# Patient Record
Sex: MALE | Race: BLACK OR AFRICAN AMERICAN | Employment: UNEMPLOYED | URBAN - METROPOLITAN AREA
[De-identification: names, ages, dates, MRNs, and addresses within clinical notes are randomized per-mention and may not be internally consistent; named-entity substitution may affect disease eponyms.]

---

## 2018-11-30 ENCOUNTER — OFFICE VISIT (OUTPATIENT)
Dept: PAIN MANAGEMENT | Age: 53
End: 2018-11-30
Payer: COMMERCIAL

## 2018-11-30 VITALS — WEIGHT: 219 LBS | DIASTOLIC BLOOD PRESSURE: 94 MMHG | SYSTOLIC BLOOD PRESSURE: 154 MMHG | HEART RATE: 78 BPM

## 2018-11-30 DIAGNOSIS — S92.401B OPEN FRACTURE OF PHALANX OF RIGHT GREAT TOE, PHYSEAL INVOLVEMENT UNSPECIFIED, UNSPECIFIED PHALANX, INITIAL ENCOUNTER: ICD-10-CM

## 2018-11-30 DIAGNOSIS — S97.81XA CRUSHING INJURY OF RIGHT FOOT, INITIAL ENCOUNTER: ICD-10-CM

## 2018-11-30 DIAGNOSIS — S90.31XA CONTUSION OF RIGHT FOOT, INITIAL ENCOUNTER: ICD-10-CM

## 2018-11-30 DIAGNOSIS — S92.501B OPEN FRACTURE OF PHALANX OF LESSER TOE OF RIGHT FOOT, PHYSEAL INVOLVEMENT UNSPECIFIED, UNSPECIFIED PHALANX, INITIAL ENCOUNTER: ICD-10-CM

## 2018-11-30 DIAGNOSIS — S93.601A SPRAIN OF RIGHT FOOT, INITIAL ENCOUNTER: ICD-10-CM

## 2018-11-30 PROBLEM — S92.911B: Status: ACTIVE | Noted: 2018-11-30

## 2018-11-30 LAB
A/G RATIO: 1.3 (ref 1.1–2.2)
ALBUMIN SERPL-MCNC: 4.4 G/DL (ref 3.4–5)
ALP BLD-CCNC: 73 U/L (ref 40–129)
ALT SERPL-CCNC: 24 U/L (ref 10–40)
ANION GAP SERPL CALCULATED.3IONS-SCNC: 13 MMOL/L (ref 3–16)
AST SERPL-CCNC: 21 U/L (ref 15–37)
BILIRUB SERPL-MCNC: 0.4 MG/DL (ref 0–1)
BUN BLDV-MCNC: 15 MG/DL (ref 7–20)
CALCIUM SERPL-MCNC: 9.5 MG/DL (ref 8.3–10.6)
CHLORIDE BLD-SCNC: 105 MMOL/L (ref 99–110)
CO2: 28 MMOL/L (ref 21–32)
CREAT SERPL-MCNC: 1 MG/DL (ref 0.9–1.3)
GFR AFRICAN AMERICAN: >60
GFR NON-AFRICAN AMERICAN: >60
GLOBULIN: 3.3 G/DL
GLUCOSE BLD-MCNC: 94 MG/DL (ref 70–99)
HCT VFR BLD CALC: 51.2 % (ref 40.5–52.5)
HEMOGLOBIN: 17 G/DL (ref 13.5–17.5)
MCH RBC QN AUTO: 31.1 PG (ref 26–34)
MCHC RBC AUTO-ENTMCNC: 33.1 G/DL (ref 31–36)
MCV RBC AUTO: 93.9 FL (ref 80–100)
PDW BLD-RTO: 13.7 % (ref 12.4–15.4)
PLATELET # BLD: 222 K/UL (ref 135–450)
PMV BLD AUTO: 8.6 FL (ref 5–10.5)
POTASSIUM SERPL-SCNC: 4.2 MMOL/L (ref 3.5–5.1)
RBC # BLD: 5.45 M/UL (ref 4.2–5.9)
SODIUM BLD-SCNC: 146 MMOL/L (ref 136–145)
TOTAL PROTEIN: 7.7 G/DL (ref 6.4–8.2)
TSH SERPL DL<=0.05 MIU/L-ACNC: 1.03 UIU/ML (ref 0.27–4.2)
WBC # BLD: 8.7 K/UL (ref 4–11)

## 2018-11-30 PROCEDURE — 99244 OFF/OP CNSLTJ NEW/EST MOD 40: CPT | Performed by: INTERNAL MEDICINE

## 2018-11-30 RX ORDER — NORTRIPTYLINE HYDROCHLORIDE 25 MG/1
25-50 CAPSULE ORAL NIGHTLY
Qty: 60 CAPSULE | Refills: 3 | Status: SHIPPED | OUTPATIENT
Start: 2018-11-30 | End: 2018-12-21 | Stop reason: ALTCHOICE

## 2018-11-30 RX ORDER — PREGABALIN 75 MG/1
CAPSULE ORAL
Qty: 60 CAPSULE | Refills: 0 | Status: SHIPPED | OUTPATIENT
Start: 2018-11-30 | End: 2018-12-21 | Stop reason: SDUPTHER

## 2018-11-30 RX ORDER — DULOXETIN HYDROCHLORIDE 30 MG/1
30 CAPSULE, DELAYED RELEASE ORAL DAILY
Qty: 30 CAPSULE | Refills: 1 | Status: SHIPPED | OUTPATIENT
Start: 2018-11-30 | End: 2018-12-21 | Stop reason: ALTCHOICE

## 2018-12-21 ENCOUNTER — OFFICE VISIT (OUTPATIENT)
Dept: PAIN MANAGEMENT | Age: 53
End: 2018-12-21
Payer: COMMERCIAL

## 2018-12-21 VITALS — HEART RATE: 89 BPM | SYSTOLIC BLOOD PRESSURE: 157 MMHG | DIASTOLIC BLOOD PRESSURE: 102 MMHG | WEIGHT: 220 LBS

## 2018-12-21 DIAGNOSIS — S97.81XA CRUSHING INJURY OF RIGHT FOOT, INITIAL ENCOUNTER: ICD-10-CM

## 2018-12-21 DIAGNOSIS — S92.401B OPEN FRACTURE OF PHALANX OF RIGHT GREAT TOE, PHYSEAL INVOLVEMENT UNSPECIFIED, UNSPECIFIED PHALANX, INITIAL ENCOUNTER: ICD-10-CM

## 2018-12-21 DIAGNOSIS — S90.31XA CONTUSION OF RIGHT FOOT, INITIAL ENCOUNTER: ICD-10-CM

## 2018-12-21 DIAGNOSIS — S92.501B OPEN FRACTURE OF PHALANX OF LESSER TOE OF RIGHT FOOT, PHYSEAL INVOLVEMENT UNSPECIFIED, UNSPECIFIED PHALANX, INITIAL ENCOUNTER: ICD-10-CM

## 2018-12-21 DIAGNOSIS — S93.601A SPRAIN OF RIGHT FOOT, INITIAL ENCOUNTER: ICD-10-CM

## 2018-12-21 PROCEDURE — 99214 OFFICE O/P EST MOD 30 MIN: CPT | Performed by: INTERNAL MEDICINE

## 2018-12-21 RX ORDER — AMITRIPTYLINE HYDROCHLORIDE 25 MG/1
25-50 TABLET, FILM COATED ORAL NIGHTLY
Qty: 60 TABLET | Refills: 0 | Status: SHIPPED | OUTPATIENT
Start: 2018-12-21 | End: 2019-01-18

## 2018-12-21 RX ORDER — PREGABALIN 75 MG/1
75 CAPSULE ORAL 3 TIMES DAILY
Qty: 90 CAPSULE | Refills: 0 | Status: SHIPPED | OUTPATIENT
Start: 2018-12-21 | End: 2019-01-18 | Stop reason: SDUPTHER

## 2018-12-21 RX ORDER — DULOXETIN HYDROCHLORIDE 60 MG/1
60 CAPSULE, DELAYED RELEASE ORAL DAILY
Qty: 30 CAPSULE | Refills: 0 | Status: SHIPPED | OUTPATIENT
Start: 2018-12-21 | End: 2019-01-18

## 2018-12-21 NOTE — PROGRESS NOTES
visual disturbance. Respiratory: Negative for shortness of breath. Cardiovascular: Negative for chest pain, palpitations  Gastrointestinal: Negative for blood in stool, abdominal distention, nausea, vomiting, abdominal pain, diarrhea,constipation. Skin: Negative for color change or any abnormal bruising. Neurological: Negative for speech difficulty, weakness and light-headedness, dizziness, tremors, sleepiness  Psychiatric/Behavioral: Negative for suicidal ideas, hallucinations, behavioral problems, self-injury, decreased concentration/cognition, agitation, confusion. PHYSICAL EXAM:   Nursing note and vitals reviewed. BP (!) 157/102   Pulse 89   Wt 220 lb (99.8 kg)   General Appearance: Patient is well nourished, well developed, well groomed and in no acute distress. Skin: Skin is warm and dry, good turgor . No rash or lesions noted. He is not diaphoretic. Pulmonary/Chest: Effort normal. No respiratory distress or use of accessory muscles. Auscultation revealing normal air entry. He does not have wheezes in the lung fields. He has no rales. Cardiovascular: Normal rate, regular rhythm, normal heart sounds, and does not have murmur. Exam reveals no gallop and no friction rub. Abdominal: Soft. Bowel sounds are normal.  On inspection of abdomen is flat no distension and no mass. No tenderness. He has no rebound and no guarding. Musculoskeletal / Extremities: Range of motion is normal. Gait is normal, assistive devices use: none. He exhibits edema: none, and no tenderness. Neurological/Psychiatric:He is alert and oriented to person, place, and time. Coordination is  normal.   Judgement and Insight is normal  His mood is Appropriate and affect is Flat/blunted and Anxious . His behavior is normal.   thought content normal.        IMPRESSION:     1. Open fracture of phalanx of right great toe, physeal involvement unspecified, unspecified phalanx, initial encounter    2.  Open fracture of

## 2018-12-31 NOTE — PROGRESS NOTES
Musculoskeletal:  No clubbing, no edema, no tenderness, no deformities. There are no varicosities  Lymphatic:  No lymphadenopathy noted   Neurologic:  Alert & oriented x 3, CN 2-12 normal, normal motor function, normal sensory function, no focal deficits noted   Psychiatric:  Speech and behavior appropriate, judgement and thought content normal     Patient's old records are reviewed in detail records from primary care physician reviewed records from orthopedics was also reviewed imaging studies reviewed old x-rays from 2013 reviewed showing fractures of the first second and third phalanges MRI of the foot was also reviewed. IMPRESSION    [facility] FRACTURE FAILINGS FOOT RIGHT SIDE BIG-TIME  [facility] FRACTURE FAILINGS FOOT RIGHT SECOND TOE  [facility] FRACTURE FAILINGS FOOT RIGHT SIDE THIRD TOE  [facility] SPURRING OF THE FOOT RIGHT  [facility] CRUSHING INJURY FOOT RIGHT SIDE  [facility] CONTUSION OF THE FOOT RIGHT SIDE    Chronic opiate treatment protocol was discussed with the patient. Informed verbal consent was obtained. Treatment guidelines were established. Risks and benefits of the medications including narcotics were discussed with the patient. SOAPP questionnaire and opioid risk tool were assessed. Long-term and short-term goals of pain management were also addressed including pain relief about 30% from baseline, improving mood, sleep, psychosocial, and physical functioning were addressed. We'll start nortriptyline 25 mg at bedtime 1-2 pills to help her neuropathic pain and also insomnia start Cymbalta 30 mg 1 a day and Lyrica 75 titrate up to 225 mg we'll try topical compounding cream to the foot 3 times a day order CBC CMP UA TSH and follow follow-up on the results. Patient profile OARRS website was reviewed. We'll do urine drug screen with GCMS opiates and follow-up on the results. All treatment options were discussed with the patient. Goals of current treatment regimen include improvement in pain, restoration of functioning- with

## 2019-01-18 ENCOUNTER — OFFICE VISIT (OUTPATIENT)
Dept: PAIN MANAGEMENT | Age: 54
End: 2019-01-18
Payer: COMMERCIAL

## 2019-01-18 VITALS — DIASTOLIC BLOOD PRESSURE: 92 MMHG | WEIGHT: 227 LBS | SYSTOLIC BLOOD PRESSURE: 158 MMHG | HEART RATE: 72 BPM

## 2019-01-18 DIAGNOSIS — S92.401B OPEN FRACTURE OF PHALANX OF RIGHT GREAT TOE, PHYSEAL INVOLVEMENT UNSPECIFIED, UNSPECIFIED PHALANX, INITIAL ENCOUNTER: ICD-10-CM

## 2019-01-18 DIAGNOSIS — S97.81XA CRUSHING INJURY OF RIGHT FOOT, INITIAL ENCOUNTER: ICD-10-CM

## 2019-01-18 DIAGNOSIS — S90.31XA CONTUSION OF RIGHT FOOT, INITIAL ENCOUNTER: ICD-10-CM

## 2019-01-18 DIAGNOSIS — S93.601A SPRAIN OF RIGHT FOOT, INITIAL ENCOUNTER: ICD-10-CM

## 2019-01-18 DIAGNOSIS — S92.501B OPEN FRACTURE OF PHALANX OF LESSER TOE OF RIGHT FOOT, PHYSEAL INVOLVEMENT UNSPECIFIED, UNSPECIFIED PHALANX, INITIAL ENCOUNTER: ICD-10-CM

## 2019-01-18 PROCEDURE — 99214 OFFICE O/P EST MOD 30 MIN: CPT | Performed by: INTERNAL MEDICINE

## 2019-01-18 RX ORDER — PREGABALIN 75 MG/1
75 CAPSULE ORAL 3 TIMES DAILY
Qty: 90 CAPSULE | Refills: 0 | Status: SHIPPED | OUTPATIENT
Start: 2019-01-18 | End: 2019-01-18 | Stop reason: CLARIF

## 2019-01-18 RX ORDER — PREGABALIN 75 MG/1
CAPSULE ORAL
Qty: 90 CAPSULE | Refills: 0 | Status: SHIPPED | OUTPATIENT
Start: 2019-01-18 | End: 2019-02-15 | Stop reason: ALTCHOICE

## 2019-02-15 ENCOUNTER — OFFICE VISIT (OUTPATIENT)
Dept: PAIN MANAGEMENT | Age: 54
End: 2019-02-15
Payer: COMMERCIAL

## 2019-02-15 VITALS — WEIGHT: 227 LBS | SYSTOLIC BLOOD PRESSURE: 147 MMHG | HEART RATE: 62 BPM | DIASTOLIC BLOOD PRESSURE: 85 MMHG

## 2019-02-15 DIAGNOSIS — S93.601A SPRAIN OF RIGHT FOOT, INITIAL ENCOUNTER: ICD-10-CM

## 2019-02-15 DIAGNOSIS — S97.81XA CRUSHING INJURY OF RIGHT FOOT, INITIAL ENCOUNTER: ICD-10-CM

## 2019-02-15 DIAGNOSIS — S92.401B OPEN FRACTURE OF PHALANX OF RIGHT GREAT TOE, PHYSEAL INVOLVEMENT UNSPECIFIED, UNSPECIFIED PHALANX, INITIAL ENCOUNTER: ICD-10-CM

## 2019-02-15 DIAGNOSIS — S92.501B OPEN FRACTURE OF PHALANX OF LESSER TOE OF RIGHT FOOT, PHYSEAL INVOLVEMENT UNSPECIFIED, UNSPECIFIED PHALANX, INITIAL ENCOUNTER: ICD-10-CM

## 2019-02-15 DIAGNOSIS — S90.31XA CONTUSION OF RIGHT FOOT, INITIAL ENCOUNTER: ICD-10-CM

## 2019-02-15 PROCEDURE — 99214 OFFICE O/P EST MOD 30 MIN: CPT | Performed by: INTERNAL MEDICINE

## 2019-02-15 RX ORDER — NORTRIPTYLINE HYDROCHLORIDE 25 MG/1
25-50 CAPSULE ORAL NIGHTLY
Qty: 60 CAPSULE | Refills: 0 | Status: SHIPPED | OUTPATIENT
Start: 2019-02-15 | End: 2019-03-15

## 2019-02-15 RX ORDER — MELOXICAM 15 MG/1
15 TABLET ORAL DAILY
Qty: 30 TABLET | Refills: 0 | Status: SHIPPED | OUTPATIENT
Start: 2019-02-15 | End: 2019-03-15 | Stop reason: SDUPTHER

## 2019-02-15 RX ORDER — PREGABALIN 100 MG/1
CAPSULE ORAL
Qty: 90 CAPSULE | Refills: 0 | Status: SHIPPED | OUTPATIENT
Start: 2019-02-15 | End: 2019-03-15 | Stop reason: SDUPTHER

## 2019-02-19 ENCOUNTER — TELEPHONE (OUTPATIENT)
Dept: PAIN MANAGEMENT | Age: 54
End: 2019-02-19

## 2019-03-15 ENCOUNTER — TELEPHONE (OUTPATIENT)
Dept: ORTHOPEDIC SURGERY | Age: 54
End: 2019-03-15

## 2019-03-15 ENCOUNTER — OFFICE VISIT (OUTPATIENT)
Dept: PAIN MANAGEMENT | Age: 54
End: 2019-03-15
Payer: COMMERCIAL

## 2019-03-15 VITALS — WEIGHT: 223 LBS | HEART RATE: 55 BPM | SYSTOLIC BLOOD PRESSURE: 162 MMHG | DIASTOLIC BLOOD PRESSURE: 88 MMHG

## 2019-03-15 DIAGNOSIS — S97.81XA CRUSHING INJURY OF RIGHT FOOT, INITIAL ENCOUNTER: ICD-10-CM

## 2019-03-15 DIAGNOSIS — S90.31XA CONTUSION OF RIGHT FOOT, INITIAL ENCOUNTER: ICD-10-CM

## 2019-03-15 DIAGNOSIS — S92.401B OPEN FRACTURE OF PHALANX OF RIGHT GREAT TOE, PHYSEAL INVOLVEMENT UNSPECIFIED, UNSPECIFIED PHALANX, INITIAL ENCOUNTER: ICD-10-CM

## 2019-03-15 DIAGNOSIS — S92.501B OPEN FRACTURE OF PHALANX OF LESSER TOE OF RIGHT FOOT, PHYSEAL INVOLVEMENT UNSPECIFIED, UNSPECIFIED PHALANX, INITIAL ENCOUNTER: ICD-10-CM

## 2019-03-15 DIAGNOSIS — S93.601A SPRAIN OF RIGHT FOOT, INITIAL ENCOUNTER: ICD-10-CM

## 2019-03-15 PROCEDURE — 99214 OFFICE O/P EST MOD 30 MIN: CPT | Performed by: INTERNAL MEDICINE

## 2019-03-15 RX ORDER — AMITRIPTYLINE HYDROCHLORIDE 25 MG/1
25-50 TABLET, FILM COATED ORAL NIGHTLY
Qty: 60 TABLET | Refills: 0 | Status: SHIPPED | OUTPATIENT
Start: 2019-03-15 | End: 2019-04-12 | Stop reason: ALTCHOICE

## 2019-03-15 RX ORDER — HYDROCODONE BITARTRATE AND ACETAMINOPHEN 5; 325 MG/1; MG/1
1 TABLET ORAL EVERY 6 HOURS PRN
Qty: 56 TABLET | Refills: 0 | Status: SHIPPED | OUTPATIENT
Start: 2019-03-15 | End: 2019-04-12 | Stop reason: ALTCHOICE

## 2019-03-15 RX ORDER — PREGABALIN 100 MG/1
CAPSULE ORAL
Qty: 90 CAPSULE | Refills: 0 | Status: SHIPPED | OUTPATIENT
Start: 2019-03-15 | End: 2019-04-12 | Stop reason: SDUPTHER

## 2019-03-15 RX ORDER — MELOXICAM 15 MG/1
15 TABLET ORAL DAILY
Qty: 30 TABLET | Refills: 0 | Status: SHIPPED | OUTPATIENT
Start: 2019-03-15 | End: 2019-04-12 | Stop reason: SDUPTHER

## 2019-04-11 DIAGNOSIS — S97.81XA CRUSHING INJURY OF RIGHT FOOT, INITIAL ENCOUNTER: ICD-10-CM

## 2019-04-11 DIAGNOSIS — S90.31XA CONTUSION OF RIGHT FOOT, INITIAL ENCOUNTER: ICD-10-CM

## 2019-04-11 DIAGNOSIS — S93.601A SPRAIN OF RIGHT FOOT, INITIAL ENCOUNTER: ICD-10-CM

## 2019-04-11 DIAGNOSIS — S92.401B OPEN FRACTURE OF PHALANX OF RIGHT GREAT TOE, PHYSEAL INVOLVEMENT UNSPECIFIED, UNSPECIFIED PHALANX, INITIAL ENCOUNTER: ICD-10-CM

## 2019-04-11 DIAGNOSIS — S92.501B OPEN FRACTURE OF PHALANX OF LESSER TOE OF RIGHT FOOT, PHYSEAL INVOLVEMENT UNSPECIFIED, UNSPECIFIED PHALANX, INITIAL ENCOUNTER: ICD-10-CM

## 2019-04-11 RX ORDER — AMITRIPTYLINE HYDROCHLORIDE 25 MG/1
25-50 TABLET, FILM COATED ORAL NIGHTLY
Qty: 60 TABLET | Refills: 0 | OUTPATIENT
Start: 2019-04-11

## 2019-04-11 RX ORDER — MELOXICAM 15 MG/1
TABLET ORAL
Qty: 30 TABLET | Refills: 0 | OUTPATIENT
Start: 2019-04-11

## 2019-04-12 ENCOUNTER — OFFICE VISIT (OUTPATIENT)
Dept: PAIN MANAGEMENT | Age: 54
End: 2019-04-12
Payer: COMMERCIAL

## 2019-04-12 ENCOUNTER — OFFICE VISIT (OUTPATIENT)
Dept: ORTHOPEDIC SURGERY | Age: 54
End: 2019-04-12
Payer: COMMERCIAL

## 2019-04-12 VITALS
BODY MASS INDEX: 32.93 KG/M2 | WEIGHT: 223 LBS | HEART RATE: 64 BPM | DIASTOLIC BLOOD PRESSURE: 93 MMHG | SYSTOLIC BLOOD PRESSURE: 148 MMHG

## 2019-04-12 VITALS
RESPIRATION RATE: 16 BRPM | DIASTOLIC BLOOD PRESSURE: 87 MMHG | BODY MASS INDEX: 33.03 KG/M2 | WEIGHT: 223 LBS | HEART RATE: 51 BPM | HEIGHT: 69 IN | SYSTOLIC BLOOD PRESSURE: 170 MMHG

## 2019-04-12 DIAGNOSIS — S93.601A SPRAIN OF RIGHT FOOT, INITIAL ENCOUNTER: ICD-10-CM

## 2019-04-12 DIAGNOSIS — S92.401B OPEN FRACTURE OF PHALANX OF RIGHT GREAT TOE, PHYSEAL INVOLVEMENT UNSPECIFIED, UNSPECIFIED PHALANX, INITIAL ENCOUNTER: ICD-10-CM

## 2019-04-12 DIAGNOSIS — S92.501B OPEN FRACTURE OF PHALANX OF LESSER TOE OF RIGHT FOOT, PHYSEAL INVOLVEMENT UNSPECIFIED, UNSPECIFIED PHALANX, INITIAL ENCOUNTER: ICD-10-CM

## 2019-04-12 DIAGNOSIS — S90.31XS CONTUSION OF RIGHT FOOT, SEQUELA: Primary | ICD-10-CM

## 2019-04-12 DIAGNOSIS — M79.671 FOOT PAIN, RIGHT: ICD-10-CM

## 2019-04-12 DIAGNOSIS — S90.31XA CONTUSION OF RIGHT FOOT, INITIAL ENCOUNTER: ICD-10-CM

## 2019-04-12 DIAGNOSIS — S97.81XA CRUSHING INJURY OF RIGHT FOOT, INITIAL ENCOUNTER: ICD-10-CM

## 2019-04-12 PROCEDURE — 99203 OFFICE O/P NEW LOW 30 MIN: CPT | Performed by: ORTHOPAEDIC SURGERY

## 2019-04-12 PROCEDURE — 99214 OFFICE O/P EST MOD 30 MIN: CPT | Performed by: INTERNAL MEDICINE

## 2019-04-12 RX ORDER — NORTRIPTYLINE HYDROCHLORIDE 25 MG/1
25-50 CAPSULE ORAL NIGHTLY
Qty: 60 CAPSULE | Refills: 0 | Status: SHIPPED | OUTPATIENT
Start: 2019-04-12 | End: 2019-05-10 | Stop reason: SDUPTHER

## 2019-04-12 RX ORDER — PREGABALIN 100 MG/1
CAPSULE ORAL
Qty: 90 CAPSULE | Refills: 0 | Status: SHIPPED | OUTPATIENT
Start: 2019-04-12 | End: 2019-05-10 | Stop reason: SDUPTHER

## 2019-04-12 RX ORDER — MELOXICAM 15 MG/1
15 TABLET ORAL DAILY
Qty: 30 TABLET | Refills: 0 | Status: SHIPPED | OUTPATIENT
Start: 2019-04-12 | End: 2019-05-10 | Stop reason: SDUPTHER

## 2019-04-12 RX ORDER — OXYCODONE HYDROCHLORIDE AND ACETAMINOPHEN 5; 325 MG/1; MG/1
1 TABLET ORAL DAILY
Qty: 28 TABLET | Refills: 0 | Status: SHIPPED | OUTPATIENT
Start: 2019-04-12 | End: 2019-05-10 | Stop reason: SDUPTHER

## 2019-04-12 NOTE — PROGRESS NOTES
restorative. Does not feel rested in AM. Complains of feeling sleepy  during the day, he is off the Elavil. Patient denies any constipation symptoms. Patient reports his weight has been stable. ALLERGIES/PAST MED/FAM/SOC HISTORY: Mr. Aston Stark allergies, past medical, family and social history were reviewed in the chart and also listed below.   Social History     Socioeconomic History    Marital status: Unknown     Spouse name: Not on file    Number of children: Not on file    Years of education: Not on file    Highest education level: Not on file   Occupational History    Not on file   Social Needs    Financial resource strain: Not on file    Food insecurity:     Worry: Not on file     Inability: Not on file    Transportation needs:     Medical: Not on file     Non-medical: Not on file   Tobacco Use    Smoking status: Never Smoker    Smokeless tobacco: Never Used   Substance and Sexual Activity    Alcohol use: Not on file    Drug use: Not on file    Sexual activity: Not on file   Lifestyle    Physical activity:     Days per week: Not on file     Minutes per session: Not on file    Stress: Not on file   Relationships    Social connections:     Talks on phone: Not on file     Gets together: Not on file     Attends Muslim service: Not on file     Active member of club or organization: Not on file     Attends meetings of clubs or organizations: Not on file     Relationship status: Not on file    Intimate partner violence:     Fear of current or ex partner: Not on file     Emotionally abused: Not on file     Physically abused: Not on file     Forced sexual activity: Not on file   Other Topics Concern    Not on file   Social History Narrative    Not on file       Mr. Aston Stark current medications are   Outpatient Medications Prior to Visit   Medication Sig Dispense Refill    folic acid-pyridoxine-cyanocobalamine (FOLBEE) 2.5-25-1 MG TABS tablet Take 1 tablet by mouth daily 30 tablet 0    meloxicam (MOBIC) 15 MG tablet Take 1 tablet by mouth daily 30 tablet 0    pregabalin (LYRICA) 100 MG capsule Take one capsule in the morning and two capsules at night 90 capsule 0    HYDROcodone-acetaminophen (NORCO) 5-325 MG per tablet Take 1 tablet by mouth every 6 hours as needed for Pain (Max 1-2 per day) for up to 28 days. 56 tablet 0    Buprenorphine HCl (BELBUCA) 75 MCG FILM Place 1 Film inside cheek 2 times daily for 30 days. 60 each 0    amitriptyline (ELAVIL) 25 MG tablet Take 1-2 tablets by mouth nightly 60 tablet 0    Diclofenac Sodium POWD 2 g by Does not apply route 3 times daily #3 D Baclo 2%, Diclo 3%, Abeba 6%, Lido 2%, Prilo 2% Pharm to comp 240 g 5     No facility-administered medications prior to visit. REVIEW OF SYSTEMS:   Constitutional: Negative for fatigue and unexpected weight change. Eyes: Negative for visual disturbance. Respiratory: Negative for shortness of breath. Cardiovascular: Negative for chest pain, palpitations  Gastrointestinal: Negative for blood in stool, abdominal distention, nausea, vomiting, abdominal pain, diarrhea,constipation. Skin: Negative for color change or any abnormal bruising. Neurological: Negative for speech difficulty, weakness and light-headedness, dizziness, tremors, sleepiness  Psychiatric/Behavioral: Negative for suicidal ideas, hallucinations, behavioral problems, self-injury, decreased concentration/cognition, agitation, confusion. PHYSICAL EXAM:   Nursing note and vitals reviewed. BP (!) 148/93   Pulse 64   Wt 223 lb (101.2 kg)   BMI 32.93 kg/m²   General Appearance: Patient is well nourished, well developed, well groomed and in no acute distress. Skin: Skin is warm and dry, good turgor . No rash or lesions noted. He is not diaphoretic. Pulmonary/Chest: Effort normal. No respiratory distress or use of accessory muscles. Auscultation revealing normal air entry. He does not have wheezes in the lung fields. He has no rales. Cardiovascular: Normal rate, regular rhythm, normal heart sounds, and does not have murmur. Exam reveals no gallop and no friction rub. Abdominal: Soft. Bowel sounds are normal.  On inspection of abdomen is flat no distension and no mass. No tenderness. He has no rebound and no guarding. Musculoskeletal / Extremities: Range of motion is normal. Gait is normal, assistive devices use: none. He exhibits edema: none, and no tenderness. Neurological/Psychiatric:He is alert and oriented to person, place, and time. Coordination is  normal.   Judgement and Insight is normal  His mood is Appropriate and affect is Flat/blunted and Anxious . His behavior is normal.   thought content normal.        IMPRESSION:     1. Open fracture of phalanx of right great toe, physeal involvement unspecified, unspecified phalanx, initial encounter    2. Open fracture of phalanx of lesser toe of right foot, physeal involvement unspecified, unspecified phalanx, initial encounter    3. Sprain of right foot, initial encounter    4. Crushing injury of right foot, initial encounter    5.  Contusion of right foot, initial encounter        PLAN:  Informed verbal consent was obtained.  -continue with current opioid regimen  -d/c Belbuca, and Aleks Sinha says it is not helping and start Nucynta ER 50 BID along with Percocet 5 mg 1 in the afternoon  -records from Ortho not available  -He was advised weight reduction, diet changes- 800-1200 jairo diet, diet diary, exercising, nutritional  consult increased physical activity as tolerated   -He was advised to increase fluids ( 5-7  glasses of fluid daily), limit caffeine, avoid cheese products, increase dietary fiber, increase activity and exercise as tolerated and relax regularly and enjoy meals   -d/c Elavil and start Pamelor 25 mg 1-2 night for insomnia   -he was advised proper sleep hygiene-told to avoid:use of caffeine or other stimulants after noon, alcohol use near bedtime, long or frequent naps during the day, erratic sleep schedule, heavy meals near bedtime, vigorous exercise near bedtime and use of electronic devices near bedtime   -[facility] has not approved the Topical compounding cream  -will order compression stockings on the right foot    Mr. Terri Renee will be prescribed  the medications  listed below which are for treatment of his presenting  medical problems which for this visit include:   Diagnoses of Open fracture of phalanx of right great toe, physeal involvement unspecified, unspecified phalanx, initial encounter, Open fracture of phalanx of lesser toe of right foot, physeal involvement unspecified, unspecified phalanx, initial encounter, Sprain of right foot, initial encounter, Crushing injury of right foot, initial encounter, and Contusion of right foot, initial encounter were pertinent to this visit. Medications/orders associated with this visit:    Current Outpatient Medications   Medication Sig Dispense Refill    folic acid-pyridoxine-cyanocobalamine (FOLBEE) 2.5-25-1 MG TABS tablet Take 1 tablet by mouth daily 30 tablet 0    meloxicam (MOBIC) 15 MG tablet Take 1 tablet by mouth daily 30 tablet 0    pregabalin (LYRICA) 100 MG capsule Take one capsule in the morning and two capsules at night 90 capsule 0    HYDROcodone-acetaminophen (NORCO) 5-325 MG per tablet Take 1 tablet by mouth every 6 hours as needed for Pain (Max 1-2 per day) for up to 28 days. 56 tablet 0    Buprenorphine HCl (BELBUCA) 75 MCG FILM Place 1 Film inside cheek 2 times daily for 30 days. 60 each 0    amitriptyline (ELAVIL) 25 MG tablet Take 1-2 tablets by mouth nightly 60 tablet 0    Diclofenac Sodium POWD 2 g by Does not apply route 3 times daily #3 D Baclo 2%, Diclo 3%, Abeba 6%, Lido 2%, Prilo 2% Pharm to comp 240 g 5     No current facility-administered medications for this visit.          Goals of current treatment regimen include improvement in pain, restoration of functioning- with focus on improvement in physical performance, general activity, work or disability,emotional distress, health care utilization and  decreased medication consumption. Will continue to monitor progress towards achieving/maintaining therapeutic goals with special emphasis on  1. Improvement in perceived interfernce  of pain with ADL's. Ability to do home exercises independently. Ability to do household chores indoor and/or outdoor work and social and leisure activities. To increase flexibility/ROM, strength and endurance. Improve psychosocial and physical functioning.- he is not showing any significant progress/or showing regression  towards this goal and reassessment and adjustment of goals/treatment have been made. 2. Improving sleep to 6-7 hours a night. Improve mood/ anxiety and depression symptoms such as crying spells, low energy, problems with concentration, motivation.- he is not showing any significant progress/or showing regression  towards this goal and reassessment and adjustment of goals/treatment have been made. 3. Reduction of reliance on opioid analgesia/more appropriate opioid use. - he is showing progression towards this treatment goal with the current regimen. Risks and benefits of the medications and other alternative treatments have been/were  discussed with the patient. Any questions on the  common side effects of these medications were also answered. He was advised against drinking alcohol with the narcotic pain medicines, advised against driving or handling machinery when  starting or adjusting the dose of medicines, feeling groggy or drowsy, or if having any cognitive issues related to the current medications. Heis fully aware of the risk of overdose and death, if medicines are misused and not taken as prescribed. If he develops new symptoms or if the symptoms worsen, he was told to call the office. .  Thank you for allowing me to participate in the care of this patient.     Dario Silver MD.    Cc: Samson Delcid primary care provider on file. Marge Ayala, scribing for in the presence  of Dr. Ezekiel White.   04/12/19  3:16 PM  Jean Carlos Hallman Assistant    I, Dr. Ezekiel White, personally performed the services described in this documentation as scribed by  Patrica Calvin MA in my presence and it is both accurate and complete

## 2019-04-21 NOTE — PROGRESS NOTES
CHIEF COMPLAINT:   1- Right foot pain/ foot contusion  2- Right foot chronic pain/ possible RSD. DATE OF INJURY:  11/2013, Worker's Comp    HISTORY:  Mr. Ivy Dooley 52 y.o.   male  presents today for the first visit for a 2nd opinion and evaluation of a right foot injury which occurred when he was at work and a fork lift ran over his foot. He is complaining of foot pain 9-10/10 and sensitivity to touch. This is better with elevation and worse with bearing wt. The pain is sharp and radiating with numbness and tingling sensation. No other complaint. He was seen by Dr Erinn Jimenez, and now in pain management with Dr Lorna Grant. History reviewed. No pertinent past medical history. History reviewed. No pertinent surgical history.     Social History     Socioeconomic History    Marital status: Unknown     Spouse name: Not on file    Number of children: Not on file    Years of education: Not on file    Highest education level: Not on file   Occupational History    Not on file   Social Needs    Financial resource strain: Not on file    Food insecurity:     Worry: Not on file     Inability: Not on file    Transportation needs:     Medical: Not on file     Non-medical: Not on file   Tobacco Use    Smoking status: Never Smoker    Smokeless tobacco: Never Used   Substance and Sexual Activity    Alcohol use: Not on file    Drug use: Not on file    Sexual activity: Not on file   Lifestyle    Physical activity:     Days per week: Not on file     Minutes per session: Not on file    Stress: Not on file   Relationships    Social connections:     Talks on phone: Not on file     Gets together: Not on file     Attends Mosque service: Not on file     Active member of club or organization: Not on file     Attends meetings of clubs or organizations: Not on file     Relationship status: Not on file    Intimate partner violence:     Fear of current or ex partner: Not on file     Emotionally abused: Not on file     Physically abused: Not on file     Forced sexual activity: Not on file   Other Topics Concern    Not on file   Social History Narrative    Not on file       History reviewed. No pertinent family history. Current Outpatient Medications on File Prior to Visit   Medication Sig Dispense Refill    folic acid-pyridoxine-cyanocobalamine (FOLBEE) 2.5-25-1 MG TABS tablet Take 1 tablet by mouth daily 30 tablet 0    meloxicam (MOBIC) 15 MG tablet Take 1 tablet by mouth daily 30 tablet 0    pregabalin (LYRICA) 100 MG capsule Take one capsule in the morning and two capsules at night 90 capsule 0    tapentadol (NUCYNTA ER) 50 MG TB12 extended release tablet Take 1 tablet by mouth every 12 hours for 28 days. 56 tablet 0    nortriptyline (PAMELOR) 25 MG capsule Take 1-2 capsules by mouth nightly 60 capsule 0    oxyCODONE-acetaminophen (PERCOCET) 5-325 MG per tablet Take 1 tablet by mouth daily for 28 days. Take in the afternoon 28 tablet 0    Diclofenac Sodium POWD 2 g by Does not apply route 3 times daily #3 D Baclo 2%, Diclo 3%, Abeba 6%, Lido 2%, Prilo 2% Pharm to comp 240 g 5     No current facility-administered medications on file prior to visit. Pertinent items are noted in HPI  Review of systems reviewed from Patient History Form dated on 4/12/2019 and available in the patient's chart under the Media tab. No change noted. PHYSICAL EXAMINATION:  Mr. Manuel Calvert is a very pleasant 52 y.o.  male who presents today in no acute distress, awake, alert, and oriented. He is well dressed, nourished and  groomed. Patient with normal affect. Height is  5' 9\" (1.753 m), weight is 223 lb (101.2 kg), Body mass index is 32.93 kg/m². Resting respiratory rate is 16. Examination of the gait, showed that the patient walks with a limp, WB right leg . Examination of both ankles showing a decreased range of motion of the right ankle compare to the other side because of foot pain.   There is

## 2019-05-10 ENCOUNTER — OFFICE VISIT (OUTPATIENT)
Dept: PAIN MANAGEMENT | Age: 54
End: 2019-05-10
Payer: COMMERCIAL

## 2019-05-10 VITALS
DIASTOLIC BLOOD PRESSURE: 83 MMHG | SYSTOLIC BLOOD PRESSURE: 155 MMHG | WEIGHT: 223 LBS | BODY MASS INDEX: 33.03 KG/M2 | HEART RATE: 64 BPM | HEIGHT: 69 IN

## 2019-05-10 DIAGNOSIS — S97.81XA CRUSHING INJURY OF RIGHT FOOT, INITIAL ENCOUNTER: ICD-10-CM

## 2019-05-10 DIAGNOSIS — S93.601A SPRAIN OF RIGHT FOOT, INITIAL ENCOUNTER: ICD-10-CM

## 2019-05-10 DIAGNOSIS — S92.401B OPEN FRACTURE OF PHALANX OF RIGHT GREAT TOE, PHYSEAL INVOLVEMENT UNSPECIFIED, UNSPECIFIED PHALANX, INITIAL ENCOUNTER: ICD-10-CM

## 2019-05-10 DIAGNOSIS — S92.501B OPEN FRACTURE OF PHALANX OF LESSER TOE OF RIGHT FOOT, PHYSEAL INVOLVEMENT UNSPECIFIED, UNSPECIFIED PHALANX, INITIAL ENCOUNTER: ICD-10-CM

## 2019-05-10 DIAGNOSIS — S90.31XA CONTUSION OF RIGHT FOOT, INITIAL ENCOUNTER: ICD-10-CM

## 2019-05-10 PROCEDURE — 99214 OFFICE O/P EST MOD 30 MIN: CPT | Performed by: INTERNAL MEDICINE

## 2019-05-10 RX ORDER — OXYCODONE HYDROCHLORIDE AND ACETAMINOPHEN 5; 325 MG/1; MG/1
1 TABLET ORAL DAILY
Qty: 84 TABLET | Refills: 0 | Status: SHIPPED | OUTPATIENT
Start: 2019-05-10 | End: 2019-06-14 | Stop reason: SDUPTHER

## 2019-05-10 RX ORDER — PREGABALIN 100 MG/1
CAPSULE ORAL
Qty: 90 CAPSULE | Refills: 0 | Status: SHIPPED | OUTPATIENT
Start: 2019-05-10 | End: 2019-06-14 | Stop reason: SDUPTHER

## 2019-05-10 RX ORDER — NORTRIPTYLINE HYDROCHLORIDE 25 MG/1
25-50 CAPSULE ORAL NIGHTLY
Qty: 60 CAPSULE | Refills: 0 | Status: SHIPPED | OUTPATIENT
Start: 2019-05-10 | End: 2019-06-14 | Stop reason: SDUPTHER

## 2019-05-10 RX ORDER — MELOXICAM 15 MG/1
15 TABLET ORAL DAILY
Qty: 30 TABLET | Refills: 0 | Status: SHIPPED | OUTPATIENT
Start: 2019-05-10 | End: 2019-05-17 | Stop reason: SDUPTHER

## 2019-05-13 NOTE — PROGRESS NOTES
of sleep apnea. Feels somewhat rested in the morning. Patient's  subjective report of his mood is fair. he describes occasional symptoms of depression, occasional  irritability and some mood swings. Describes his mood as being neutral and reports some pleasure in his daily activities. Reports  fair  appetite, energy and concentration. Able to function well in different aspects of his daily activities. Denies suicidal or homicidal ideation. Denies any complaints of increased tension, does   Worry sometimes and occasional  irritability  he denies any c/o increased anxiety, No c/o panic attacks or symptoms of PTSD. He states his weight has been stable. Lexi Underwood is seen with his wife who wants to give most of the history. ALLERGIES/PAST MED/FAM/SOC HISTORY: Mr. Roman Pallas allergies, past medical, family and social history were reviewed in the chart and also listed below.   Social History     Socioeconomic History    Marital status: Unknown     Spouse name: Not on file    Number of children: Not on file    Years of education: Not on file    Highest education level: Not on file   Occupational History    Not on file   Social Needs    Financial resource strain: Not on file    Food insecurity:     Worry: Not on file     Inability: Not on file    Transportation needs:     Medical: Not on file     Non-medical: Not on file   Tobacco Use    Smoking status: Never Smoker    Smokeless tobacco: Never Used   Substance and Sexual Activity    Alcohol use: Not on file    Drug use: Not on file    Sexual activity: Not on file   Lifestyle    Physical activity:     Days per week: Not on file     Minutes per session: Not on file    Stress: Not on file   Relationships    Social connections:     Talks on phone: Not on file     Gets together: Not on file     Attends Restoration service: Not on file     Active member of club or organization: Not on file     Attends meetings of clubs or organizations: Not on file Relationship status: Not on file    Intimate partner violence:     Fear of current or ex partner: Not on file     Emotionally abused: Not on file     Physically abused: Not on file     Forced sexual activity: Not on file   Other Topics Concern    Not on file   Social History Narrative    Not on file       Mr. Roseann Johnson current medications are   Outpatient Medications Prior to Visit   Medication Sig Dispense Refill    folic acid-pyridoxine-cyanocobalamine (FOLBEE) 2.5-25-1 MG TABS tablet Take 1 tablet by mouth daily 30 tablet 0    meloxicam (MOBIC) 15 MG tablet Take 1 tablet by mouth daily 30 tablet 0    pregabalin (LYRICA) 100 MG capsule Take one capsule in the morning and two capsules at night 90 capsule 0    tapentadol (NUCYNTA ER) 50 MG TB12 extended release tablet Take 1 tablet by mouth every 12 hours for 28 days. 56 tablet 0    nortriptyline (PAMELOR) 25 MG capsule Take 1-2 capsules by mouth nightly 60 capsule 0    oxyCODONE-acetaminophen (PERCOCET) 5-325 MG per tablet Take 1 tablet by mouth daily for 28 days. Take in the afternoon 28 tablet 0    Diclofenac Sodium POWD 2 g by Does not apply route 3 times daily #3 D Baclo 2%, Diclo 3%, Abeba 6%, Lido 2%, Prilo 2% Pharm to comp 240 g 5     No facility-administered medications prior to visit. REVIEW OF SYSTEMS:   Constitutional: Negative for fatigue and unexpected weight change. Eyes: Negative for visual disturbance. Respiratory: Negative for shortness of breath. Cardiovascular: Negative for chest pain, palpitations  Gastrointestinal: Negative for blood in stool, abdominal distention, nausea, vomiting, abdominal pain, diarrhea,constipation. Skin: Negative for color change or any abnormal bruising.    Neurological: Negative for speech difficulty, weakness and light-headedness, dizziness, tremors, sleepiness  Psychiatric/Behavioral: Negative for suicidal ideas, hallucinations, behavioral problems, self-injury, decreased concentration/cognition, agitation, confusion. PHYSICAL EXAM:   Nursing note and vitals reviewed. BP (!) 155/83   Pulse 64   Ht 5' 9\" (1.753 m)   Wt 223 lb (101.2 kg)   BMI 32.93 kg/m²   General Appearance: Patient is well nourished, well developed, well groomed and in no acute distress. Skin: Skin is warm and dry, good turgor . No rash or lesions noted. He is not diaphoretic. Pulmonary/Chest: Effort normal. No respiratory distress or use of accessory muscles. Auscultation revealing normal air entry. He does not have wheezes in the lung fields. He has no rales. Cardiovascular: Normal rate, regular rhythm, normal heart sounds, and does not have murmur. Exam reveals no gallop and no friction rub. Abdominal: Soft. Bowel sounds are normal.  On inspection of abdomen is obese no distension and no mass. No tenderness. He has no rebound and no guarding. Musculoskeletal / Extremities: Range of motion is normal. Gait is normal, assistive devices use: none. He exhibits edema: none, and no tenderness. Neurological/Psychiatric:He is alert and oriented to person, place, and time. Coordination is  normal.   Judgement and Insight is normal  His mood is Appropriate and affect is Flat/blunted and Anxious . His behavior is normal.   thought content normal.        IMPRESSION:     1. Open fracture of phalanx of right great toe, physeal involvement unspecified, unspecified phalanx, initial encounter    2. Open fracture of phalanx of lesser toe of right foot, physeal involvement unspecified, unspecified phalanx, initial encounter    3. Sprain of right foot, initial encounter    4. Crushing injury of right foot, initial encounter    5.  Contusion of right foot, initial encounter        PLAN:  Informed verbal consent was obtained.  -Will Discontinue Nucynta ER and start Percocet 3 per day   -Continue with Lyrica 3 per day   -Continue with Mobic   -Urine drug screen with GC/MS for opiates and drugs of abuse was ordered and will follow up on results.   -Cant tolerate Cymbalta   -[facility] denies the topical compounding  -Patient states his sleep is fair. Has fairly normal sleep latency. Averages about 4-6 hours of sleep a night. Denies any signs of sleep apnea. Feels somewhat rested in the morning.    -Records from ortho reviewed   Mr. Roman Pallas will be prescribed  the medications  listed below which are for treatment of his presenting  medical problems which for this visit include:   Diagnoses of Open fracture of phalanx of right great toe, physeal involvement unspecified, unspecified phalanx, initial encounter, Open fracture of phalanx of lesser toe of right foot, physeal involvement unspecified, unspecified phalanx, initial encounter, Sprain of right foot, initial encounter, Crushing injury of right foot, initial encounter, and Contusion of right foot, initial encounter were pertinent to this visit. Medications/orders associated with this visit:    Current Outpatient Medications   Medication Sig Dispense Refill    folic acid-pyridoxine-cyanocobalamine (FOLBEE) 2.5-25-1 MG TABS tablet Take 1 tablet by mouth daily 30 tablet 0    meloxicam (MOBIC) 15 MG tablet Take 1 tablet by mouth daily 30 tablet 0    pregabalin (LYRICA) 100 MG capsule Take one capsule in the morning and two capsules at night 90 capsule 0    nortriptyline (PAMELOR) 25 MG capsule Take 1-2 capsules by mouth nightly 60 capsule 0    oxyCODONE-acetaminophen (PERCOCET) 5-325 MG per tablet Take 1 tablet by mouth daily for 28 days. Max 3/day 84 tablet 0    Diclofenac Sodium POWD 2 g by Does not apply route 3 times daily #3 D Baclo 2%, Diclo 3%, Abeba 6%, Lido 2%, Prilo 2% Pharm to comp 240 g 5     No current facility-administered medications for this visit.          Goals of current treatment regimen include improvement in pain, restoration of functioning- with focus on improvement in physical performance, general activity, work or disability,emotional distress, health care utilization and

## 2019-05-17 DIAGNOSIS — S90.31XA CONTUSION OF RIGHT FOOT, INITIAL ENCOUNTER: ICD-10-CM

## 2019-05-17 DIAGNOSIS — S93.601A SPRAIN OF RIGHT FOOT, INITIAL ENCOUNTER: ICD-10-CM

## 2019-05-17 DIAGNOSIS — S97.81XA CRUSHING INJURY OF RIGHT FOOT, INITIAL ENCOUNTER: ICD-10-CM

## 2019-05-17 DIAGNOSIS — S92.401B OPEN FRACTURE OF PHALANX OF RIGHT GREAT TOE, PHYSEAL INVOLVEMENT UNSPECIFIED, UNSPECIFIED PHALANX, INITIAL ENCOUNTER: ICD-10-CM

## 2019-05-17 DIAGNOSIS — S92.501B OPEN FRACTURE OF PHALANX OF LESSER TOE OF RIGHT FOOT, PHYSEAL INVOLVEMENT UNSPECIFIED, UNSPECIFIED PHALANX, INITIAL ENCOUNTER: ICD-10-CM

## 2019-05-17 RX ORDER — MELOXICAM 15 MG/1
15 TABLET ORAL DAILY
Qty: 30 TABLET | Refills: 1 | Status: SHIPPED | OUTPATIENT
Start: 2019-05-17 | End: 2019-06-14 | Stop reason: SDUPTHER

## 2019-05-20 RX ORDER — MELOXICAM 15 MG/1
TABLET ORAL
Qty: 30 TABLET | Refills: 0 | OUTPATIENT
Start: 2019-05-20

## 2019-06-14 ENCOUNTER — TELEPHONE (OUTPATIENT)
Dept: PAIN MANAGEMENT | Age: 54
End: 2019-06-14

## 2019-06-14 ENCOUNTER — OFFICE VISIT (OUTPATIENT)
Dept: PAIN MANAGEMENT | Age: 54
End: 2019-06-14
Payer: COMMERCIAL

## 2019-06-14 VITALS
HEART RATE: 63 BPM | WEIGHT: 220 LBS | SYSTOLIC BLOOD PRESSURE: 152 MMHG | DIASTOLIC BLOOD PRESSURE: 87 MMHG | BODY MASS INDEX: 32.49 KG/M2

## 2019-06-14 DIAGNOSIS — S92.401B OPEN FRACTURE OF PHALANX OF RIGHT GREAT TOE, PHYSEAL INVOLVEMENT UNSPECIFIED, UNSPECIFIED PHALANX, INITIAL ENCOUNTER: ICD-10-CM

## 2019-06-14 DIAGNOSIS — S97.81XA CRUSHING INJURY OF RIGHT FOOT, INITIAL ENCOUNTER: ICD-10-CM

## 2019-06-14 DIAGNOSIS — S90.31XA CONTUSION OF RIGHT FOOT, INITIAL ENCOUNTER: ICD-10-CM

## 2019-06-14 DIAGNOSIS — S93.601A SPRAIN OF RIGHT FOOT, INITIAL ENCOUNTER: ICD-10-CM

## 2019-06-14 DIAGNOSIS — S92.501B OPEN FRACTURE OF PHALANX OF LESSER TOE OF RIGHT FOOT, PHYSEAL INVOLVEMENT UNSPECIFIED, UNSPECIFIED PHALANX, INITIAL ENCOUNTER: ICD-10-CM

## 2019-06-14 PROCEDURE — 99213 OFFICE O/P EST LOW 20 MIN: CPT | Performed by: INTERNAL MEDICINE

## 2019-06-14 RX ORDER — NORTRIPTYLINE HYDROCHLORIDE 25 MG/1
25-50 CAPSULE ORAL NIGHTLY
Qty: 60 CAPSULE | Refills: 0 | Status: SHIPPED | OUTPATIENT
Start: 2019-06-14 | End: 2019-08-15

## 2019-06-14 RX ORDER — OXYCODONE HYDROCHLORIDE AND ACETAMINOPHEN 5; 325 MG/1; MG/1
1 TABLET ORAL EVERY 6 HOURS PRN
Qty: 30 TABLET | Refills: 0 | Status: SHIPPED | OUTPATIENT
Start: 2019-06-14 | End: 2019-07-19 | Stop reason: SDUPTHER

## 2019-06-14 RX ORDER — PREGABALIN 100 MG/1
CAPSULE ORAL
Qty: 90 CAPSULE | Refills: 0 | Status: SHIPPED | OUTPATIENT
Start: 2019-06-14 | End: 2019-07-19 | Stop reason: SDUPTHER

## 2019-06-14 RX ORDER — MELOXICAM 15 MG/1
15 TABLET ORAL DAILY
Qty: 30 TABLET | Refills: 1 | Status: SHIPPED | OUTPATIENT
Start: 2019-06-14 | End: 2019-07-19 | Stop reason: SDUPTHER

## 2019-06-14 NOTE — PROGRESS NOTES
Jorge Luis Brownconrad  1965  [id number]    HISTORY OF PRESENT ILLNESS:  Mr. Fiona Yañez is a 62 y.o. male returns for a follow up visit for multiple medical problems. His current presenting problems are   1. Open fracture of phalanx of right great toe, physeal involvement unspecified, unspecified phalanx, initial encounter    2. Open fracture of phalanx of lesser toe of right foot, physeal involvement unspecified, unspecified phalanx, initial encounter    3. Sprain of right foot, initial encounter    4. Crushing injury of right foot, initial encounter    5. Contusion of right foot, initial encounter    . As per information/history obtained from the PADT(patient assessment and documentation tool) - He complains of pain in the lower back and knees Right with radiation to the buttocks, hips Right, upper leg Right, lower leg Bilateral, ankles Bilateral and feet Bilateral He rates the pain 8/10 and describes it as burning, throbbing. Pain is made worse by: walking, standing, bending. Current treatment regimen has helped relieve about 20% of the pain. He denies side effects from the current pain regimen. Patient reports that since the last follow up visit the physical functioning is unchanged, family/social relationships are unchanged, mood is unchanged and sleep patterns are unchanged, and that the overall functioning is unchanged. Patient denies neurological bowel or bladder. Patient denies misusing/abusing his narcotic pain medications or using any illegal drugs. There are No indicators for possible drug abuse, addiction or diversion problems. Upon obtaining the medical history from Mr. Fiona Yañez regarding today's office visit for his presenting problems, Mr. Fiona Yañez states he is doing fair, pain has been baseline, says he still works. He mentions he is working full time still. He says he has not seen Ortho for 2nd opinion. He states he is using Lyrica. Not sure if patient is complaint with his medications. ALLERGIES: Patients list of allergies were reviewed     MEDICATIONS: Mr. Tariq Pérez list of medications were reviewed. His current medications are   Outpatient Medications Prior to Visit   Medication Sig Dispense Refill    meloxicam (MOBIC) 15 MG tablet Take 1 tablet by mouth daily 30 tablet 1    folic acid-pyridoxine-cyanocobalamine (FOLBEE) 2.5-25-1 MG TABS tablet Take 1 tablet by mouth daily 30 tablet 0    nortriptyline (PAMELOR) 25 MG capsule Take 1-2 capsules by mouth nightly 60 capsule 0    Diclofenac Sodium POWD 2 g by Does not apply route 3 times daily #3 D Baclo 2%, Diclo 3%, Abeba 6%, Lido 2%, Prilo 2% Pharm to comp 240 g 5    pregabalin (LYRICA) 100 MG capsule Take one capsule in the morning and two capsules at night 90 capsule 0     No facility-administered medications prior to visit. SOCIAL/FAMILY/PAST MEDICAL HISTORY: Mr. Allyson Flaherty, family and past medical history was reviewed. REVIEW OF SYSTEMS:    Respiratory: Negative for apnea, chest tightness and shortness of breath or change in baseline breathing. Gastrointestinal: Negative for nausea, vomiting, abdominal pain, diarrhea, constipation, blood in stool and abdominal distention. PHYSICAL EXAM:   Nursing note and vitals reviewed. BP (!) 152/87   Pulse 63   Wt 220 lb (99.8 kg)   BMI 32.49 kg/m²   Constitutional: He appears well-developed and well-nourished. No acute distress. Skin: Skin is warm and dry, good turgor. No rash noted. He is not diaphoretic. Cardiovascular: Normal rate, regular rhythm, normal heart sounds, and does not have murmur. Pulmonary/Chest: Effort normal. No respiratory distress. He does not have wheezes in the lung fields. He has no rales. Neurological/Psychiatric:He is alert and oriented to person, place, and time. Coordination is  normal.  His mood isAppropriate and affect is Neutral/Euthymic(normal) . IMPRESSION:   1.  Open fracture of phalanx of right great toe, physeal involvement unspecified, unspecified phalanx, initial encounter    2. Open fracture of phalanx of lesser toe of right foot, physeal involvement unspecified, unspecified phalanx, initial encounter    3. Sprain of right foot, initial encounter    4. Crushing injury of right foot, initial encounter    5. Contusion of right foot, initial encounter        PLAN:  Informed verbal consent was obtained  -[place] record was obtained and reviewed  for the last one year and no indicators of drug misuse  were found. Any other controlled substance prescriptions  seen on the record have been accounted for, I am aware of the patient receiving these medications. Laura Schwab [place] record will be rechecked as part of office protocol.   -Patient was advised to bring in all their medication bottles on the next follow up visit for medication review.    -Discussed use, benefit, and side effects of prescribed medications. Barriers to medication compliance addressed. All patient questions answered. Pt voiced understanding.    -He was advised to increase fluids ( 5-7  glasses of fluid daily), limit caffeine, avoid cheese products, increase dietary fiber, increase activity and exercise as tolerated and relax regularly and enjoy meals   -monitor closely  -Adv Biofeedback, relaxation and meditation techniques. Referral to psychologist for CBT was also discussed with patient  -Urine drug screen with GC/MS confirmation for opiates and drugs of abuse was reviewed and the results are positive for [facility] of abuse and the prescribed medications were present. The medications' drug  levels are normal    -Chronic opioid treatment protocol was discussed with the patient again including taking medications only as prescribed , using one pharmacy and getting all controlled substances from one physician unless okayed with me.  Proper safeguarding and disposal of medications were also discussed with the patient.    -will taper off the opioids   Current Outpatient Medications Medication Sig Dispense Refill    meloxicam (MOBIC) 15 MG tablet Take 1 tablet by mouth daily 30 tablet 1    folic acid-pyridoxine-cyanocobalamine (FOLBEE) 2.5-25-1 MG TABS tablet Take 1 tablet by mouth daily 30 tablet 0    nortriptyline (PAMELOR) 25 MG capsule Take 1-2 capsules by mouth nightly 60 capsule 0    Diclofenac Sodium POWD 2 g by Does not apply route 3 times daily #3 D Baclo 2%, Diclo 3%, Abeba 6%, Lido 2%, Prilo 2% Pharm to comp 240 g 5    pregabalin (LYRICA) 100 MG capsule Take one capsule in the morning and two capsules at night 90 capsule 0     No current facility-administered medications for this visit. I will continue his current medication regimen  which is part of the above treatment schedule. It has been helping with Mr. Mita Brady chronic  medical problems which for this visit include:   Diagnoses of Open fracture of phalanx of right great toe, physeal involvement unspecified, unspecified phalanx, initial encounter, Open fracture of phalanx of lesser toe of right foot, physeal involvement unspecified, unspecified phalanx, initial encounter, Sprain of right foot, initial encounter, Crushing injury of right foot, initial encounter, and Contusion of right foot, initial encounter were pertinent to this visit. Risks and benefits of the medications and other alternative treatments  including no treatment were discussed with the patient. The common side effects of these medications were also explained to the patient. Informed verbal consent was obtained. Goals of current treatment regimen include improvement in pain, restoration of functioning- with focus on improvement in physical performance, general activity, work or disability,emotional distress, health care utilization and  decreased medication consumption. Will continue to monitor progress towards achieving/maintaining therapeutic goals with special emphasis on  1. Improvement in perceived interfernce  of pain with ADL's.  Ability to do home exercises independently. Ability to do household chores indoor and/or outdoor work and social and leisure activities. Improve psychosocial and physical functioning. - he is showing progression towards this treatment goal with the current regimen. 2. Ability to focus/concentrate at work and perform the duties required of him at work  Sit through a workday without lower extremity symptoms. Stand 30-60 minutes without lower extremity symptoms. Ability to lift up to 10-20 lbs. Ability to go up and down stairs. Sit 30-60 minutes  Without having to stand up frequently. - he is maintaining/progressing towards his work related goals with the current regimen. He was advised against drinking alcohol with the narcotic pain medicines, advised against driving or handling machinery while adjusting the dose of medicines or if having cognitive  issues related to the current medications. Risk of overdose and death, if medicines not taken as prescribed, were also discussed. If the patient develops new symptoms or if the symptoms worsen, the patient should call the office. While transcribing every attempt was made to maintain the accuracy of the note in terms of it's contents,there may have been some errors made inadvertently. Thank you for allowing me to participate in the care of this patient. Helen Acuna MD.    Cc: No primary care provider on file. Damon Cottrell, scribing for in the presence  of Dr. Helen Acuna.   06/14/19  4:19 PM  Avelino Allen Crown Assistant    I, Dr. Helen Acuna, personally performed the services described in this documentation as scribed by  Trupti Lomax MA in my presence and it is both accurate and complete

## 2019-07-19 ENCOUNTER — TELEPHONE (OUTPATIENT)
Dept: PAIN MANAGEMENT | Age: 54
End: 2019-07-19

## 2019-07-19 ENCOUNTER — OFFICE VISIT (OUTPATIENT)
Dept: PAIN MANAGEMENT | Age: 54
End: 2019-07-19
Payer: COMMERCIAL

## 2019-07-19 VITALS
SYSTOLIC BLOOD PRESSURE: 194 MMHG | DIASTOLIC BLOOD PRESSURE: 93 MMHG | HEART RATE: 67 BPM | WEIGHT: 230 LBS | BODY MASS INDEX: 33.97 KG/M2

## 2019-07-19 DIAGNOSIS — S92.401B OPEN FRACTURE OF PHALANX OF RIGHT GREAT TOE, PHYSEAL INVOLVEMENT UNSPECIFIED, UNSPECIFIED PHALANX, INITIAL ENCOUNTER: ICD-10-CM

## 2019-07-19 DIAGNOSIS — S90.31XA CONTUSION OF RIGHT FOOT, INITIAL ENCOUNTER: ICD-10-CM

## 2019-07-19 DIAGNOSIS — S93.601A SPRAIN OF RIGHT FOOT, INITIAL ENCOUNTER: ICD-10-CM

## 2019-07-19 DIAGNOSIS — S92.501B OPEN FRACTURE OF PHALANX OF LESSER TOE OF RIGHT FOOT, PHYSEAL INVOLVEMENT UNSPECIFIED, UNSPECIFIED PHALANX, INITIAL ENCOUNTER: ICD-10-CM

## 2019-07-19 DIAGNOSIS — S97.81XA CRUSHING INJURY OF RIGHT FOOT, INITIAL ENCOUNTER: ICD-10-CM

## 2019-07-19 PROCEDURE — 99214 OFFICE O/P EST MOD 30 MIN: CPT | Performed by: INTERNAL MEDICINE

## 2019-07-19 RX ORDER — OXYCODONE HYDROCHLORIDE AND ACETAMINOPHEN 5; 325 MG/1; MG/1
1 TABLET ORAL 3 TIMES DAILY
Qty: 84 TABLET | Refills: 0 | Status: SHIPPED | OUTPATIENT
Start: 2019-07-19 | End: 2019-08-15 | Stop reason: ALTCHOICE

## 2019-07-19 RX ORDER — MELOXICAM 15 MG/1
15 TABLET ORAL DAILY
Qty: 30 TABLET | Refills: 1 | Status: SHIPPED | OUTPATIENT
Start: 2019-07-19 | End: 2019-09-12 | Stop reason: SDUPTHER

## 2019-07-19 RX ORDER — DESVENLAFAXINE 50 MG/1
50 TABLET, EXTENDED RELEASE ORAL DAILY
Qty: 30 TABLET | Refills: 0 | Status: SHIPPED | OUTPATIENT
Start: 2019-07-19 | End: 2019-08-15 | Stop reason: SDUPTHER

## 2019-07-19 RX ORDER — AMITRIPTYLINE HYDROCHLORIDE 25 MG/1
25-50 TABLET, FILM COATED ORAL NIGHTLY
Qty: 60 TABLET | Refills: 0 | Status: SHIPPED | OUTPATIENT
Start: 2019-07-19 | End: 2019-08-10 | Stop reason: SDUPTHER

## 2019-07-19 RX ORDER — PREGABALIN 100 MG/1
CAPSULE ORAL
Qty: 90 CAPSULE | Refills: 0 | Status: SHIPPED | OUTPATIENT
Start: 2019-07-19 | End: 2019-08-15 | Stop reason: SDUPTHER

## 2019-07-19 RX ORDER — OXYCODONE HYDROCHLORIDE AND ACETAMINOPHEN 5; 325 MG/1; MG/1
1 TABLET ORAL EVERY 6 HOURS PRN
Qty: 30 TABLET | Refills: 0 | Status: SHIPPED | OUTPATIENT
Start: 2019-07-19 | End: 2019-07-19 | Stop reason: CLARIF

## 2019-07-19 NOTE — TELEPHONE ENCOUNTER
[facility] called to say they cannot do the compound Diclofenac Sodium POWD  and it will need to be sent to a compounding pharmacy.

## 2019-07-19 NOTE — PROGRESS NOTES
near bedtime and use of electronic devices near bedtime   -Start Elavil 25 mg 1-2 at night   -Start Pristiq 50 mg   -He was advised to increase fluids ( 5-7  glasses of fluid daily), limit caffeine, avoid cheese products, increase dietary fiber, increase activity and exercise as tolerated and relax regularly and enjoy meals   -Walking as tolerated   -. Repeat UDS   -Chronic opioid treatment protocol was discussed with the patient again including taking medications only as prescribed , using one pharmacy and getting all controlled substances from one physician unless okayed with me. Proper safeguarding and disposal of medications were also discussed with the patient. Mr. Ave Coyle will be prescribed  the medications  listed below which are for treatment of his presenting  medical problems which for this visit include:   Diagnoses of Open fracture of phalanx of right great toe, physeal involvement unspecified, unspecified phalanx, initial encounter, Open fracture of phalanx of lesser toe of right foot, physeal involvement unspecified, unspecified phalanx, initial encounter, Sprain of right foot, initial encounter, Crushing injury of right foot, initial encounter, and Contusion of right foot, initial encounter were pertinent to this visit.   Medications/orders associated with this visit:    Current Outpatient Medications   Medication Sig Dispense Refill    Diclofenac Sodium POWD 2 g by Does not apply route 3 times daily #3 D Baclo 2%, Diclo 3%, Abeba 6%, Lido 2%, Prilo 2% Pharm to comp 240 g 5    meloxicam (MOBIC) 15 MG tablet Take 1 tablet by mouth daily 30 tablet 1    folic acid-pyridoxine-cyanocobalamine (FOLBEE) 2.5-25-1 MG TABS tablet Take 1 tablet by mouth daily 30 tablet 0    nortriptyline (PAMELOR) 25 MG capsule Take 1-2 capsules by mouth nightly 60 capsule 0    pregabalin (LYRICA) 100 MG capsule Take one capsule in the morning and two capsules at night 90 capsule 0     No current facility-administered

## 2019-08-10 DIAGNOSIS — S92.501B OPEN FRACTURE OF PHALANX OF LESSER TOE OF RIGHT FOOT, PHYSEAL INVOLVEMENT UNSPECIFIED, UNSPECIFIED PHALANX, INITIAL ENCOUNTER: ICD-10-CM

## 2019-08-10 DIAGNOSIS — S97.81XA CRUSHING INJURY OF RIGHT FOOT, INITIAL ENCOUNTER: ICD-10-CM

## 2019-08-10 DIAGNOSIS — S92.401B OPEN FRACTURE OF PHALANX OF RIGHT GREAT TOE, PHYSEAL INVOLVEMENT UNSPECIFIED, UNSPECIFIED PHALANX, INITIAL ENCOUNTER: ICD-10-CM

## 2019-08-10 DIAGNOSIS — S90.31XA CONTUSION OF RIGHT FOOT, INITIAL ENCOUNTER: ICD-10-CM

## 2019-08-10 DIAGNOSIS — S93.601A SPRAIN OF RIGHT FOOT, INITIAL ENCOUNTER: ICD-10-CM

## 2019-08-12 ENCOUNTER — TELEPHONE (OUTPATIENT)
Dept: PAIN MANAGEMENT | Age: 54
End: 2019-08-12

## 2019-08-12 DIAGNOSIS — S90.31XA CONTUSION OF RIGHT FOOT, INITIAL ENCOUNTER: ICD-10-CM

## 2019-08-12 DIAGNOSIS — S92.401B OPEN FRACTURE OF PHALANX OF RIGHT GREAT TOE, PHYSEAL INVOLVEMENT UNSPECIFIED, UNSPECIFIED PHALANX, INITIAL ENCOUNTER: ICD-10-CM

## 2019-08-12 DIAGNOSIS — S92.501B OPEN FRACTURE OF PHALANX OF LESSER TOE OF RIGHT FOOT, PHYSEAL INVOLVEMENT UNSPECIFIED, UNSPECIFIED PHALANX, INITIAL ENCOUNTER: ICD-10-CM

## 2019-08-12 DIAGNOSIS — S97.81XA CRUSHING INJURY OF RIGHT FOOT, INITIAL ENCOUNTER: ICD-10-CM

## 2019-08-12 DIAGNOSIS — S93.601A SPRAIN OF RIGHT FOOT, INITIAL ENCOUNTER: ICD-10-CM

## 2019-08-13 DIAGNOSIS — M54.5 LOW BACK PAIN, UNSPECIFIED BACK PAIN LATERALITY, UNSPECIFIED CHRONICITY, WITH SCIATICA PRESENCE UNSPECIFIED: Primary | ICD-10-CM

## 2019-08-14 RX ORDER — AMITRIPTYLINE HYDROCHLORIDE 25 MG/1
25-50 TABLET, FILM COATED ORAL NIGHTLY
Qty: 60 TABLET | Refills: 0 | Status: SHIPPED | OUTPATIENT
Start: 2019-08-14 | End: 2019-08-15 | Stop reason: SDUPTHER

## 2019-08-14 NOTE — TELEPHONE ENCOUNTER
Per [facility] brady give letter, Letter was completed and is in the Fi book. Patient did not answer, LVM

## 2019-08-15 ENCOUNTER — OFFICE VISIT (OUTPATIENT)
Dept: PAIN MANAGEMENT | Age: 54
End: 2019-08-15
Payer: COMMERCIAL

## 2019-08-15 VITALS
BODY MASS INDEX: 33.97 KG/M2 | HEART RATE: 84 BPM | DIASTOLIC BLOOD PRESSURE: 97 MMHG | SYSTOLIC BLOOD PRESSURE: 183 MMHG | WEIGHT: 230 LBS

## 2019-08-15 DIAGNOSIS — S90.31XA CONTUSION OF RIGHT FOOT, INITIAL ENCOUNTER: ICD-10-CM

## 2019-08-15 DIAGNOSIS — S92.501B OPEN FRACTURE OF PHALANX OF LESSER TOE OF RIGHT FOOT, PHYSEAL INVOLVEMENT UNSPECIFIED, UNSPECIFIED PHALANX, INITIAL ENCOUNTER: ICD-10-CM

## 2019-08-15 DIAGNOSIS — S92.401B OPEN FRACTURE OF PHALANX OF RIGHT GREAT TOE, PHYSEAL INVOLVEMENT UNSPECIFIED, UNSPECIFIED PHALANX, INITIAL ENCOUNTER: ICD-10-CM

## 2019-08-15 DIAGNOSIS — S97.81XA CRUSHING INJURY OF RIGHT FOOT, INITIAL ENCOUNTER: ICD-10-CM

## 2019-08-15 DIAGNOSIS — S93.601A SPRAIN OF RIGHT FOOT, INITIAL ENCOUNTER: ICD-10-CM

## 2019-08-15 PROCEDURE — 99214 OFFICE O/P EST MOD 30 MIN: CPT | Performed by: INTERNAL MEDICINE

## 2019-08-15 RX ORDER — AMITRIPTYLINE HYDROCHLORIDE 25 MG/1
25-50 TABLET, FILM COATED ORAL NIGHTLY
Qty: 60 TABLET | Refills: 0 | Status: SHIPPED | OUTPATIENT
Start: 2019-08-15 | End: 2019-09-12

## 2019-08-15 RX ORDER — DESVENLAFAXINE 50 MG/1
50 TABLET, EXTENDED RELEASE ORAL DAILY
Qty: 30 TABLET | Refills: 0 | Status: SHIPPED | OUTPATIENT
Start: 2019-08-15 | End: 2019-09-12 | Stop reason: SDUPTHER

## 2019-08-15 RX ORDER — PREGABALIN 100 MG/1
CAPSULE ORAL
Qty: 90 CAPSULE | Refills: 0 | Status: SHIPPED | OUTPATIENT
Start: 2019-08-15 | End: 2019-09-12 | Stop reason: SDUPTHER

## 2019-08-15 NOTE — PROGRESS NOTES
same dose   -he was advised proper sleep hygiene-told to avoid:use of caffeine or other stimulants after noon, alcohol use near bedtime, long or frequent naps during the day, erratic sleep schedule, heavy meals near bedtime, vigorous exercise near bedtime and use of electronic devices near bedtime   -Discussed use, benefit, and side effects of prescribed medications. Barriers to medication compliance addressed. All patient questions answered. Pt voiced understanding.    -He was advised to increase fluids ( 5-7  glasses of fluid daily), limit caffeine, avoid cheese products, increase dietary fiber, increase activity and exercise as tolerated and relax regularly and enjoy meals   -Urine drug screen with GC/MS confirmation for opiates and drugs of abuse was reviewed and the results are positive for drugs of abuse low dose of THC  and the prescribed medications were present. The medications' drug  levels are normal   -States he has been off it for about 6 weeks, Will recheck UDS at next visit. Mr. Sarah Hernandez will be prescribed  the medications  listed below which are for treatment of his presenting  medical problems which for this visit include:   Diagnoses of Open fracture of phalanx of right great toe, physeal involvement unspecified, unspecified phalanx, initial encounter, Open fracture of phalanx of lesser toe of right foot, physeal involvement unspecified, unspecified phalanx, initial encounter, Sprain of right foot, initial encounter, Crushing injury of right foot, initial encounter, and Contusion of right foot, initial encounter were pertinent to this visit.   Medications/orders associated with this visit:    Current Outpatient Medications   Medication Sig Dispense Refill    amitriptyline (ELAVIL) 25 MG tablet TAKE 1-2 TABLETS BY MOUTH NIGHTLY 60 tablet 0    meloxicam (MOBIC) 15 MG tablet Take 1 tablet by mouth daily 30 tablet 1    folic acid-pyridoxine-cyanocobalamine (FOLBEE) 2.5-25-1 MG TABS tablet Take 1

## 2019-08-16 DIAGNOSIS — S90.31XA CONTUSION OF RIGHT FOOT, INITIAL ENCOUNTER: ICD-10-CM

## 2019-08-16 DIAGNOSIS — S92.501B OPEN FRACTURE OF PHALANX OF LESSER TOE OF RIGHT FOOT, PHYSEAL INVOLVEMENT UNSPECIFIED, UNSPECIFIED PHALANX, INITIAL ENCOUNTER: ICD-10-CM

## 2019-08-16 DIAGNOSIS — S97.81XA CRUSHING INJURY OF RIGHT FOOT, INITIAL ENCOUNTER: ICD-10-CM

## 2019-08-16 DIAGNOSIS — S92.401B OPEN FRACTURE OF PHALANX OF RIGHT GREAT TOE, PHYSEAL INVOLVEMENT UNSPECIFIED, UNSPECIFIED PHALANX, INITIAL ENCOUNTER: ICD-10-CM

## 2019-08-16 DIAGNOSIS — S93.601A SPRAIN OF RIGHT FOOT, INITIAL ENCOUNTER: ICD-10-CM

## 2019-08-16 RX ORDER — OXYCODONE HYDROCHLORIDE AND ACETAMINOPHEN 5; 325 MG/1; MG/1
1 TABLET ORAL 3 TIMES DAILY
Qty: 84 TABLET | Refills: 0 | Status: SHIPPED | OUTPATIENT
Start: 2019-08-16 | End: 2019-09-12 | Stop reason: SDUPTHER

## 2019-09-07 DIAGNOSIS — S92.501B OPEN FRACTURE OF PHALANX OF LESSER TOE OF RIGHT FOOT, PHYSEAL INVOLVEMENT UNSPECIFIED, UNSPECIFIED PHALANX, INITIAL ENCOUNTER: ICD-10-CM

## 2019-09-07 DIAGNOSIS — S93.601A SPRAIN OF RIGHT FOOT, INITIAL ENCOUNTER: ICD-10-CM

## 2019-09-07 DIAGNOSIS — S97.81XA CRUSHING INJURY OF RIGHT FOOT, INITIAL ENCOUNTER: ICD-10-CM

## 2019-09-07 DIAGNOSIS — S90.31XA CONTUSION OF RIGHT FOOT, INITIAL ENCOUNTER: ICD-10-CM

## 2019-09-07 DIAGNOSIS — S92.401B OPEN FRACTURE OF PHALANX OF RIGHT GREAT TOE, PHYSEAL INVOLVEMENT UNSPECIFIED, UNSPECIFIED PHALANX, INITIAL ENCOUNTER: ICD-10-CM

## 2019-09-09 RX ORDER — AMITRIPTYLINE HYDROCHLORIDE 25 MG/1
TABLET, FILM COATED ORAL
Qty: 60 TABLET | Refills: 0 | Status: SHIPPED | OUTPATIENT
Start: 2019-09-09 | End: 2019-09-12 | Stop reason: SDUPTHER

## 2019-09-12 ENCOUNTER — OFFICE VISIT (OUTPATIENT)
Dept: PAIN MANAGEMENT | Age: 54
End: 2019-09-12
Payer: COMMERCIAL

## 2019-09-12 VITALS
WEIGHT: 240 LBS | DIASTOLIC BLOOD PRESSURE: 95 MMHG | HEART RATE: 73 BPM | BODY MASS INDEX: 35.44 KG/M2 | SYSTOLIC BLOOD PRESSURE: 157 MMHG

## 2019-09-12 DIAGNOSIS — S90.31XA CONTUSION OF RIGHT FOOT, INITIAL ENCOUNTER: ICD-10-CM

## 2019-09-12 DIAGNOSIS — S92.501B OPEN FRACTURE OF PHALANX OF LESSER TOE OF RIGHT FOOT, PHYSEAL INVOLVEMENT UNSPECIFIED, UNSPECIFIED PHALANX, INITIAL ENCOUNTER: ICD-10-CM

## 2019-09-12 DIAGNOSIS — S92.401B OPEN FRACTURE OF PHALANX OF RIGHT GREAT TOE, PHYSEAL INVOLVEMENT UNSPECIFIED, UNSPECIFIED PHALANX, INITIAL ENCOUNTER: ICD-10-CM

## 2019-09-12 DIAGNOSIS — S97.81XA CRUSHING INJURY OF RIGHT FOOT, INITIAL ENCOUNTER: ICD-10-CM

## 2019-09-12 DIAGNOSIS — S93.601A SPRAIN OF RIGHT FOOT, INITIAL ENCOUNTER: ICD-10-CM

## 2019-09-12 PROCEDURE — 99214 OFFICE O/P EST MOD 30 MIN: CPT | Performed by: INTERNAL MEDICINE

## 2019-09-12 RX ORDER — MELOXICAM 15 MG/1
15 TABLET ORAL DAILY
Qty: 30 TABLET | Refills: 1 | Status: SHIPPED | OUTPATIENT
Start: 2019-09-12 | End: 2019-10-10 | Stop reason: SDUPTHER

## 2019-09-12 RX ORDER — DESVENLAFAXINE 50 MG/1
TABLET, EXTENDED RELEASE ORAL
Qty: 30 TABLET | Refills: 0 | OUTPATIENT
Start: 2019-09-12

## 2019-09-12 RX ORDER — DESVENLAFAXINE 50 MG/1
50 TABLET, EXTENDED RELEASE ORAL DAILY
Qty: 30 TABLET | Refills: 0 | Status: SHIPPED | OUTPATIENT
Start: 2019-09-12 | End: 2019-10-10

## 2019-09-12 RX ORDER — PREGABALIN 100 MG/1
CAPSULE ORAL
Qty: 90 CAPSULE | Refills: 0 | Status: SHIPPED | OUTPATIENT
Start: 2019-09-12 | End: 2019-10-10

## 2019-09-12 RX ORDER — OXYCODONE HYDROCHLORIDE AND ACETAMINOPHEN 5; 325 MG/1; MG/1
1 TABLET ORAL 3 TIMES DAILY
Qty: 84 TABLET | Refills: 0 | Status: SHIPPED | OUTPATIENT
Start: 2019-09-12 | End: 2019-10-10 | Stop reason: ALTCHOICE

## 2019-09-12 RX ORDER — AMITRIPTYLINE HYDROCHLORIDE 25 MG/1
TABLET, FILM COATED ORAL
Qty: 60 TABLET | Refills: 0 | Status: SHIPPED | OUTPATIENT
Start: 2019-09-12 | End: 2019-10-10

## 2019-09-12 NOTE — PROGRESS NOTES
Bre Delacruz  1965  [id number]    HISTORY OF PRESENT ILLNESS:  Mr. Fiona Villanueva is a 62 y.o. male returns for a follow up visit for multiple medical problems. His  presenting problems are   1. Open fracture of phalanx of right great toe, physeal involvement unspecified, unspecified phalanx, initial encounter    2. Open fracture of phalanx of lesser toe of right foot, physeal involvement unspecified, unspecified phalanx, initial encounter    3. Sprain of right foot, initial encounter    4. Crushing injury of right foot, initial encounter    5. Contusion of right foot, initial encounter    . As per information/history obtained from the PADT(patient assessment and documentation tool) -  He complains of pain in the lower back and knees Right with radiation to the buttocks, hips Right, upper leg Right, lower leg Right, ankles Right and feet Right He rates the pain 8/10 and describes it as aching, burning, throbbing. Pain is made worse by: walking, standing, sitting, bending, lifting. Current treatment regimen has helped relieve about 40% of the pain. He denies side effects from the current pain regimen. Patient reports that since the last follow up visit the physical functioning is unchanged, family/social relationships are worse, mood is unchanged and sleep patterns are unchanged, and that the overall functioning is worse. Patient denies neurological bowel or bladder. Patient denies misusing/abusing his narcotic pain medications or using any illegal drugs. There are No indicators for possible drug abuse, addiction or diversion problems. Upon obtaining the medical history from Mr. Fiona Villanueva regarding today's office visit for his presenting problems, Mr. Fiona Villanueva states he still doing the same, he has been off work for 4 months and wants to return to work, he lost everything. Patient denies any side effects with the medications. Patient has not been complaint with his regimen. Patient states his sleep is fair.  Has fairly normal sleep latency. Averages about 4-6 hours of sleep a night. Denies any signs of sleep apnea. Feels somewhat rested in the morning. Patient's  subjective report of his mood is fair. he describes occasional symptoms of depression, occasional  irritability and some mood swings. Describes his mood as being neutral and reports some pleasure in his daily activities. Reports  fair  appetite, energy and concentration. Able to function well in different aspects of his daily activities. Denies suicidal or homicidal ideation. Denies any complaints of increased tension, does   Worry sometimes and occasional  irritability  he denies any c/o increased anxiety, No c/o panic attacks or symptoms of PTSD. Patient reports his weight has been stable. 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   Social History     Socioeconomic History    Marital status: Unknown     Spouse name: Not on file    Number of children: Not on file    Years of education: Not on file    Highest education level: Not on file   Occupational History    Not on file   Social Needs    Financial resource strain: Not on file    Food insecurity:     Worry: Not on file     Inability: Not on file    Transportation needs:     Medical: Not on file     Non-medical: Not on file   Tobacco Use    Smoking status: Never Smoker    Smokeless tobacco: Never Used   Substance and Sexual Activity    Alcohol use: Not on file    Drug use: Not on file    Sexual activity: Not on file   Lifestyle    Physical activity:     Days per week: Not on file     Minutes per session: Not on file    Stress: Not on file   Relationships    Social connections:     Talks on phone: Not on file     Gets together: Not on file     Attends Catholic service: Not on file     Active member of club or organization: Not on file     Attends meetings of clubs or organizations: Not mouth daily 30 tablet 0    meloxicam (MOBIC) 15 MG tablet Take 1 tablet by mouth daily 30 tablet 1     No current facility-administered medications for this visit. Goals of current treatment regimen include improvement in pain, restoration of functioning- with focus on improvement in physical performance, general activity, work or disability,emotional distress, health care utilization and  decreased medication consumption. Will continue to monitor progress towards achieving/maintaining therapeutic goals with special emphasis on  1. Improvement in perceived interfernce  of pain with ADL's. Ability to do home exercises independently. Ability to do household chores indoor and/or outdoor work and social and leisure activities. To increase flexibility/ROM, strength and endurance. Improve psychosocial and physical functioning.- he is not showing any significant progress/or showing regression  towards this goal and reassessment and adjustment of goals/treatment have been made. 2. Improving sleep to 6-7 hours a night. Improve mood/ anxiety and depression symptoms such as crying spells, low energy, problems with concentration, motivation.- he is not showing any significant progress/or showing regression  towards this goal and reassessment and adjustment of goals/treatment have been made. 3. Reduction of reliance on opioid analgesia/more appropriate opioid use. - he is showing progression towards this treatment goal with the current regimen. Risks and benefits of the medications and other alternative treatments have been/were  discussed with the patient. Any questions on the  common side effects of these medications were also answered. He was advised against drinking alcohol with the narcotic pain medicines, advised against driving or handling machinery when  starting or adjusting the dose of medicines, feeling groggy or drowsy, or if having any cognitive issues related to the current medications.  Heis fully

## 2019-09-23 ENCOUNTER — HOSPITAL ENCOUNTER (EMERGENCY)
Age: 54
Discharge: HOME | End: 2019-09-24
Attending: EMERGENCY MEDICINE
Payer: MEDICAID

## 2019-09-23 DIAGNOSIS — T14.8XXA BRUISING: Primary | ICD-10-CM

## 2019-09-23 DIAGNOSIS — I10 HYPERTENSION, UNSPECIFIED TYPE: ICD-10-CM

## 2019-09-23 PROCEDURE — 99283 EMERGENCY DEPT VISIT LOW MDM: CPT

## 2019-09-23 ASSESSMENT — PAIN DESCRIPTION - ONSET: ONSET: ON-GOING

## 2019-09-23 ASSESSMENT — PAIN DESCRIPTION - LOCATION: LOCATION: ARM

## 2019-09-23 ASSESSMENT — PAIN DESCRIPTION - DESCRIPTORS: DESCRIPTORS: ACHING

## 2019-09-23 ASSESSMENT — PAIN DESCRIPTION - PROGRESSION: CLINICAL_PROGRESSION: NOT CHANGED

## 2019-09-23 ASSESSMENT — PAIN SCALES - GENERAL: PAINLEVEL_OUTOF10: 7

## 2019-09-23 ASSESSMENT — PAIN DESCRIPTION - ORIENTATION: ORIENTATION: RIGHT

## 2019-09-23 ASSESSMENT — PAIN DESCRIPTION - FREQUENCY: FREQUENCY: CONTINUOUS

## 2019-09-23 ASSESSMENT — PAIN - FUNCTIONAL ASSESSMENT: PAIN_FUNCTIONAL_ASSESSMENT: PREVENTS OR INTERFERES SOME ACTIVE ACTIVITIES AND ADLS

## 2019-09-23 ASSESSMENT — PAIN DESCRIPTION - PAIN TYPE: TYPE: ACUTE PAIN

## 2019-09-24 ENCOUNTER — APPOINTMENT (OUTPATIENT)
Dept: GENERAL RADIOLOGY | Age: 54
End: 2019-09-24
Payer: MEDICAID

## 2019-09-24 VITALS
HEIGHT: 69 IN | HEART RATE: 66 BPM | WEIGHT: 221.56 LBS | DIASTOLIC BLOOD PRESSURE: 105 MMHG | OXYGEN SATURATION: 98 % | BODY MASS INDEX: 32.82 KG/M2 | RESPIRATION RATE: 17 BRPM | SYSTOLIC BLOOD PRESSURE: 165 MMHG | TEMPERATURE: 97.8 F

## 2019-09-24 LAB
A/G RATIO: 1.1 (ref 1.1–2.2)
ALBUMIN SERPL-MCNC: 4.4 G/DL (ref 3.4–5)
ALP BLD-CCNC: 83 U/L (ref 40–129)
ALT SERPL-CCNC: 40 U/L (ref 10–40)
ANION GAP SERPL CALCULATED.3IONS-SCNC: 17 MMOL/L (ref 3–16)
AST SERPL-CCNC: 32 U/L (ref 15–37)
BASOPHILS ABSOLUTE: 0 K/UL (ref 0–0.2)
BASOPHILS RELATIVE PERCENT: 0.5 %
BILIRUB SERPL-MCNC: 0.4 MG/DL (ref 0–1)
BUN BLDV-MCNC: 12 MG/DL (ref 7–20)
CALCIUM SERPL-MCNC: 9.5 MG/DL (ref 8.3–10.6)
CHLORIDE BLD-SCNC: 98 MMOL/L (ref 99–110)
CO2: 25 MMOL/L (ref 21–32)
CREAT SERPL-MCNC: 1.1 MG/DL (ref 0.9–1.3)
EOSINOPHILS ABSOLUTE: 0.1 K/UL (ref 0–0.6)
EOSINOPHILS RELATIVE PERCENT: 1.4 %
GFR AFRICAN AMERICAN: >60
GFR NON-AFRICAN AMERICAN: >60
GLOBULIN: 3.9 G/DL
GLUCOSE BLD-MCNC: 94 MG/DL (ref 70–99)
HCT VFR BLD CALC: 52.6 % (ref 40.5–52.5)
HEMOGLOBIN: 17.8 G/DL (ref 13.5–17.5)
INR BLD: 0.92 (ref 0.86–1.14)
LYMPHOCYTES ABSOLUTE: 1.7 K/UL (ref 1–5.1)
LYMPHOCYTES RELATIVE PERCENT: 19 %
MCH RBC QN AUTO: 30.8 PG (ref 26–34)
MCHC RBC AUTO-ENTMCNC: 33.9 G/DL (ref 31–36)
MCV RBC AUTO: 90.9 FL (ref 80–100)
MONOCYTES ABSOLUTE: 0.6 K/UL (ref 0–1.3)
MONOCYTES RELATIVE PERCENT: 7.2 %
NEUTROPHILS ABSOLUTE: 6.2 K/UL (ref 1.7–7.7)
NEUTROPHILS RELATIVE PERCENT: 71.9 %
PDW BLD-RTO: 12.9 % (ref 12.4–15.4)
PLATELET # BLD: 196 K/UL (ref 135–450)
PMV BLD AUTO: 8.5 FL (ref 5–10.5)
POTASSIUM SERPL-SCNC: 3.5 MMOL/L (ref 3.5–5.1)
PROTHROMBIN TIME: 10.5 SEC (ref 9.8–13)
RBC # BLD: 5.79 M/UL (ref 4.2–5.9)
REASON FOR REJECTION: NORMAL
REJECTED TEST: NORMAL
SODIUM BLD-SCNC: 140 MMOL/L (ref 136–145)
TOTAL PROTEIN: 8.3 G/DL (ref 6.4–8.2)
WBC # BLD: 8.7 K/UL (ref 4–11)

## 2019-09-24 PROCEDURE — 80053 COMPREHEN METABOLIC PANEL: CPT

## 2019-09-24 PROCEDURE — 73090 X-RAY EXAM OF FOREARM: CPT

## 2019-09-24 PROCEDURE — 85610 PROTHROMBIN TIME: CPT

## 2019-09-24 PROCEDURE — 85025 COMPLETE CBC W/AUTO DIFF WBC: CPT

## 2019-09-24 PROCEDURE — 36415 COLL VENOUS BLD VENIPUNCTURE: CPT

## 2019-09-24 ASSESSMENT — ENCOUNTER SYMPTOMS
COLOR CHANGE: 1
SHORTNESS OF BREATH: 0

## 2019-09-24 NOTE — ED TRIAGE NOTES
Bruising on right arm with swelling, not on any blood thinners currently, does not recall any injury to area

## 2019-09-24 NOTE — ED PROVIDER NOTES
[address] [place] [address]   Phone [phone]   PROTIME-INR       All other labs were within normal range or not returned as of this dictation. EMERGENCY DEPARTMENT COURSE and DIFFERENTIAL DIAGNOSIS/MDM:   Vitals:    Vitals:    09/23/19 2350 09/24/19 0017 09/24/19 0028 09/24/19 0040   BP: (!) 185/90 (!) 165/105     Pulse: 66      Resp: 17      Temp: 97.8 °F (36.6 °C)      TempSrc: Oral      SpO2: 98% 98% 100% 98%   Weight: 221 lb 9 oz (100.5 kg)      Height: 5' 9\" (1.753 m)        I discussed with Soren Sheikh and/or family the exam results, diagnosis, care, prognosis, reasons to return and the importance of follow up. Patient and/or family is in full agreement with plan and all questions have been answered. Specific discharge instructions explained, including reasons to return to the emergency department. Soren Sheikh is well appearing, non-toxic, and afebrile at the time of discharge. Patient has small circular bruise to right upper arm in a bruise to the right forearm. Pulses are intact. Compartments are soft. No anemia, platelets are normal.  Normal renal function and liver function. INR as above. Will instruct to use cold compresses follow-up with primary care specifically follow-up regarding elevated blood pressure and return for any new, worsening or other concerns. I estimate there is LOW risk for COMPARTMENT SYNDROME, TENDON OR NEUROVASCULAR INJURY, OR FOREIGN BODY, thus I consider the discharge disposition reasonable. Also, there is no evidence or peritonitis, sepsis, or toxicity. CONSULTS:  None    PROCEDURES:  None    FINAL IMPRESSION      1. Bruising    2.  Hypertension, unspecified type          DISPOSITION/PLAN   DISPOSITION Decision To Discharge 09/24/2019 01:53:50 AM      PATIENT REFERRED TO:  [facility]) Pre-Services  [phone]          DISCHARGE MEDICATIONS:  New Prescriptions    No medications on file       (Please note that portions of this note were

## 2019-10-10 ENCOUNTER — OFFICE VISIT (OUTPATIENT)
Dept: PAIN MANAGEMENT | Age: 54
End: 2019-10-10
Payer: COMMERCIAL

## 2019-10-10 VITALS
DIASTOLIC BLOOD PRESSURE: 107 MMHG | HEART RATE: 95 BPM | WEIGHT: 221 LBS | BODY MASS INDEX: 32.64 KG/M2 | SYSTOLIC BLOOD PRESSURE: 183 MMHG

## 2019-10-10 DIAGNOSIS — S92.501B OPEN FRACTURE OF PHALANX OF LESSER TOE OF RIGHT FOOT, PHYSEAL INVOLVEMENT UNSPECIFIED, UNSPECIFIED PHALANX, INITIAL ENCOUNTER: ICD-10-CM

## 2019-10-10 DIAGNOSIS — S97.81XA CRUSHING INJURY OF RIGHT FOOT, INITIAL ENCOUNTER: ICD-10-CM

## 2019-10-10 DIAGNOSIS — S93.601A SPRAIN OF RIGHT FOOT, INITIAL ENCOUNTER: ICD-10-CM

## 2019-10-10 DIAGNOSIS — S90.31XA CONTUSION OF RIGHT FOOT, INITIAL ENCOUNTER: ICD-10-CM

## 2019-10-10 DIAGNOSIS — S92.401B OPEN FRACTURE OF PHALANX OF RIGHT GREAT TOE, PHYSEAL INVOLVEMENT UNSPECIFIED, UNSPECIFIED PHALANX, INITIAL ENCOUNTER: ICD-10-CM

## 2019-10-10 PROCEDURE — 99213 OFFICE O/P EST LOW 20 MIN: CPT | Performed by: INTERNAL MEDICINE

## 2019-10-10 RX ORDER — MELOXICAM 15 MG/1
15 TABLET ORAL DAILY
Qty: 30 TABLET | Refills: 1 | Status: SHIPPED | OUTPATIENT
Start: 2019-10-10 | End: 2019-11-21 | Stop reason: SDUPTHER

## 2019-10-10 RX ORDER — BUPRENORPHINE 10 UG/H
1 PATCH TRANSDERMAL WEEKLY
Qty: 6 PATCH | Refills: 0 | Status: SHIPPED | OUTPATIENT
Start: 2019-10-10 | End: 2019-11-21

## 2019-10-10 RX ORDER — AMITRIPTYLINE HYDROCHLORIDE 25 MG/1
TABLET, FILM COATED ORAL
Qty: 60 TABLET | Refills: 0 | OUTPATIENT
Start: 2019-10-10

## 2019-10-15 ENCOUNTER — TELEPHONE (OUTPATIENT)
Dept: PAIN MANAGEMENT | Age: 54
End: 2019-10-15

## 2019-10-16 ENCOUNTER — TELEPHONE (OUTPATIENT)
Dept: ORTHOPEDIC SURGERY | Age: 54
End: 2019-10-16

## 2019-10-27 ENCOUNTER — HOSPITAL ENCOUNTER (EMERGENCY)
Age: 54
Discharge: HOME | End: 2019-10-27
Attending: EMERGENCY MEDICINE
Payer: COMMERCIAL

## 2019-10-27 ENCOUNTER — APPOINTMENT (OUTPATIENT)
Dept: GENERAL RADIOLOGY | Age: 54
End: 2019-10-27
Payer: COMMERCIAL

## 2019-10-27 VITALS
DIASTOLIC BLOOD PRESSURE: 104 MMHG | TEMPERATURE: 98.7 F | HEART RATE: 72 BPM | HEIGHT: 69 IN | OXYGEN SATURATION: 98 % | WEIGHT: 225.31 LBS | BODY MASS INDEX: 33.37 KG/M2 | SYSTOLIC BLOOD PRESSURE: 185 MMHG

## 2019-10-27 DIAGNOSIS — M79.604 PAIN OF RIGHT LOWER EXTREMITY: Primary | ICD-10-CM

## 2019-10-27 PROCEDURE — 73630 X-RAY EXAM OF FOOT: CPT

## 2019-10-27 PROCEDURE — 99283 EMERGENCY DEPT VISIT LOW MDM: CPT

## 2019-10-27 RX ORDER — TRAMADOL HYDROCHLORIDE 50 MG/1
50 TABLET ORAL EVERY 4 HOURS PRN
Qty: 5 TABLET | Refills: 0 | Status: SHIPPED | OUTPATIENT
Start: 2019-10-27 | End: 2019-10-29

## 2019-10-27 ASSESSMENT — PAIN SCALES - GENERAL
PAINLEVEL_OUTOF10: 8
PAINLEVEL_OUTOF10: 5

## 2019-10-27 ASSESSMENT — ENCOUNTER SYMPTOMS
BACK PAIN: 0
SHORTNESS OF BREATH: 0
ABDOMINAL PAIN: 0

## 2019-10-27 ASSESSMENT — PAIN DESCRIPTION - ORIENTATION: ORIENTATION: RIGHT

## 2019-10-27 ASSESSMENT — PAIN - FUNCTIONAL ASSESSMENT: PAIN_FUNCTIONAL_ASSESSMENT: ACTIVITIES ARE NOT PREVENTED

## 2019-10-27 ASSESSMENT — PAIN DESCRIPTION - FREQUENCY: FREQUENCY: CONTINUOUS

## 2019-10-27 ASSESSMENT — PAIN DESCRIPTION - PAIN TYPE: TYPE: ACUTE PAIN

## 2019-10-27 ASSESSMENT — PAIN DESCRIPTION - PROGRESSION: CLINICAL_PROGRESSION: NOT CHANGED

## 2019-10-27 ASSESSMENT — PAIN DESCRIPTION - DESCRIPTORS: DESCRIPTORS: SHARP;THROBBING

## 2019-10-27 ASSESSMENT — PAIN DESCRIPTION - ONSET: ONSET: ON-GOING

## 2019-11-21 ENCOUNTER — OFFICE VISIT (OUTPATIENT)
Dept: PAIN MANAGEMENT | Age: 54
End: 2019-11-21
Payer: COMMERCIAL

## 2019-11-21 VITALS
SYSTOLIC BLOOD PRESSURE: 155 MMHG | HEART RATE: 103 BPM | BODY MASS INDEX: 35.44 KG/M2 | DIASTOLIC BLOOD PRESSURE: 102 MMHG | WEIGHT: 240 LBS

## 2019-11-21 DIAGNOSIS — S92.401B OPEN FRACTURE OF PHALANX OF RIGHT GREAT TOE, PHYSEAL INVOLVEMENT UNSPECIFIED, UNSPECIFIED PHALANX, INITIAL ENCOUNTER: ICD-10-CM

## 2019-11-21 DIAGNOSIS — S92.501B OPEN FRACTURE OF PHALANX OF LESSER TOE OF RIGHT FOOT, PHYSEAL INVOLVEMENT UNSPECIFIED, UNSPECIFIED PHALANX, INITIAL ENCOUNTER: ICD-10-CM

## 2019-11-21 DIAGNOSIS — S97.81XA CRUSHING INJURY OF RIGHT FOOT, INITIAL ENCOUNTER: ICD-10-CM

## 2019-11-21 DIAGNOSIS — S93.601A SPRAIN OF RIGHT FOOT, INITIAL ENCOUNTER: ICD-10-CM

## 2019-11-21 DIAGNOSIS — S90.31XA CONTUSION OF RIGHT FOOT, INITIAL ENCOUNTER: ICD-10-CM

## 2019-11-21 PROCEDURE — 99213 OFFICE O/P EST LOW 20 MIN: CPT | Performed by: INTERNAL MEDICINE

## 2019-11-21 RX ORDER — MELOXICAM 15 MG/1
15 TABLET ORAL DAILY
Qty: 30 TABLET | Refills: 0 | Status: SHIPPED | OUTPATIENT
Start: 2019-11-21 | End: 2020-07-07 | Stop reason: ALTCHOICE

## 2019-12-11 ENCOUNTER — OFFICE VISIT (OUTPATIENT)
Dept: ORTHOPEDIC SURGERY | Age: 54
End: 2019-12-11
Payer: COMMERCIAL

## 2019-12-11 VITALS — RESPIRATION RATE: 16 BRPM | HEIGHT: 69 IN | WEIGHT: 240 LBS | BODY MASS INDEX: 35.55 KG/M2

## 2019-12-11 DIAGNOSIS — S90.31XS CONTUSION OF RIGHT FOOT, SEQUELA: Primary | ICD-10-CM

## 2019-12-11 DIAGNOSIS — G90.521 COMPLEX REGIONAL PAIN SYNDROME TYPE 1 OF RIGHT LOWER EXTREMITY: ICD-10-CM

## 2019-12-11 PROCEDURE — 99214 OFFICE O/P EST MOD 30 MIN: CPT | Performed by: ORTHOPAEDIC SURGERY

## 2019-12-11 RX ORDER — MELOXICAM 7.5 MG/1
7.5 TABLET ORAL DAILY
Qty: 30 TABLET | Refills: 0 | Status: SHIPPED | OUTPATIENT
Start: 2019-12-11 | End: 2020-07-07 | Stop reason: ALTCHOICE

## 2019-12-13 PROBLEM — G90.521 COMPLEX REGIONAL PAIN SYNDROME TYPE 1 OF RIGHT LOWER EXTREMITY: Status: ACTIVE | Noted: 2019-12-13

## 2019-12-20 ENCOUNTER — TELEPHONE (OUTPATIENT)
Dept: ORTHOPEDIC SURGERY | Age: 54
End: 2019-12-20

## 2019-12-27 ENCOUNTER — HOSPITAL ENCOUNTER (OUTPATIENT)
Dept: MRI IMAGING | Age: 54
Discharge: HOME | End: 2019-12-27
Payer: COMMERCIAL

## 2019-12-27 DIAGNOSIS — S90.31XS CONTUSION OF RIGHT FOOT, SEQUELA: ICD-10-CM

## 2019-12-27 PROCEDURE — 73718 MRI LOWER EXTREMITY W/O DYE: CPT

## 2020-01-08 ENCOUNTER — OFFICE VISIT (OUTPATIENT)
Dept: ORTHOPEDIC SURGERY | Age: 55
End: 2020-01-08
Payer: COMMERCIAL

## 2020-01-08 VITALS — BODY MASS INDEX: 35.55 KG/M2 | WEIGHT: 240 LBS | HEIGHT: 69 IN | RESPIRATION RATE: 16 BRPM

## 2020-01-08 PROCEDURE — 99214 OFFICE O/P EST MOD 30 MIN: CPT | Performed by: ORTHOPAEDIC SURGERY

## 2020-01-09 ENCOUNTER — TELEPHONE (OUTPATIENT)
Dept: ORTHOPEDIC SURGERY | Age: 55
End: 2020-01-09

## 2020-01-09 NOTE — TELEPHONE ENCOUNTER
Called Karl at listed phone number at home and also at his Mobile number, no answer. A detailed message was left regarding pain medication. No pain medication will be given per Dr. Karina Warner. Patient may use OTC pain meds as labeled. Patient may call office if any other questions. Please see prior message.

## 2020-01-09 NOTE — TELEPHONE ENCOUNTER
Pt wife called about her  referral to PM. The facility the pt selected, [facility] needs a referral fax over. Pt also, req for pain meds for the pain he is in. Please advise.      Fx [phone]  [phone]    Call  [phone] to discuss    Pharm [facility] [phone]
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
## 2020-02-06 ENCOUNTER — HOSPITAL ENCOUNTER (EMERGENCY)
Age: 55
Discharge: HOME | End: 2020-02-07
Payer: MEDICAID

## 2020-02-06 VITALS
DIASTOLIC BLOOD PRESSURE: 104 MMHG | TEMPERATURE: 99.8 F | BODY MASS INDEX: 35.75 KG/M2 | WEIGHT: 235.89 LBS | OXYGEN SATURATION: 95 % | SYSTOLIC BLOOD PRESSURE: 161 MMHG | RESPIRATION RATE: 18 BRPM | HEART RATE: 99 BPM | HEIGHT: 68 IN

## 2020-02-06 PROCEDURE — 10060 I&D ABSCESS SIMPLE/SINGLE: CPT

## 2020-02-06 PROCEDURE — 99282 EMERGENCY DEPT VISIT SF MDM: CPT

## 2020-02-06 ASSESSMENT — PAIN DESCRIPTION - PAIN TYPE: TYPE: ACUTE PAIN

## 2020-02-06 ASSESSMENT — PAIN SCALES - GENERAL: PAINLEVEL_OUTOF10: 10

## 2020-02-06 ASSESSMENT — PAIN DESCRIPTION - LOCATION: LOCATION: NECK

## 2020-02-06 ASSESSMENT — PAIN DESCRIPTION - DESCRIPTORS: DESCRIPTORS: ACHING

## 2020-02-07 PROCEDURE — 6370000000 HC RX 637 (ALT 250 FOR IP): Performed by: NURSE PRACTITIONER

## 2020-02-07 RX ORDER — OXYCODONE HYDROCHLORIDE AND ACETAMINOPHEN 5; 325 MG/1; MG/1
1 TABLET ORAL ONCE
Status: COMPLETED | OUTPATIENT
Start: 2020-02-07 | End: 2020-02-07

## 2020-02-07 RX ORDER — CEPHALEXIN 500 MG/1
500 CAPSULE ORAL 4 TIMES DAILY
Qty: 40 CAPSULE | Refills: 0 | Status: SHIPPED | OUTPATIENT
Start: 2020-02-07 | End: 2020-02-17

## 2020-02-07 RX ORDER — OXYCODONE HYDROCHLORIDE AND ACETAMINOPHEN 5; 325 MG/1; MG/1
1 TABLET ORAL EVERY 6 HOURS PRN
Qty: 10 TABLET | Refills: 0 | Status: SHIPPED | OUTPATIENT
Start: 2020-02-07 | End: 2020-02-10

## 2020-02-07 RX ORDER — CEPHALEXIN 500 MG/1
500 CAPSULE ORAL ONCE
Status: COMPLETED | OUTPATIENT
Start: 2020-02-07 | End: 2020-02-07

## 2020-02-07 RX ORDER — SULFAMETHOXAZOLE AND TRIMETHOPRIM 800; 160 MG/1; MG/1
1 TABLET ORAL ONCE
Status: COMPLETED | OUTPATIENT
Start: 2020-02-07 | End: 2020-02-07

## 2020-02-07 RX ORDER — SULFAMETHOXAZOLE AND TRIMETHOPRIM 800; 160 MG/1; MG/1
1 TABLET ORAL 2 TIMES DAILY
Qty: 20 TABLET | Refills: 0 | Status: SHIPPED | OUTPATIENT
Start: 2020-02-07 | End: 2020-02-17

## 2020-02-07 RX ADMIN — CEPHALEXIN 500 MG: 500 CAPSULE ORAL at 02:08

## 2020-02-07 RX ADMIN — SULFAMETHOXAZOLE AND TRIMETHOPRIM 1 TABLET: 800; 160 TABLET ORAL at 02:08

## 2020-02-07 RX ADMIN — OXYCODONE HYDROCHLORIDE AND ACETAMINOPHEN 1 TABLET: 5; 325 TABLET ORAL at 02:08

## 2020-02-07 ASSESSMENT — PAIN DESCRIPTION - LOCATION
LOCATION: NECK
LOCATION: NECK

## 2020-02-07 ASSESSMENT — PAIN SCALES - GENERAL
PAINLEVEL_OUTOF10: 10
PAINLEVEL_OUTOF10: 8

## 2020-02-07 ASSESSMENT — PAIN DESCRIPTION - DESCRIPTORS
DESCRIPTORS: ACHING
DESCRIPTORS: ACHING

## 2020-02-07 ASSESSMENT — PAIN DESCRIPTION - PAIN TYPE
TYPE: ACUTE PAIN
TYPE: ACUTE PAIN

## 2020-02-07 NOTE — ED PROVIDER NOTES
ALLERGIES    Allergies   Allergen Reactions    Lyrica [Pregabalin] Hives    Cymbalta [Duloxetine Hcl] Itching and Rash       SOCIAL & FAMILY HISTORY    Social History     Socioeconomic History    Marital status: Single     Spouse name: None    Number of children: None    Years of education: None    Highest education level: None   Occupational History    None   Social Needs    Financial resource strain: None    Food insecurity:     Worry: None     Inability: None    Transportation needs:     Medical: None     Non-medical: None   Tobacco Use    Smoking status: Never Smoker    Smokeless tobacco: Never Used   Substance and Sexual Activity    Alcohol use: Not Currently    Drug use: Never    Sexual activity: Not Currently   Lifestyle    Physical activity:     Days per week: None     Minutes per session: None    Stress: None   Relationships    Social connections:     Talks on phone: None     Gets together: None     Attends Orthodox service: None     Active member of club or organization: None     Attends meetings of clubs or organizations: None     Relationship status: None    Intimate partner violence:     Fear of current or ex partner: None     Emotionally abused: None     Physically abused: None     Forced sexual activity: None   Other Topics Concern    None   Social History Narrative    None     History reviewed. No pertinent family history.     PHYSICAL EXAM    VITAL SIGNS: BP (!) 161/104   Pulse 99   Temp 99.8 °F (37.7 °C) (Oral)   Resp 18   Ht 5' 8\" (1.727 m)   Wt 235 lb 14.3 oz (107 kg)   SpO2 95%   BMI 35.87 kg/m²   Constitutional:  Well developed, well nourished, no acute distress  HENT:  Atraumatic, no facial or lip swelling  Oral:  No tongue swelling, airway patent  Neck: no swelling  Respiratory:  No respiratory distress, breathing comfortably  Cardiovascular:  no JVD   Musculoskeletal:  No edema, no acute deformities  Integument:  + 8x4 cm area of tenderness, induration, and fluctuance located to the posterior neck with minimal surrounding erythema          RADIOLOGY  No orders to display       PROCEDURE  Incision and Drainage Procedure Note  Consent:  Verbal  Consent given by:  Patient  Risks discussed:  Pain and incomplete drainage  Alternatives discussed:  Delayed treatment  Location:   Type:  Abscess  Location: Anterior neck  Anesthesia (see MAR for exact dosages): Anesthesia method:  Local infiltration  Local anesthetic:  Lidocaine 1% w/ epi  Procedure complexity:  Simple  Procedure details:   Incision types:  Single straight  Incision depth:  Subcutaneous  Scalpel blade:  11  Wound management:  Probed and deloculated  Drainage:  Purulent    Drainage amount: Copious   Wound treatment:  Wound left open  Packing materials:  None  Patient tolerance of procedure: Tolerated well, no immediate complications    ED COURSE & MEDICAL DECISION MAKING    See chart for details of medications prescribed. Vitals:    02/06/20 2350   BP: (!) 161/104   Pulse: 99   Resp: 18   Temp: 99.8 °F (37.7 °C)   TempSrc: Oral   SpO2: 95%   Weight: 235 lb 14.3 oz (107 kg)   Height: 5' 8\" (1.727 m)       Differential diagnosis: necrotizing fasciitis, deep space soft tissue bacterial skin infection, viral rash, systemic infectious rash, aseptic cyst, malignancy, lymphadenopathy, other    Patient is afebrile and nontoxic in appearance. Bactrim prescribed as empiric treatment for possible MRSA etiology. Keflex prescribed for coverage of concomitant cellulitis. I instructed the patient to follow up in 2 days for wound recheck. I instructed the patient to return to the ED immediately for any new or worsening symptoms. The patient verbalized understanding. FINAL IMPRESSION    1.  Infected sebaceous cyst        PLAN  Discharge with close outpatient follow-up (see EMR)       (Please note that this note was completed with a voice recognition program.  Every attempt was made to edit the dictations, but inevitably there remain words that are mis-transcribed.)          Carlos Wheeler, SONALI - EUGENIO  02/07/20 0217

## 2020-02-07 NOTE — ED NOTES
Discharge and education instructions reviewed. Patient verbalized understanding, teach-back successful. Patient denied questions at this time. No acute distress noted. Patient instructed to follow-up as noted - return to emergency department if symptoms worsen. Patient verbalized understanding. Discharged per EDMD with discharged instructions.        Kalpana Morgan RN  02/07/20 9216

## 2020-02-25 ENCOUNTER — TELEPHONE (OUTPATIENT)
Dept: FAMILY MEDICINE CLINIC | Age: 55
End: 2020-02-25

## 2020-03-25 PROBLEM — S92.911B: Status: RESOLVED | Noted: 2018-11-30 | Resolved: 2020-03-24

## 2020-06-03 ENCOUNTER — OFFICE VISIT (OUTPATIENT)
Dept: ORTHOPEDIC SURGERY | Age: 55
End: 2020-06-03
Payer: COMMERCIAL

## 2020-06-03 VITALS — WEIGHT: 235.89 LBS | HEIGHT: 68 IN | BODY MASS INDEX: 35.75 KG/M2

## 2020-06-03 PROCEDURE — 99213 OFFICE O/P EST LOW 20 MIN: CPT | Performed by: ORTHOPAEDIC SURGERY

## 2020-06-03 NOTE — PROGRESS NOTES
deep and light palpation over the entire right foot        IMAGING: Xray's were reviewed, taken 12/11/2019 in the office, 3 views of the right  foot, and showed no displaced fracture. Right hallux rigidus. MRI right foot dated 12/27/2019 was reviewed and showed   No acute fracture or significant bone marrow edema.       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 IMPRESSION:    1- Right foot pain/ foot contusion  2- Right foot chronic pain/ possible RSD. 3- Right hallux rigidus. PLAN:  I assured the patient that the MRI and xray is negative for acute fracture or other pathology except great toe DJD. We discussed the risk of RSD given his generalized pain and sensitivity to touch. Since he is continuing to have significant symptoms with normal MRI, we recommend pain management, referral given to Dr. Julissa Garner. F/U PRN.       Camille Howard MD

## 2020-06-24 ENCOUNTER — HOSPITAL ENCOUNTER (OUTPATIENT)
Age: 55
Discharge: HOME | End: 2020-06-24
Payer: MEDICAID

## 2020-06-24 LAB
EKG ATRIAL RATE: 60 BPM
EKG DIAGNOSIS: NORMAL
EKG P AXIS: 64 DEGREES
EKG P-R INTERVAL: 168 MS
EKG Q-T INTERVAL: 418 MS
EKG QRS DURATION: 98 MS
EKG QTC CALCULATION (BAZETT): 418 MS
EKG R AXIS: 7 DEGREES
EKG T AXIS: 55 DEGREES
EKG VENTRICULAR RATE: 60 BPM

## 2020-06-24 PROCEDURE — 93005 ELECTROCARDIOGRAM TRACING: CPT

## 2020-07-07 ENCOUNTER — OFFICE VISIT (OUTPATIENT)
Dept: FAMILY MEDICINE CLINIC | Age: 55
End: 2020-07-07
Payer: MEDICAID

## 2020-07-07 VITALS
DIASTOLIC BLOOD PRESSURE: 82 MMHG | HEART RATE: 78 BPM | WEIGHT: 244.6 LBS | RESPIRATION RATE: 16 BRPM | SYSTOLIC BLOOD PRESSURE: 136 MMHG | HEIGHT: 68 IN | TEMPERATURE: 98.2 F | OXYGEN SATURATION: 98 % | BODY MASS INDEX: 37.07 KG/M2

## 2020-07-07 PROBLEM — E66.9 OBESITY, CLASS II, BMI 35-39.9: Status: ACTIVE | Noted: 2020-07-07

## 2020-07-07 PROBLEM — E66.812 OBESITY, CLASS II, BMI 35-39.9: Status: ACTIVE | Noted: 2020-07-07

## 2020-07-07 PROBLEM — I10 ESSENTIAL HYPERTENSION: Status: ACTIVE | Noted: 2020-07-07

## 2020-07-07 PROCEDURE — G8417 CALC BMI ABV UP PARAM F/U: HCPCS | Performed by: NURSE PRACTITIONER

## 2020-07-07 PROCEDURE — G8427 DOCREV CUR MEDS BY ELIG CLIN: HCPCS | Performed by: NURSE PRACTITIONER

## 2020-07-07 PROCEDURE — 99203 OFFICE O/P NEW LOW 30 MIN: CPT | Performed by: NURSE PRACTITIONER

## 2020-07-07 PROCEDURE — 3017F COLORECTAL CA SCREEN DOC REV: CPT | Performed by: NURSE PRACTITIONER

## 2020-07-07 PROCEDURE — 1036F TOBACCO NON-USER: CPT | Performed by: NURSE PRACTITIONER

## 2020-07-07 RX ORDER — ADHESIVE BANDAGE 3/4"
BANDAGE TOPICAL
Qty: 1 EACH | Refills: 0 | Status: SHIPPED | OUTPATIENT
Start: 2020-07-07

## 2020-07-07 RX ORDER — AMLODIPINE BESYLATE 5 MG/1
5 TABLET ORAL DAILY
Qty: 30 TABLET | Refills: 1 | Status: SHIPPED | OUTPATIENT
Start: 2020-07-07 | End: 2020-07-30

## 2020-07-07 SDOH — HEALTH STABILITY: MENTAL HEALTH: HOW OFTEN DO YOU HAVE A DRINK CONTAINING ALCOHOL?: 2-4 TIMES A MONTH

## 2020-07-07 SDOH — HEALTH STABILITY: MENTAL HEALTH: HOW MANY STANDARD DRINKS CONTAINING ALCOHOL DO YOU HAVE ON A TYPICAL DAY?: 1 OR 2

## 2020-07-07 ASSESSMENT — ENCOUNTER SYMPTOMS
NAUSEA: 0
BLOOD IN STOOL: 0
CONSTIPATION: 0
BACK PAIN: 1
VOMITING: 0
SHORTNESS OF BREATH: 0
COUGH: 0
DIARRHEA: 0
ABDOMINAL PAIN: 0

## 2020-07-07 ASSESSMENT — PATIENT HEALTH QUESTIONNAIRE - PHQ9
2. FEELING DOWN, DEPRESSED OR HOPELESS: 0
SUM OF ALL RESPONSES TO PHQ9 QUESTIONS 1 & 2: 0
SUM OF ALL RESPONSES TO PHQ QUESTIONS 1-9: 0
SUM OF ALL RESPONSES TO PHQ QUESTIONS 1-9: 0
1. LITTLE INTEREST OR PLEASURE IN DOING THINGS: 0
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
## 2020-07-24 DIAGNOSIS — Z11.4 SCREENING FOR HUMAN IMMUNODEFICIENCY VIRUS: ICD-10-CM

## 2020-07-24 DIAGNOSIS — E66.9 OBESITY, CLASS II, BMI 35-39.9: ICD-10-CM

## 2020-07-24 DIAGNOSIS — I10 ESSENTIAL HYPERTENSION: ICD-10-CM

## 2020-07-24 DIAGNOSIS — Z11.59 NEED FOR HEPATITIS C SCREENING TEST: ICD-10-CM

## 2020-07-24 LAB
A/G RATIO: 1.3 (ref 1.1–2.2)
ALBUMIN SERPL-MCNC: 4 G/DL (ref 3.4–5)
ALP BLD-CCNC: 79 U/L (ref 40–129)
ALT SERPL-CCNC: 32 U/L (ref 10–40)
ANION GAP SERPL CALCULATED.3IONS-SCNC: 15 MMOL/L (ref 3–16)
AST SERPL-CCNC: 28 U/L (ref 15–37)
BILIRUB SERPL-MCNC: 0.4 MG/DL (ref 0–1)
BUN BLDV-MCNC: 9 MG/DL (ref 7–20)
CALCIUM SERPL-MCNC: 9 MG/DL (ref 8.3–10.6)
CHLORIDE BLD-SCNC: 100 MMOL/L (ref 99–110)
CHOLESTEROL, FASTING: 279 MG/DL (ref 0–199)
CO2: 26 MMOL/L (ref 21–32)
CREAT SERPL-MCNC: 1 MG/DL (ref 0.9–1.3)
GFR AFRICAN AMERICAN: >60
GFR NON-AFRICAN AMERICAN: >60
GLOBULIN: 3.1 G/DL
GLUCOSE FASTING: 94 MG/DL (ref 70–99)
HDLC SERPL-MCNC: 45 MG/DL (ref 40–60)
HEPATITIS C ANTIBODY INTERPRETATION: NORMAL
LDL CHOLESTEROL CALCULATED: 211 MG/DL
POTASSIUM SERPL-SCNC: 3.7 MMOL/L (ref 3.5–5.1)
SODIUM BLD-SCNC: 141 MMOL/L (ref 136–145)
TOTAL PROTEIN: 7.1 G/DL (ref 6.4–8.2)
TRIGLYCERIDE, FASTING: 116 MG/DL (ref 0–150)
TSH REFLEX: 3.64 UIU/ML (ref 0.27–4.2)
VLDLC SERPL CALC-MCNC: 23 MG/DL

## 2020-07-25 LAB
HIV AG/AB: NORMAL
HIV ANTIGEN: NORMAL
HIV-1 ANTIBODY: NORMAL
HIV-2 AB: NORMAL

## 2020-07-27 PROBLEM — E78.00 HYPERCHOLESTEROLEMIA: Status: ACTIVE | Noted: 2020-07-27

## 2020-07-27 RX ORDER — ATORVASTATIN CALCIUM 40 MG/1
40 TABLET, FILM COATED ORAL DAILY
Qty: 90 TABLET | Refills: 1 | Status: SHIPPED | OUTPATIENT
Start: 2020-07-27 | End: 2021-01-22

## 2020-07-28 LAB
CONTROL: NORMAL
HEMOCCULT STL QL: NORMAL

## 2020-07-28 PROCEDURE — 82274 ASSAY TEST FOR BLOOD FECAL: CPT | Performed by: NURSE PRACTITIONER

## 2020-07-29 ENCOUNTER — OFFICE VISIT (OUTPATIENT)
Dept: SURGERY | Age: 55
End: 2020-07-29
Payer: MEDICAID

## 2020-07-29 ENCOUNTER — OFFICE VISIT (OUTPATIENT)
Dept: PRIMARY CARE CLINIC | Age: 55
End: 2020-07-29
Payer: MEDICAID

## 2020-07-29 VITALS
SYSTOLIC BLOOD PRESSURE: 136 MMHG | WEIGHT: 244 LBS | DIASTOLIC BLOOD PRESSURE: 80 MMHG | BODY MASS INDEX: 36.98 KG/M2 | HEIGHT: 68 IN

## 2020-07-29 PROBLEM — L72.3 SEBACEOUS CYST: Status: ACTIVE | Noted: 2020-07-29

## 2020-07-29 PROCEDURE — G8427 DOCREV CUR MEDS BY ELIG CLIN: HCPCS | Performed by: SURGERY

## 2020-07-29 PROCEDURE — 99211 OFF/OP EST MAY X REQ PHY/QHP: CPT | Performed by: NURSE PRACTITIONER

## 2020-07-29 PROCEDURE — G8417 CALC BMI ABV UP PARAM F/U: HCPCS | Performed by: NURSE PRACTITIONER

## 2020-07-29 PROCEDURE — G8428 CUR MEDS NOT DOCUMENT: HCPCS | Performed by: NURSE PRACTITIONER

## 2020-07-29 PROCEDURE — 99243 OFF/OP CNSLTJ NEW/EST LOW 30: CPT | Performed by: SURGERY

## 2020-07-29 PROCEDURE — G8417 CALC BMI ABV UP PARAM F/U: HCPCS | Performed by: SURGERY

## 2020-07-29 ASSESSMENT — ENCOUNTER SYMPTOMS: RESPIRATORY NEGATIVE: 1

## 2020-07-29 NOTE — PROGRESS NOTES
Subjective:      Patient ID: Modesto Huerta is a 60 y.o. male. HPI  Chief Complaint: cyst  Patient referred by Clau Hoskins CNP for evaluation of cyst. Patient reports symptoms of drainage, pain. Location of symptoms is upper back/inferior posterior neck. Symptoms were first noted 5 months ago after it became infected and was drained in ED. Reports ongoing drainage. Alleviated by nothing. Symptoms aggravated by nothing. Previous evaluation includes I+D in ED in February. Reports cyst was present for years before then but just continued to increase in size. Patient has a history of HTN, HLP. Will plan following treatment: excision posterior neck cyst.        Past Medical History:   Diagnosis Date    HTN (hypertension)        Past Surgical History:   Procedure Laterality Date    FACIAL SURGERY  2005    KNEE SURGERY Left 2010       Current Outpatient Medications   Medication Sig Dispense Refill    atorvastatin (LIPITOR) 40 MG tablet Take 1 tablet by mouth daily 90 tablet 1    Pregabalin (LYRICA PO) Take by mouth 2 times daily      amLODIPine (NORVASC) 5 MG tablet Take 1 tablet by mouth daily 30 tablet 1    Blood Pressure Monitoring (BLOOD PRESSURE CUFF) MISC Use to check BP daily 1 each 0     No current facility-administered medications for this visit. Prior to Admission medications    Medication Sig Start Date End Date Taking?  Authorizing Provider   atorvastatin (LIPITOR) 40 MG tablet Take 1 tablet by mouth daily 7/27/20  Yes SONALI Nick CNP   Pregabalin (LYRICA PO) Take by mouth 2 times daily   Yes Historical Provider, MD   amLODIPine (NORVASC) 5 MG tablet Take 1 tablet by mouth daily 7/7/20  Yes SONALI Nick CNP   Blood Pressure Monitoring (BLOOD PRESSURE CUFF) MISC Use to check BP daily 7/7/20  Yes SONALI Nick CNP         Allergies   Allergen Reactions    Lyrica [Pregabalin] Hives    Cymbalta [Duloxetine Hcl] Itching and Rash       Social History     Socioeconomic History    Marital status: Single     Spouse name: Not on file    Number of children: 1    Years of education: Not on file    Highest education level: Not on file   Occupational History    Not on file   Social Needs    Financial resource strain: Not on file    Food insecurity     Worry: Not on file     Inability: Not on file    Transportation needs     Medical: Not on file     Non-medical: Not on file   Tobacco Use    Smoking status: Never Smoker    Smokeless tobacco: Never Used   Substance and Sexual Activity    Alcohol use: Yes     Frequency: 2-4 times a month     Drinks per session: 1 or 2     Comment: couple of drinks per month     Drug use: Never    Sexual activity: Yes     Partners: Female   Lifestyle    Physical activity     Days per week: Not on file     Minutes per session: Not on file    Stress: Not on file   Relationships    Social connections     Talks on phone: Not on file     Gets together: Not on file     Attends Hinduism service: Not on file     Active member of club or organization: Not on file     Attends meetings of clubs or organizations: Not on file     Relationship status: Not on file    Intimate partner violence     Fear of current or ex partner: Not on file     Emotionally abused: Not on file     Physically abused: Not on file     Forced sexual activity: Not on file   Other Topics Concern    Not on file   Social History Narrative    Not on file       History reviewed. No pertinent family history. Review of Systems   Constitutional: Negative. Respiratory: Negative. Cardiovascular: Negative. Musculoskeletal: Positive for arthralgias and neck pain. Skin: Positive for wound. Hematological: Negative. All other systems reviewed and are negative. Objective:   Physical Exam  Vitals signs reviewed. Constitutional:       General: He is not in acute distress. Appearance: He is well-developed. He is not diaphoretic.    HENT: Head: Normocephalic and atraumatic. Right Ear: External ear normal.      Left Ear: External ear normal.   Eyes:      Conjunctiva/sclera: Conjunctivae normal.      Pupils: Pupils are equal, round, and reactive to light. Neck:      Musculoskeletal: Normal range of motion and neck supple. Trachea: Trachea normal.     Cardiovascular:      Rate and Rhythm: Normal rate and regular rhythm. Heart sounds: Normal heart sounds, S1 normal and S2 normal.   Pulmonary:      Effort: Pulmonary effort is normal. No respiratory distress. Breath sounds: Normal breath sounds. No wheezing. Abdominal:      General: There is no distension. Palpations: Abdomen is soft. Musculoskeletal: Normal range of motion. Skin:     General: Skin is warm and dry. Findings: No erythema. Neurological:      Mental Status: He is alert and oriented to person, place, and time. Psychiatric:         Behavior: Behavior normal. Behavior is cooperative. Thought Content: Thought content normal.         Judgment: Judgment normal.         Assessment:       Diagnosis Orders   1. Sebaceous cyst             Plan:      Offered excision of neck cyst in OR  The risks, benefits and alternatives to the planned procedure were discussed. Patient expressed an understanding and is willing to proceed. Will need negative Covid preop  The patient was counseled at length about the risks of davis Covid-19 during their perioperative period and any recovery window from their procedure. The patient was made aware that davis Covid-19  may worsen their prognosis for recovering from their procedure  and lend to a higher morbidity and/or mortality risk. All material risks, benefits, and reasonable alternatives including postponing the procedure were discussed. The patient does wish to proceed with the procedure at this time.               Akilah Baldwin MD

## 2020-07-29 NOTE — PROGRESS NOTES
Patient presented to [facility] drive up clinic for preop testing. Patient was swabbed and given information advising them to remain isolated until procedure date.

## 2020-07-29 NOTE — PATIENT INSTRUCTIONS
Neck cyst removal scheduled for 8/3/20 at 7:30 arrive at 6. Nothing to eat or drink after midnight. You will need someone to bring you home. You will need covid test today.

## 2020-07-30 NOTE — PROGRESS NOTES
[address] [facility] [place] time_____0600_______        Surgery time____________    Take the following medications with a sip of water: Follow your Doctor's pre procedure instructions regarding medications    Do not eat or drink anything after 12:00 midnight prior to your surgery. This includes water chewing gum, mints and ice chips. You may brush your teeth and gargle the morning of your surgery, but do not swallow the water     Please see your family doctor/pediatrician for a history and physical and/or concerning medications. Bring any test results/reports from your physicians office. If you are under the care of a heart doctor or specialist doctor, please be aware that you may be asked to them for clearance    You may be asked to stop blood thinners such as Coumadin, Plavix, Fragmin, Lovenox, etc., or any anti-inflammatories such as:  Aspirin, Ibuprofen, Advil, Naproxen prior to your surgery. We also ask that you stop any OTC medications such as fish oil, vitamin E, glucosamine, garlic, Multivitamins, COQ 10, etc.    We ask that you do not smoke 24 hours prior to surgery  We ask that you do not  drink any alcoholic beverages 24 hours prior to surgery     You must make arrangements for a responsible adult to take you home after your surgery. For your safety you will not be allowed to leave alone or drive yourself home. Your surgery will be cancelled if you do not have a ride home. Also for your safety, it is strongly suggested that someone stay with you the first 24 hours after your surgery. A parent or legal guardian must accompany a child scheduled for surgery and plan to stay at the hospital until the child is discharged. Please do not bring other children with you. For your comfort, please wear simple loose fitting clothing to the hospital.  Please do not bring valuables.     Do not wear any make-up or nail polish on your fingers or toes      For your safety, please do not wear any jewelry or body piercing's on the day of surgery. All jewelry must be removed. If you have dentures, they will be removed before going to operating room. For your convenience, we will provide you with a container. If you wear contact lenses or glasses, they will be removed, please bring a case for them. If you have a living will and a durable power of  for healthcare, please bring in a copy. As part of our patient safety program to minimize surgical site infections, we ask you to do the following:    · Please notify your surgeon if you develop any illness between         now and the  day of your surgery. · This includes a cough, cold, fever, sore throat, nausea,         or vomiting, and diarrhea, etc.  ·  Please notify your surgeon if you experience dizziness, shortness         of breath or blurred vision between now and the time of your surgery. Do not shave your operative site 96 hours prior to surgery. For face and neck surgery, men may use an electric razor 48 hours   prior to surgery. You may shower the night before surgery or the morning of   your surgery with an antibacterial soap. You will need to bring a photo ID and insurance card    [facility] has an onsite pharmacy, would you like to utilize our pharmacy     If you will be staying overnight and use a C-pap machine, please bring   your C-pap to hospital     Our goal is to provide you with excellent care, therefore, visitors will be limited to two(2) in the room at a time so that we may focus on providing this care for you. Please contact pre-admission testing if you have any further questions. [facility] phone number:  [phone] PAT fax number:  [phone]  Please note these are generalized instructions for all surgical cases, you may be provided with more specific instructions according to your surgery.   Preoperative Screening for Elective

## 2020-07-31 ENCOUNTER — ANESTHESIA EVENT (OUTPATIENT)
Dept: OPERATING ROOM | Age: 55
End: 2020-07-31
Payer: MEDICAID

## 2020-08-01 LAB — SARS-COV-2, NAA: NOT DETECTED

## 2020-08-03 ENCOUNTER — ANESTHESIA (OUTPATIENT)
Dept: OPERATING ROOM | Age: 55
End: 2020-08-03
Payer: MEDICAID

## 2020-08-03 ENCOUNTER — HOSPITAL ENCOUNTER (OUTPATIENT)
Age: 55
Setting detail: OUTPATIENT SURGERY
Discharge: HOME | End: 2020-08-03
Attending: SURGERY | Admitting: SURGERY
Payer: MEDICAID

## 2020-08-03 VITALS
BODY MASS INDEX: 36.98 KG/M2 | HEIGHT: 68 IN | OXYGEN SATURATION: 100 % | DIASTOLIC BLOOD PRESSURE: 79 MMHG | RESPIRATION RATE: 18 BRPM | HEART RATE: 78 BPM | TEMPERATURE: 97.4 F | SYSTOLIC BLOOD PRESSURE: 135 MMHG | WEIGHT: 244 LBS

## 2020-08-03 VITALS
RESPIRATION RATE: 1 BRPM | OXYGEN SATURATION: 92 % | DIASTOLIC BLOOD PRESSURE: 53 MMHG | SYSTOLIC BLOOD PRESSURE: 97 MMHG

## 2020-08-03 PROCEDURE — 6360000002 HC RX W HCPCS: Performed by: SURGERY

## 2020-08-03 PROCEDURE — 3600000002 HC SURGERY LEVEL 2 BASE: Performed by: SURGERY

## 2020-08-03 PROCEDURE — 2580000003 HC RX 258: Performed by: ANESTHESIOLOGY

## 2020-08-03 PROCEDURE — 7100000010 HC PHASE II RECOVERY - FIRST 15 MIN: Performed by: SURGERY

## 2020-08-03 PROCEDURE — 2500000003 HC RX 250 WO HCPCS: Performed by: NURSE ANESTHETIST, CERTIFIED REGISTERED

## 2020-08-03 PROCEDURE — 7100000001 HC PACU RECOVERY - ADDTL 15 MIN: Performed by: SURGERY

## 2020-08-03 PROCEDURE — 2500000003 HC RX 250 WO HCPCS: Performed by: SURGERY

## 2020-08-03 PROCEDURE — 2580000003 HC RX 258: Performed by: SURGERY

## 2020-08-03 PROCEDURE — 7100000000 HC PACU RECOVERY - FIRST 15 MIN: Performed by: SURGERY

## 2020-08-03 PROCEDURE — 3700000000 HC ANESTHESIA ATTENDED CARE: Performed by: SURGERY

## 2020-08-03 PROCEDURE — 88304 TISSUE EXAM BY PATHOLOGIST: CPT

## 2020-08-03 PROCEDURE — 3600000012 HC SURGERY LEVEL 2 ADDTL 15MIN: Performed by: SURGERY

## 2020-08-03 PROCEDURE — 2709999900 HC NON-CHARGEABLE SUPPLY: Performed by: SURGERY

## 2020-08-03 PROCEDURE — 6360000002 HC RX W HCPCS: Performed by: NURSE ANESTHETIST, CERTIFIED REGISTERED

## 2020-08-03 PROCEDURE — 7100000011 HC PHASE II RECOVERY - ADDTL 15 MIN: Performed by: SURGERY

## 2020-08-03 PROCEDURE — 3700000001 HC ADD 15 MINUTES (ANESTHESIA): Performed by: SURGERY

## 2020-08-03 PROCEDURE — 11422 EXC H-F-NK-SP B9+MARG 1.1-2: CPT | Performed by: SURGERY

## 2020-08-03 RX ORDER — FENTANYL CITRATE 50 UG/ML
50 INJECTION, SOLUTION INTRAMUSCULAR; INTRAVENOUS EVERY 5 MIN PRN
Status: DISCONTINUED | OUTPATIENT
Start: 2020-08-03 | End: 2020-08-03 | Stop reason: HOSPADM

## 2020-08-03 RX ORDER — SODIUM CHLORIDE 0.9 % (FLUSH) 0.9 %
10 SYRINGE (ML) INJECTION PRN
Status: DISCONTINUED | OUTPATIENT
Start: 2020-08-03 | End: 2020-08-03 | Stop reason: HOSPADM

## 2020-08-03 RX ORDER — MEPERIDINE HYDROCHLORIDE 25 MG/ML
12.5 INJECTION INTRAMUSCULAR; INTRAVENOUS; SUBCUTANEOUS EVERY 5 MIN PRN
Status: DISCONTINUED | OUTPATIENT
Start: 2020-08-03 | End: 2020-08-03 | Stop reason: HOSPADM

## 2020-08-03 RX ORDER — PROPOFOL 10 MG/ML
INJECTION, EMULSION INTRAVENOUS CONTINUOUS PRN
Status: DISCONTINUED | OUTPATIENT
Start: 2020-08-03 | End: 2020-08-03 | Stop reason: SDUPTHER

## 2020-08-03 RX ORDER — MIDAZOLAM HYDROCHLORIDE 1 MG/ML
INJECTION INTRAMUSCULAR; INTRAVENOUS PRN
Status: DISCONTINUED | OUTPATIENT
Start: 2020-08-03 | End: 2020-08-03 | Stop reason: SDUPTHER

## 2020-08-03 RX ORDER — LIDOCAINE HYDROCHLORIDE AND EPINEPHRINE 10; 10 MG/ML; UG/ML
INJECTION, SOLUTION INFILTRATION; PERINEURAL
Status: COMPLETED | OUTPATIENT
Start: 2020-08-03 | End: 2020-08-03

## 2020-08-03 RX ORDER — OXYCODONE HYDROCHLORIDE 10 MG/1
10 TABLET ORAL PRN
Status: DISCONTINUED | OUTPATIENT
Start: 2020-08-03 | End: 2020-08-03 | Stop reason: HOSPADM

## 2020-08-03 RX ORDER — MORPHINE SULFATE 2 MG/ML
1 INJECTION, SOLUTION INTRAMUSCULAR; INTRAVENOUS EVERY 5 MIN PRN
Status: DISCONTINUED | OUTPATIENT
Start: 2020-08-03 | End: 2020-08-03 | Stop reason: HOSPADM

## 2020-08-03 RX ORDER — MAGNESIUM HYDROXIDE 1200 MG/15ML
LIQUID ORAL CONTINUOUS PRN
Status: COMPLETED | OUTPATIENT
Start: 2020-08-03 | End: 2020-08-03

## 2020-08-03 RX ORDER — OXYCODONE HYDROCHLORIDE 5 MG/1
5 TABLET ORAL PRN
Status: DISCONTINUED | OUTPATIENT
Start: 2020-08-03 | End: 2020-08-03 | Stop reason: HOSPADM

## 2020-08-03 RX ORDER — PROPOFOL 10 MG/ML
INJECTION, EMULSION INTRAVENOUS PRN
Status: DISCONTINUED | OUTPATIENT
Start: 2020-08-03 | End: 2020-08-03 | Stop reason: SDUPTHER

## 2020-08-03 RX ORDER — FENTANYL CITRATE 50 UG/ML
25 INJECTION, SOLUTION INTRAMUSCULAR; INTRAVENOUS EVERY 5 MIN PRN
Status: DISCONTINUED | OUTPATIENT
Start: 2020-08-03 | End: 2020-08-03 | Stop reason: HOSPADM

## 2020-08-03 RX ORDER — SODIUM CHLORIDE 0.9 % (FLUSH) 0.9 %
10 SYRINGE (ML) INJECTION EVERY 12 HOURS SCHEDULED
Status: DISCONTINUED | OUTPATIENT
Start: 2020-08-03 | End: 2020-08-03 | Stop reason: HOSPADM

## 2020-08-03 RX ORDER — MORPHINE SULFATE 2 MG/ML
2 INJECTION, SOLUTION INTRAMUSCULAR; INTRAVENOUS EVERY 5 MIN PRN
Status: DISCONTINUED | OUTPATIENT
Start: 2020-08-03 | End: 2020-08-03 | Stop reason: HOSPADM

## 2020-08-03 RX ORDER — GLYCOPYRROLATE 0.2 MG/ML
INJECTION INTRAMUSCULAR; INTRAVENOUS PRN
Status: DISCONTINUED | OUTPATIENT
Start: 2020-08-03 | End: 2020-08-03 | Stop reason: SDUPTHER

## 2020-08-03 RX ORDER — TRAMADOL HYDROCHLORIDE 50 MG/1
50 TABLET ORAL EVERY 6 HOURS PRN
Qty: 12 TABLET | Refills: 0 | Status: SHIPPED | OUTPATIENT
Start: 2020-08-03 | End: 2020-08-06

## 2020-08-03 RX ORDER — ONDANSETRON 2 MG/ML
4 INJECTION INTRAMUSCULAR; INTRAVENOUS
Status: DISCONTINUED | OUTPATIENT
Start: 2020-08-03 | End: 2020-08-03 | Stop reason: HOSPADM

## 2020-08-03 RX ORDER — LIDOCAINE HYDROCHLORIDE 20 MG/ML
INJECTION, SOLUTION EPIDURAL; INFILTRATION; INTRACAUDAL; PERINEURAL PRN
Status: DISCONTINUED | OUTPATIENT
Start: 2020-08-03 | End: 2020-08-03 | Stop reason: SDUPTHER

## 2020-08-03 RX ORDER — SODIUM CHLORIDE 9 MG/ML
INJECTION, SOLUTION INTRAVENOUS CONTINUOUS
Status: DISCONTINUED | OUTPATIENT
Start: 2020-08-03 | End: 2020-08-03 | Stop reason: HOSPADM

## 2020-08-03 RX ADMIN — SODIUM CHLORIDE: 9 INJECTION, SOLUTION INTRAVENOUS at 07:25

## 2020-08-03 RX ADMIN — LIDOCAINE HYDROCHLORIDE 5 ML: 20 INJECTION, SOLUTION EPIDURAL; INFILTRATION; INTRACAUDAL; PERINEURAL at 07:30

## 2020-08-03 RX ADMIN — PROPOFOL 120 MCG/KG/MIN: 10 INJECTION, EMULSION INTRAVENOUS at 07:30

## 2020-08-03 RX ADMIN — MIDAZOLAM 1 MG: 1 INJECTION INTRAMUSCULAR; INTRAVENOUS at 07:25

## 2020-08-03 RX ADMIN — GLYCOPYRROLATE 0.1 MG: 0.2 INJECTION, SOLUTION INTRAMUSCULAR; INTRAVENOUS at 07:25

## 2020-08-03 RX ADMIN — PROPOFOL 50 MG: 10 INJECTION, EMULSION INTRAVENOUS at 07:30

## 2020-08-03 RX ADMIN — MIDAZOLAM 1 MG: 1 INJECTION INTRAMUSCULAR; INTRAVENOUS at 07:30

## 2020-08-03 RX ADMIN — CEFAZOLIN 2 G: 10 INJECTION, POWDER, FOR SOLUTION INTRAVENOUS at 07:25

## 2020-08-03 ASSESSMENT — PAIN SCALES - GENERAL
PAINLEVEL_OUTOF10: 0

## 2020-08-03 ASSESSMENT — PULMONARY FUNCTION TESTS
PIF_VALUE: 1
PIF_VALUE: 0
PIF_VALUE: 1
PIF_VALUE: 0
PIF_VALUE: 1
PIF_VALUE: 0
PIF_VALUE: 1

## 2020-08-03 ASSESSMENT — PAIN - FUNCTIONAL ASSESSMENT: PAIN_FUNCTIONAL_ASSESSMENT: 0-10

## 2020-08-03 NOTE — BRIEF OP NOTE
Brief Postoperative Note      Patient: Raymond Perrin  YOB: 1965  MRN: [id number]    Date of Procedure: 8/3/2020    Pre-Op Diagnosis: SEBACEOUS CYST    Post-Op Diagnosis: Same       Procedure(s):  REMOVAL OF SEBACEOUS CYST: INFERIOR POSTERIOR NECK    Surgeon(s):  Kamilla Frias MD    Assistant:  Surgical Assistant: Marye Nissen; Cathey Chard    Anesthesia: Monitor Anesthesia Care    Estimated Blood Loss (mL): Minimal    Complications: None    Specimens:   ID Type Source Tests Collected by Time Destination   A : a) posterior neck sebaceous cyst Specimen Neck SURGICAL PATHOLOGY Kamilla Frias MD 8/3/2020 4932        Implants:  * No implants in log *      Drains: * No LDAs found *    Findings: 4.5 cm x 2 cm    Electronically signed by Kamilla Frias MD on 8/3/2020 at 7:48 AM

## 2020-08-03 NOTE — H&P
Update History & Physical    The patient's History and Physical of July 29, 2020 was reviewed with the patient and I examined the patient. There was no change. The surgical site was confirmed by the patient and me. Upper back cyst marked. Plan excision      Plan: The risks, benefits, expected outcome, and alternative to the recommended procedure have been discussed with the patient. Patient understands and wants to proceed with the procedure.      Electronically signed by Susan Coleman MD on 8/3/2020 at 7:15 AM

## 2020-08-03 NOTE — ANESTHESIA POSTPROCEDURE EVALUATION
Department of Anesthesiology  Postprocedure Note    Patient: Ruben Rangel  MRN: [id number]  YOB: 1965  Date of evaluation: 8/3/2020  Time:  1:27 PM     Procedure Summary     Date:  08/03/20 Room / Location:  [facility]  / [facility]    Anesthesia Start:  0725 Anesthesia Stop:  0800    Procedure:  REMOVAL OF SEBACEOUS CYST INFERIOR POSTERIOR NECK (N/A Neck) Diagnosis:       Sebaceous cyst      (SEBACEOUS CYST)    Surgeon:  Lovely Piña MD Responsible Provider:  Uma Diaz MD    Anesthesia Type:  MAC ASA Status:  3          Anesthesia Type: MAC    Kendy Phase I: Kendy Score: 10    Kendy Phase II: Kendy Score: 10    Last vitals: Reviewed and per EMR flowsheets.        Anesthesia Post Evaluation    Patient location during evaluation: PACU  Patient participation: complete - patient participated  Level of consciousness: awake and alert  Pain score: 0  Airway patency: patent  Nausea & Vomiting: no nausea and no vomiting  Complications: no  Cardiovascular status: blood pressure returned to baseline  Respiratory status: acceptable  Hydration status: euvolemic

## 2020-08-03 NOTE — OP NOTE
Hemostasis was  achieved with cautery and then the wound closed with a layered Vicryl  and Dermabond was applied. The patient tolerated the procedure well. He was taken to the PACU in stable condition. All counts were correct at the end of the case.         Ml Norman MD    D: 08/03/2020 10:14:56       T: 08/03/2020 11:25:22     [email or web address]  Job#: [id number]     Doc#: [id number]    CC:  Marek Angela CNP

## 2020-08-03 NOTE — ANESTHESIA PRE PROCEDURE
POC Tests: No results for input(s): POCGLU, POCNA, POCK, POCCL, POCBUN, POCHEMO, POCHCT in the last 72 hours. Coags:   Lab Results   Component Value Date    PROTIME 10.5 09/24/2019    INR 0.92 09/24/2019       HCG (If Applicable): No results found for: PREGTESTUR, PREGSERUM, HCG, HCGQUANT     ABGs: No results found for: PHART, PO2ART, ELY0HHP, MCT8XSE, BEART, G6MIITUU     Type & Screen (If Applicable):  No results found for: LABABO, LABRH    Drug/Infectious Status (If Applicable):  No results found for: HIV, HEPCAB    COVID-19 Screening (If Applicable):   Lab Results   Component Value Date    COVID19 NOT DETECTED 07/29/2020         Anesthesia Evaluation  Patient summary reviewed and Nursing notes reviewed no history of anesthetic complications:   Airway: Mallampati: II  TM distance: >3 FB   Neck ROM: full  Mouth opening: > = 3 FB Dental:    (+) edentulous      Pulmonary:Negative Pulmonary ROS breath sounds clear to auscultation                             Cardiovascular:  Exercise tolerance: good (>4 METS),   (+) hypertension:, hyperlipidemia    (-) pacemaker, valvular problems/murmurs, past MI, CAD, CABG/stent, dysrhythmias,  angina,  CHF, orthopnea, PND and  HARRIS      Rhythm: regular                      Neuro/Psych:   (+) neuromuscular disease:,    (-) seizures, TIA, CVA, headaches, psychiatric history and depression/anxiety            GI/Hepatic/Renal: Neg GI/Hepatic/Renal ROS           ROS comment: BMI 37 overweight. Endo/Other: Negative Endo/Other ROS                    Abdominal:           Vascular: negative vascular ROS. Anesthesia Plan      MAC     ASA 3       Induction: intravenous. MIPS: Prophylactic antiemetics administered. Anesthetic plan and risks discussed with patient. Plan discussed with CRNA.                   Marguerite King MD   8/3/2020      This pre-anesthesia assessment may be used as a history and physical.    DOS STAFF ADDENDUM:    Pt seen and examined, chart reviewed (including anesthesia, drug and allergy history). No interval changes to history and physical examination. Anesthetic plan, risks, benefits, alternatives, and personnel involved discussed with patient. Patient verbalized an understanding and agrees to proceed.       Lata Mcclain MD  August 3, 2020  7:07 AM

## 2020-08-14 ENCOUNTER — OFFICE VISIT (OUTPATIENT)
Dept: FAMILY MEDICINE CLINIC | Age: 55
End: 2020-08-14
Payer: MEDICAID

## 2020-08-14 VITALS
SYSTOLIC BLOOD PRESSURE: 128 MMHG | TEMPERATURE: 97.5 F | DIASTOLIC BLOOD PRESSURE: 66 MMHG | BODY MASS INDEX: 36.19 KG/M2 | RESPIRATION RATE: 10 BRPM | OXYGEN SATURATION: 99 % | WEIGHT: 238 LBS | HEART RATE: 65 BPM

## 2020-08-14 PROCEDURE — 99214 OFFICE O/P EST MOD 30 MIN: CPT | Performed by: NURSE PRACTITIONER

## 2020-08-14 PROCEDURE — G8427 DOCREV CUR MEDS BY ELIG CLIN: HCPCS | Performed by: NURSE PRACTITIONER

## 2020-08-14 PROCEDURE — 1036F TOBACCO NON-USER: CPT | Performed by: NURSE PRACTITIONER

## 2020-08-14 PROCEDURE — G8417 CALC BMI ABV UP PARAM F/U: HCPCS | Performed by: NURSE PRACTITIONER

## 2020-08-14 PROCEDURE — 3017F COLORECTAL CA SCREEN DOC REV: CPT | Performed by: NURSE PRACTITIONER

## 2020-08-14 RX ORDER — AMLODIPINE BESYLATE 5 MG/1
TABLET ORAL
Qty: 90 TABLET | Refills: 1 | Status: SHIPPED | OUTPATIENT
Start: 2020-08-14 | End: 2021-02-23

## 2020-08-14 ASSESSMENT — ENCOUNTER SYMPTOMS
NAUSEA: 0
VOMITING: 0
DIARRHEA: 0
CONSTIPATION: 0
SHORTNESS OF BREATH: 0
BACK PAIN: 1
COUGH: 0
CHEST TIGHTNESS: 0

## 2020-08-14 NOTE — PROGRESS NOTES
8/14/2020    This is a 60 y.o. male   Chief Complaint   Patient presents with    Hypertension   . HPI    Hypertension:  Home blood pressure monitoring: Yes - does not remember the readings. He is not adherent to a low sodium diet. Patient denies chest pain, shortness of breath, headache, peripheral edema and palpitations. Antihypertensive medication side effects: no medication side effects noted. Sodium (mmol/L)   Date Value   07/24/2020 141    BUN (mg/dL)   Date Value   07/24/2020 9    Glucose (mg/dL)   Date Value   09/24/2019 94      Potassium (mmol/L)   Date Value   07/24/2020 3.7    CREATININE (mg/dL)   Date Value   07/24/2020 1.0         Hyperlipidemia:  No new myalgias or GI upset on atorvastatin (Lipitor). Medication compliance: compliant most of the time. Patient is not following a low fat, low cholesterol diet. He is not exercising regularly. Lab Results   Component Value Date    HDL 45 07/24/2020    LDLCALC 211 (H) 07/24/2020     Lab Results   Component Value Date    ALT 32 07/24/2020    AST 28 07/24/2020        Obesity- no routine diet or exercise. Continues to follow with pain management for chronic pain in right foot and low back pain. Not currently on any pain therapy. Reports that he was on a pain patch, but that was causing him arm pain were the patch was located. Working with [facility] on possible lumbar injection. Had sebaceous cyst removed with Dr. Blaise Tay. He reports that he is healing well.      Patient Active Problem List   Diagnosis    BWC Fracture of phalanx of right foot, open    BWC Open fracture of phalanx of lesser toe of right foot    BWC Sprain of right foot    BWC Crushing injury of right foot    BWC Contusion of right foot    Complex regional pain syndrome type 1 of right lower extremity    Essential hypertension    Obesity, Class II, BMI 35-39.9    Hypercholesterolemia    Sebaceous cyst          Current Outpatient Medications   Medication Sig Dispense Refill    amLODIPine (NORVASC) 5 MG tablet TAKE 1 TABLET BY MOUTH EVERY DAY 30 tablet 0    atorvastatin (LIPITOR) 40 MG tablet Take 1 tablet by mouth daily 90 tablet 1    Blood Pressure Monitoring (BLOOD PRESSURE CUFF) MISC Use to check BP daily (Patient not taking: Reported on 8/14/2020) 1 each 0     No current facility-administered medications for this visit. Allergies   Allergen Reactions    Lyrica [Pregabalin] Hives    Cymbalta [Duloxetine Hcl] Itching and Rash       Review of Systems   Constitutional: Negative for activity change and fever. Respiratory: Negative for cough, chest tightness and shortness of breath. Cardiovascular: Negative for chest pain, palpitations and leg swelling. Gastrointestinal: Negative for constipation, diarrhea, nausea and vomiting. Musculoskeletal: Positive for arthralgias, back pain and myalgias. Neurological: Negative for dizziness, syncope, weakness and headaches. Psychiatric/Behavioral: Negative for confusion. Vitals:    08/14/20 0756   BP: 128/66   Site: Right Upper Arm   Position: Sitting   Cuff Size: Large Adult   Pulse: 65   Resp: 10   Temp: 97.5 °F (36.4 °C)   SpO2: 99%   Weight: 238 lb (108 kg)       Body mass index is 36.19 kg/m². Wt Readings from Last 3 Encounters:   08/14/20 238 lb (108 kg)   08/03/20 244 lb (110.7 kg)   07/29/20 244 lb (110.7 kg)       BP Readings from Last 3 Encounters:   08/14/20 128/66   08/03/20 (!) 97/53   08/03/20 135/79       Physical Exam  Vitals signs and nursing note reviewed. Constitutional:       General: He is not in acute distress. Appearance: He is well-developed. HENT:      Head: Normocephalic and atraumatic. Neck:      Musculoskeletal: Neck supple. Cardiovascular:      Rate and Rhythm: Normal rate and regular rhythm. Heart sounds: Normal heart sounds. No murmur. No friction rub. No gallop.     Pulmonary:      Effort: Pulmonary effort is normal. No respiratory distress. Breath sounds: Normal breath sounds. Musculoskeletal:      Right lower leg: No edema. Left lower leg: No edema. Skin:     General: Skin is warm and dry. Neurological:      Mental Status: He is alert and oriented to person, place, and time. Psychiatric:         Behavior: Behavior normal.         Thought Content: Thought content normal.         Judgment: Judgment normal.         Assessmentand Plan  Gabby Beauchamp was seen today for hypertension. Diagnoses and all orders for this visit:    Essential hypertension: controlled   -     amLODIPine (NORVASC) 5 MG tablet; TAKE 1 TABLET BY MOUTH EVERY DAY  Advised to work on aerobic exercise and weight loss. Hypercholesterolemia  Started on atorvastatin after last visit. Advised to work on diet, aerobic exercise, and weight loss. Obesity, Class II, BMI 35-39.9  Diet, aerobic exercise, and weight loss discussed and needed. Return in about 7 months (around 3/14/2021), or if symptoms worsen or fail to improve, for blood pressure, cholesterol.

## 2020-08-19 ENCOUNTER — OFFICE VISIT (OUTPATIENT)
Dept: SURGERY | Age: 55
End: 2020-08-19

## 2020-08-19 PROCEDURE — 99024 POSTOP FOLLOW-UP VISIT: CPT | Performed by: SURGERY

## 2020-09-29 ENCOUNTER — OFFICE VISIT (OUTPATIENT)
Dept: FAMILY MEDICINE CLINIC | Age: 55
End: 2020-09-29
Payer: MEDICAID

## 2020-09-29 VITALS
TEMPERATURE: 97.5 F | OXYGEN SATURATION: 95 % | BODY MASS INDEX: 36.53 KG/M2 | HEART RATE: 63 BPM | RESPIRATION RATE: 16 BRPM | WEIGHT: 241 LBS | SYSTOLIC BLOOD PRESSURE: 132 MMHG | HEIGHT: 68 IN | DIASTOLIC BLOOD PRESSURE: 76 MMHG

## 2020-09-29 PROCEDURE — 99213 OFFICE O/P EST LOW 20 MIN: CPT | Performed by: NURSE PRACTITIONER

## 2020-09-29 PROCEDURE — 3017F COLORECTAL CA SCREEN DOC REV: CPT | Performed by: NURSE PRACTITIONER

## 2020-09-29 PROCEDURE — G8427 DOCREV CUR MEDS BY ELIG CLIN: HCPCS | Performed by: NURSE PRACTITIONER

## 2020-09-29 PROCEDURE — 1036F TOBACCO NON-USER: CPT | Performed by: NURSE PRACTITIONER

## 2020-09-29 PROCEDURE — G8417 CALC BMI ABV UP PARAM F/U: HCPCS | Performed by: NURSE PRACTITIONER

## 2020-09-29 RX ORDER — DICLOFENAC SODIUM 75 MG/1
75 TABLET, DELAYED RELEASE ORAL 2 TIMES DAILY
Qty: 60 TABLET | Refills: 0 | Status: SHIPPED | OUTPATIENT
Start: 2020-09-29 | End: 2020-12-01

## 2020-09-29 ASSESSMENT — ENCOUNTER SYMPTOMS
SHORTNESS OF BREATH: 0
BACK PAIN: 1
COLOR CHANGE: 0
COUGH: 0

## 2020-09-29 NOTE — PROGRESS NOTES
9/29/2020    This is a 60 y.o. male   Chief Complaint   Patient presents with    Knee Pain     x2 years worse the last 2 weeks. bi-lateral.    . HPI  Patient presents today with bilateral knee pain that has been present for 'for the past few years.'  Patient reports that he had surgery on left knee many years ago and relied heavily on his right knee. Pain in both knees has progressively gotten worse. Patient has tried ibuprofen, tylenol, multiple topical creams. He states that he has tried heat and elevation. He states he has not applied ice. He has had no recent trauma to his knees. He states it is difficult to walk, especially up a flight of stairs. He denies use of assistive devices. Follows with Dr. July Almendarez for his chronic right foot and ankle pain. Patient Active Problem List   Diagnosis    BWC Fracture of phalanx of right foot, open    BWC Open fracture of phalanx of lesser toe of right foot    BWC Sprain of right foot    BWC Crushing injury of right foot    BWC Contusion of right foot    Complex regional pain syndrome type 1 of right lower extremity    Essential hypertension    Obesity, Class II, BMI 35-39.9    Hypercholesterolemia    Sebaceous cyst          Current Outpatient Medications   Medication Sig Dispense Refill    amLODIPine (NORVASC) 5 MG tablet TAKE 1 TABLET BY MOUTH EVERY DAY 90 tablet 1    atorvastatin (LIPITOR) 40 MG tablet Take 1 tablet by mouth daily 90 tablet 1    Blood Pressure Monitoring (BLOOD PRESSURE CUFF) MISC Use to check BP daily 1 each 0     No current facility-administered medications for this visit. Allergies   Allergen Reactions    Lyrica [Pregabalin] Hives    Cymbalta [Duloxetine Hcl] Itching and Rash       Review of Systems   Constitutional: Positive for activity change. Negative for fever. Respiratory: Negative for cough and shortness of breath. Cardiovascular: Negative for chest pain, palpitations and leg swelling.    Musculoskeletal: times daily. Should take medication with food. -     Arina Kincaid MD, Orthopedic Surgery, [facility]  Recommended icing knee 20 minutes multiple times/day to help with swelling  Dicolfenac 75 mg twice daily for pain and inflammation. Patient declined referral to physical therapy. Referred to Dr. Zoila Sahu- orthopedics for further evaluation and treatment. Advised to get flu shot this fall      Return if symptoms worsen or fail to improve. Patient seen along with NP student, Duran Holden.

## 2020-11-18 ENCOUNTER — OFFICE VISIT (OUTPATIENT)
Dept: ORTHOPEDIC SURGERY | Age: 55
End: 2020-11-18
Payer: COMMERCIAL

## 2020-11-18 VITALS — BODY MASS INDEX: 36.53 KG/M2 | WEIGHT: 241 LBS | TEMPERATURE: 97.2 F | HEIGHT: 68 IN

## 2020-11-18 PROCEDURE — 99213 OFFICE O/P EST LOW 20 MIN: CPT | Performed by: ORTHOPAEDIC SURGERY

## 2020-11-18 NOTE — PROGRESS NOTES
CHIEF COMPLAINT:   1- Right foot pain/ foot contusion  2- Right foot chronic pain/ possible RSD. 3- Right hallux rigidus. DATE OF INJURY:  11/2013, Worker's Comp    HISTORY:  Mr. Nigel Ohara 60 y.o.  [place]  male  presents today for f/u and still c/o pain right foot that is worsening. He was seen on 4/12/2019 for a 2nd opinion and evaluation of a right foot injury which occurred when he was at work and a fork lift ran over his foot. He is complaining of foot pain 10/10 and sensitivity to touch and constant. This is better with elevation and worse with bearing wt or when anything touches his foot and is moving up to his ankle. The pain is sharp and radiating with numbness and tingling sensation. No other complaint. He was seen by Dr Dallin Bowman, and now in pain management with Dr Maren Gilliam. He recently saw someone at [facility] and was given Lyrica with no relief. He says he has an aversion to needles and does not want any injections. He was seen at [facility] for pain, but not happy and would like to see a different pain management.      Past Medical History:   Diagnosis Date    HTN (hypertension)     Hyperlipidemia        Past Surgical History:   Procedure Laterality Date    DENTAL SURGERY      FACIAL SURGERY  2005    KNEE SURGERY Left 2010    NECK SURGERY N/A 8/3/2020    REMOVAL OF SEBACEOUS CYST INFERIOR POSTERIOR NECK performed by Marii Perez MD at [address] [place] [address] History     Socioeconomic History    Marital status: Single     Spouse name: Not on file    Number of children: 1    Years of education: Not on file    Highest education level: Not on file   Occupational History    Not on file   Social Needs    Financial resource strain: Not on file    Food insecurity     Worry: Not on file     Inability: Not on file    Transportation needs     Medical: Not on file     Non-medical: Not on file   Tobacco Use    Smoking status: Never Smoker    Smokeless tobacco: Never Used   Substance and Sexual Activity    Alcohol use: Yes     Frequency: 2-4 times a month     Drinks per session: 1 or 2     Comment: couple of drinks per month     Drug use: Never    Sexual activity: Yes     Partners: Female   Lifestyle    Physical activity     Days per week: Not on file     Minutes per session: Not on file    Stress: Not on file   Relationships    Social connections     Talks on phone: Not on file     Gets together: Not on file     Attends Sabianist service: Not on file     Active member of club or organization: Not on file     Attends meetings of clubs or organizations: Not on file     Relationship status: Not on file    Intimate partner violence     Fear of current or ex partner: Not on file     Emotionally abused: Not on file     Physically abused: Not on file     Forced sexual activity: Not on file   Other Topics Concern    Not on file   Social History Narrative    Not on file       Family History   Problem Relation Age of Onset    No Known Problems Mother     No Known Problems Father        Current Outpatient Medications on File Prior to Visit   Medication Sig Dispense Refill    diclofenac (VOLTAREN) 75 MG EC tablet Take 1 tablet by mouth 2 times daily 60 tablet 0    amLODIPine (NORVASC) 5 MG tablet TAKE 1 TABLET BY MOUTH EVERY DAY 90 tablet 1    atorvastatin (LIPITOR) 40 MG tablet Take 1 tablet by mouth daily 90 tablet 1    Blood Pressure Monitoring (BLOOD PRESSURE CUFF) MISC Use to check BP daily 1 each 0     No current facility-administered medications on file prior to visit. Pertinent items are noted in HPI  Review of systems reviewed from Patient History Form dated on 4/12/2019 and available in the patient's chart under the Media tab. No change noted. PHYSICAL EXAMINATION:  Mr. Kal Viera is a very pleasant 60 y.o.  male who presents today in no acute distress, awake, alert, and oriented. He is well dressed, nourished and  groomed. Patient with normal affect. Height is  5' 8\" (1.727 m), weight is 241 lb (109.3 kg), Body mass index is 36.64 kg/m². Resting respiratory rate is 16. Examination of the gait, showed that the patient walks with no limp, WB right leg . Examination of both ankles showing a decreased range of motion of the right ankle compare to the other side because of foot pain. There is no swelling that can be seen, no ecchymosis of the right foot. He  has intact sensation and good pedal pulses. He has significant tenderness on deep and light palpation over the entire right foot        IMAGING: Xray's were reviewed, taken 12/11/2019 in the office, 3 views of the right  foot, and showed no displaced fracture. Right hallux rigidus. MRI right foot dated 12/27/2019 was reviewed and showed   No acute fracture or significant bone marrow edema.       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 IMPRESSION:    1- Right foot pain/ foot contusion  2- Right foot chronic pain/ possible RSD. 3- Right hallux rigidus. PLAN:  I assured the patient that the MRI and xray is negative for acute fracture or other pathology except great toe DJD. We discussed the risk of RSD given his generalized pain and sensitivity to touch. Since he is continuing to have significant symptoms with normal MRI, we recommend continue pain management, referral given to Dr. Archie Hernandez per his request. F/U PRN.       Aranza De Leon MD

## 2020-11-19 ENCOUNTER — TELEPHONE (OUTPATIENT)
Dept: ORTHOPEDIC SURGERY | Age: 55
End: 2020-11-19

## 2020-11-19 NOTE — TELEPHONE ENCOUNTER
[facility]ED [facility] / [address] ( [place] ) [facility]

## 2020-11-24 ENCOUNTER — NURSE TRIAGE (OUTPATIENT)
Dept: OTHER | Facility: CLINIC | Age: 55
End: 2020-11-24

## 2020-11-24 ENCOUNTER — OFFICE VISIT (OUTPATIENT)
Dept: FAMILY MEDICINE CLINIC | Age: 55
End: 2020-11-24
Payer: MEDICAID

## 2020-11-24 VITALS
BODY MASS INDEX: 36.34 KG/M2 | HEIGHT: 68 IN | SYSTOLIC BLOOD PRESSURE: 136 MMHG | DIASTOLIC BLOOD PRESSURE: 78 MMHG | RESPIRATION RATE: 14 BRPM | WEIGHT: 239.8 LBS | HEART RATE: 73 BPM | OXYGEN SATURATION: 97 % | TEMPERATURE: 97.3 F

## 2020-11-24 PROCEDURE — G8417 CALC BMI ABV UP PARAM F/U: HCPCS | Performed by: INTERNAL MEDICINE

## 2020-11-24 PROCEDURE — G8427 DOCREV CUR MEDS BY ELIG CLIN: HCPCS | Performed by: INTERNAL MEDICINE

## 2020-11-24 PROCEDURE — 3017F COLORECTAL CA SCREEN DOC REV: CPT | Performed by: INTERNAL MEDICINE

## 2020-11-24 PROCEDURE — 99212 OFFICE O/P EST SF 10 MIN: CPT | Performed by: INTERNAL MEDICINE

## 2020-11-24 PROCEDURE — G8484 FLU IMMUNIZE NO ADMIN: HCPCS | Performed by: INTERNAL MEDICINE

## 2020-11-24 PROCEDURE — 1036F TOBACCO NON-USER: CPT | Performed by: INTERNAL MEDICINE

## 2020-11-24 ASSESSMENT — ENCOUNTER SYMPTOMS
DIARRHEA: 0
SHORTNESS OF BREATH: 0
COUGH: 0
NAUSEA: 0

## 2020-11-24 NOTE — TELEPHONE ENCOUNTER
Reason for Disposition   SEVERE pain (e.g., excruciating, unable to do any normal activities)    Answer Assessment - Initial Assessment Questions  1. ONSET: \"When did the pain start? \"       Last few weeks   2. LOCATION: \"Where is the pain located? \"      Right foot   3. PAIN: \"How bad is the pain? \"    (Scale 1-10; or mild, moderate, severe)    -  MILD (1-3): doesn't interfere with normal activities     -  MODERATE (4-7): interferes with normal activities (e.g., work or school) or awakens from sleep, limping     -  SEVERE (8-10): excruciating pain, unable to do any normal activities, unable to walk      10/10  4. WORK OR EXERCISE: \"Has there been any recent work or exercise that involved this part of the body? \"      no  5. CAUSE: \"What do you think is causing the foot pain? \"      Old injury   6. OTHER SYMPTOMS: \"Do you have any other symptoms? \" (e.g., leg pain, rash, fever, numbness)      Numbness, tingling, burning   7. PREGNANCY: \"Is there any chance you are pregnant? \" \"When was your last menstrual period? \"      no    Protocols used: FOOT PAIN-ADULT-[place]    Patient called pre-service center [facility] with red flag complaint. Brief description of triage: pt reports right foot pain 10/10, cant walk, and swelling. He notes an old injury years ago that is worsening now    Triage indicates for patient to see pcp today    Care advice provided, patient verbalizes understanding; denies any other questions or concerns; instructed to call back for any new or worsening symptoms. Writer provided warm transfer to [place] at [facility] for appointment scheduling. Attention Provider: Thank you for allowing me to participate in the care of your patient. The patient was connected to triage in response to information provided to the [facility]. Please do not respond through this encounter as the response is not directed to a shared pool.

## 2020-11-24 NOTE — PROGRESS NOTES
per session: 1 or 2     Comment: couple of drinks per month     Drug use: Never      Family History   Problem Relation Age of Onset    No Known Problems Mother     No Known Problems Father      Prior to Visit Medications    Medication Sig Taking?  Authorizing Provider   diclofenac (VOLTAREN) 75 MG EC tablet Take 1 tablet by mouth 2 times daily Yes SONALI Llanos CNP   amLODIPine (NORVASC) 5 MG tablet TAKE 1 TABLET BY MOUTH EVERY DAY Yes SONALI Llanos CNP   atorvastatin (LIPITOR) 40 MG tablet Take 1 tablet by mouth daily Yes SONALI Llanos CNP   Blood Pressure Monitoring (BLOOD PRESSURE CUFF) MISC Use to check BP daily Yes SONALI Llanos CNP     Patient Active Problem List   Diagnosis    BWC Fracture of phalanx of right foot, open    BWC Open fracture of phalanx of lesser toe of right foot    BWC Sprain of right foot    BWC Crushing injury of right foot    BWC Contusion of right foot    Complex regional pain syndrome type 1 of right lower extremity    Essential hypertension    Obesity, Class II, BMI 35-39.9    Hypercholesterolemia    Sebaceous cyst        LABS:   Lab Results   Component Value Date    GLUCOSE 94 09/24/2019     Lab Results   Component Value Date     07/24/2020    K 3.7 07/24/2020    CREATININE 1.0 07/24/2020     LDL Calculated   Date Value Ref Range Status   07/24/2020 211 (H) <100 mg/dL Final     HDL   Date Value Ref Range Status   07/24/2020 45 40 - 60 mg/dL Final   No results found for: TRIG  Lab Results   Component Value Date    ALT 32 07/24/2020    AST 28 07/24/2020    ALKPHOS 79 07/24/2020    BILITOT 0.4 07/24/2020      Lab Results   Component Value Date    WBC 8.7 09/24/2019    HGB 17.8 (H) 09/24/2019    HCT 52.6 (H) 09/24/2019    MCV 90.9 09/24/2019     09/24/2019     TSH (uIU/mL)   Date Value   11/30/2018 1.03     No results found for: LABA1C  No results found for: PSA, PSADIA     PHYSICAL EXAM:  /78   Pulse 73 Temp 97.3 °F (36.3 °C)   Resp 14   Ht 5' 8\" (1.727 m)   Wt 239 lb 12.8 oz (108.8 kg)   SpO2 97%   BMI 36.46 kg/m²    Physical Exam  Constitutional:       Appearance: Normal appearance. Comments: Smelled like marijuana in the room      Musculoskeletal:      Comments: Right foot little swollen ,  Tender to palpation  Right great nail with some white discoloration  Able to move toes     Skin:     General: Skin is warm and dry. Neurological:      Mental Status: He is alert. Psychiatric:         Behavior: Behavior normal.         Thought Content: Thought content normal.       BP Readings from Last 5 Encounters:   11/24/20 136/78   09/29/20 132/76   08/14/20 128/66   08/03/20 (!) 97/53   08/03/20 135/79       Wt Readings from Last 5 Encounters:   11/24/20 239 lb 12.8 oz (108.8 kg)   11/18/20 241 lb (109.3 kg)   09/29/20 241 lb (109.3 kg)   08/14/20 238 lb (108 kg)   08/03/20 244 lb (110.7 kg)      Sylvia Smith was seen today for edema. Diagnoses and all orders for this visit:    Right foot pain    wanted something for pain . Advised  Tylenol. The woman with him today says that he has complex regional pain syndrome  Wanted name of new pain mgm  Gave him names  Explained we do not do any workman's comp in the office.     Patient Instructions   Look up dr Christina Patricio, dr Tayla [facility], and dr Sylvie Viera CNP

## 2020-12-23 ENCOUNTER — TELEPHONE (OUTPATIENT)
Dept: ORTHOPEDIC SURGERY | Age: 55
End: 2020-12-23

## 2020-12-23 NOTE — TELEPHONE ENCOUNTER
New order will be placed and faxed along with the last office note and the patients demographics to [facility]. Patients original referral for pain management was denied by workers comp so the patient will have to use their personal insurance to proceed with pain management. I attempted to contact the phone number that was left in the encounter below. This shows that his [address] placed the phone call. Spoke with Tana Kirby, listed on patients HIPAA. She states she did not call regarding Urszula Jose and provided a contact phone number for him. Urszula Jose- [phone]    See [place] note. ..

## 2020-12-28 ENCOUNTER — HOSPITAL ENCOUNTER (EMERGENCY)
Age: 55
Discharge: HOME | End: 2020-12-28
Attending: EMERGENCY MEDICINE
Payer: COMMERCIAL

## 2020-12-28 VITALS
OXYGEN SATURATION: 99 % | RESPIRATION RATE: 14 BRPM | HEART RATE: 55 BPM | TEMPERATURE: 97.8 F | HEIGHT: 69 IN | SYSTOLIC BLOOD PRESSURE: 159 MMHG | BODY MASS INDEX: 35.78 KG/M2 | WEIGHT: 241.6 LBS | DIASTOLIC BLOOD PRESSURE: 80 MMHG

## 2020-12-28 LAB
BILIRUBIN URINE: NEGATIVE
BLOOD, URINE: ABNORMAL
CLARITY: CLEAR
COLOR: YELLOW
EPITHELIAL CELLS, UA: ABNORMAL /HPF (ref 0–5)
GLUCOSE URINE: NEGATIVE MG/DL
KETONES, URINE: NEGATIVE MG/DL
LEUKOCYTE ESTERASE, URINE: NEGATIVE
MICROSCOPIC EXAMINATION: YES
MUCUS: ABNORMAL /LPF
NITRITE, URINE: NEGATIVE
PH UA: 5.5 (ref 5–8)
PROTEIN UA: NEGATIVE MG/DL
RBC UA: ABNORMAL /HPF (ref 0–4)
SPECIFIC GRAVITY UA: >=1.03 (ref 1–1.03)
URINE REFLEX TO CULTURE: ABNORMAL
URINE TRICHOMONAS EVALUATION: NORMAL
URINE TYPE: ABNORMAL
UROBILINOGEN, URINE: 0.2 E.U./DL
WBC UA: ABNORMAL /HPF (ref 0–5)

## 2020-12-28 PROCEDURE — 6360000002 HC RX W HCPCS: Performed by: EMERGENCY MEDICINE

## 2020-12-28 PROCEDURE — 99283 EMERGENCY DEPT VISIT LOW MDM: CPT

## 2020-12-28 PROCEDURE — 96372 THER/PROPH/DIAG INJ SC/IM: CPT

## 2020-12-28 PROCEDURE — 6370000000 HC RX 637 (ALT 250 FOR IP): Performed by: EMERGENCY MEDICINE

## 2020-12-28 PROCEDURE — 87591 N.GONORRHOEAE DNA AMP PROB: CPT

## 2020-12-28 PROCEDURE — 87491 CHLMYD TRACH DNA AMP PROBE: CPT

## 2020-12-28 PROCEDURE — 81001 URINALYSIS AUTO W/SCOPE: CPT

## 2020-12-28 RX ORDER — DOXYCYCLINE HYCLATE 100 MG
100 TABLET ORAL 2 TIMES DAILY
Qty: 14 TABLET | Refills: 0 | Status: SHIPPED | OUTPATIENT
Start: 2020-12-28 | End: 2021-01-04

## 2020-12-28 RX ORDER — METRONIDAZOLE 500 MG/1
2000 TABLET ORAL ONCE
Status: COMPLETED | OUTPATIENT
Start: 2020-12-28 | End: 2020-12-28

## 2020-12-28 RX ORDER — CEFTRIAXONE 1 G/1
500 INJECTION, POWDER, FOR SOLUTION INTRAMUSCULAR; INTRAVENOUS ONCE
Status: COMPLETED | OUTPATIENT
Start: 2020-12-28 | End: 2020-12-28

## 2020-12-28 RX ADMIN — CEFTRIAXONE 500 MG: 1 INJECTION, POWDER, FOR SOLUTION INTRAMUSCULAR; INTRAVENOUS at 15:53

## 2020-12-28 RX ADMIN — METRONIDAZOLE 2000 MG: 500 TABLET ORAL at 15:11

## 2020-12-28 NOTE — ED NOTES
Patient to ed for treatment of std exposure and positive trichomonas disclosed by patients significant other.       Susi Ahuja RN  12/28/20 3071

## 2020-12-28 NOTE — ED PROVIDER NOTES
[facility] EMERGENCY DEPT VISIT      Patient Identification  Cezar Jaffe is a 60 y.o. male. Chief Complaint   Exposure to STD      History of Present Illness: This is a  60 y.o. male who presents ambulatory  to the ED with complaints of needing treated for STD exposure. Patients wife recently seen and diagnosed with trichomonas. Patient reports scant penile discharge when he urinates. No fever. No abdominal pain. No testicular pain. No genital sores. No dysuria. Has unprotected sex. Past Medical History:   Diagnosis Date    HTN (hypertension)     Hyperlipidemia        Past Surgical History:   Procedure Laterality Date    DENTAL SURGERY      FACIAL SURGERY  2005    KNEE SURGERY Left 2010    NECK SURGERY N/A 8/3/2020    REMOVAL OF SEBACEOUS CYST INFERIOR POSTERIOR NECK performed by Dane Nunes MD at [place] [address]       No current facility-administered medications for this encounter.      Current Outpatient Medications:     doxycycline hyclate (VIBRA-TABS) 100 MG tablet, Take 1 tablet by mouth 2 times daily for 7 days, Disp: 14 tablet, Rfl: 0    amLODIPine (NORVASC) 5 MG tablet, TAKE 1 TABLET BY MOUTH EVERY DAY, Disp: 90 tablet, Rfl: 1    atorvastatin (LIPITOR) 40 MG tablet, Take 1 tablet by mouth daily, Disp: 90 tablet, Rfl: 1    diclofenac (VOLTAREN) 75 MG EC tablet, TAKE 1 TABLET BY MOUTH TWICE A DAY, Disp: 60 tablet, Rfl: 3    Blood Pressure Monitoring (BLOOD PRESSURE CUFF) MISC, Use to check BP daily, Disp: 1 each, Rfl: 0    Allergies   Allergen Reactions    Lyrica [Pregabalin] Hives    Cymbalta [Duloxetine Hcl] Itching and Rash       Social History     Socioeconomic History    Marital status: Single     Spouse name: Not on file    Number of children: 1    Years of education: Not on file    Highest education level: Not on file   Occupational History    Not on file   Social Needs    Financial resource strain: Not on file    Food insecurity     Worry: Not on file     Inability: Not on file  Transportation needs     Medical: Not on file     Non-medical: Not on file   Tobacco Use    Smoking status: Never Smoker    Smokeless tobacco: Never Used   Substance and Sexual Activity    Alcohol use: Yes     Frequency: 2-4 times a month     Drinks per session: 1 or 2     Comment: couple of drinks per month     Drug use: Never    Sexual activity: Yes     Partners: Female   Lifestyle    Physical activity     Days per week: Not on file     Minutes per session: Not on file    Stress: Not on file   Relationships    Social connections     Talks on phone: Not on file     Gets together: Not on file     Attends Denominational service: Not on file     Active member of club or organization: Not on file     Attends meetings of clubs or organizations: Not on file     Relationship status: Not on file    Intimate partner violence     Fear of current or ex partner: Not on file     Emotionally abused: Not on file     Physically abused: Not on file     Forced sexual activity: Not on file   Other Topics Concern    Not on file   Social History Narrative    Not on file       Nursing Notes Reviewed      ROS:  General: no fever  ENT: no sinus congestion, no sore throat  RESP: no cough, no shortness of breath  CARDIAC: no chest pain  GI: no abdominal pain, no vomiting, no diarrhea  : no dysuria, no hematuria, no retention, no incontinence  Musculoskeletal: no arthralgia, no myalgia, no back pain,  no joint swelling  NEURO: no headache, no numbness, no weakness, no dizziness  DERM: no rash, no erythema, no ecchymosis, no wounds      PHYSICAL EXAM:  GENERAL APPEARANCE: Alka Dan is in no acute respiratory distress. Awake and alert.   VITAL SIGNS:   ED Triage Vitals [12/28/20 1436]   Enc Vitals Group      BP (!) 159/80      Pulse 55      Resp 14      Temp 97.8 °F (36.6 °C)      Temp Source Oral      SpO2 99 %      Weight 241 lb 9.6 oz (109.6 kg)      Height 5' 9\" (1.753 m)      Head Circumference       Peak Flow Pain Score       Pain Loc       Pain Edu? Excl. in [address]? HEAD: Normocephalic, atraumatic. EYES:  Extraocular muscles are intact. Conjunctivas are pink. Negative scleral icterus. ENT:  Mucous membranes are moist.    NECK: Nontender and supple. CHEST: Clear to auscultation bilaterally. No rales, rhonchi, or wheezing. HEART:  Regular rate and rhythm. No murmurs. Strong and equal pulses in the upper and lower extremities. ABDOMEN: Soft,  nondistended, positive bowel sounds. abdomen is nontender. MUSCULOSKELETAL:  Active range of motion of the upper and lower extremities. No edema. NEUROLOGICAL: Awake, alert and oriented x 3. Power intact in the upper and lower extremities. DERMATOLOGIC: No petechiae, rashes, or ecchymoses. ED COURSE AND MEDICAL DECISION MAKING:      Radiology:  All plain films have been evaluated by myself. They may have been overread by radiologist as noted in chart.  Other radiologic studies (i.e. CT, MRI, ultrasounds, etc ) have been interpreted by radiologist.     No orders to display       Labs:  Results for orders placed or performed during the hospital encounter of 12/28/20   Urinalysis Reflex to Culture    Specimen: Urine, clean catch   Result Value Ref Range    Color, UA Yellow Straw/Yellow    Clarity, UA Clear Clear    Glucose, Ur Negative Negative mg/dL    Bilirubin Urine Negative Negative    Ketones, Urine Negative Negative mg/dL    Specific Gravity, UA >=1.030 1.005 - 1.030    Blood, Urine LARGE (A) Negative    pH, UA 5.5 5.0 - 8.0    Protein, UA Negative Negative mg/dL    Urobilinogen, Urine 0.2 <2.0 E.U./dL    Nitrite, Urine Negative Negative    Leukocyte Esterase, Urine Negative Negative    Microscopic Examination YES     Urine Type NotGiven     Urine Reflex to Culture Not Indicated    Urine Trichomonas Evaluation   Result Value Ref Range    Urine Trichomonas Evaluation None Seen    Microscopic Urinalysis   Result Value Ref Range    Mucus, UA Rare (A) None Seen /LPF    WBC, UA 0-2 0 - 5 /HPF    RBC, UA 11-20 (A) 0 - 4 /HPF    Epithelial Cells, UA 0-1 0 - 5 /HPF       Treatment in the department:  Patient received   Medications   metroNIDAZOLE (FLAGYL) tablet 2,000 mg (2,000 mg Oral Given 12/28/20 1511)   cefTRIAXone (ROCEPHIN) injection 500 mg (500 mg Intramuscular Given 12/28/20 1553)         Medical decision making:  Patient presents to ED with concerns for STD. Patient has no fever, abdominal pain, testicular pain or swelling or signs of sepsis. Low concern for EPIDIDYMITIS, ORCHITIS, FOURNIERS GANGRENE, TESTICULAR TORSION, SEPSIS, OR DISSEMINATED GONORRHEA, and therefore feel comfortable in discharging patient to home. I do not feel he requires emergent testicular ultrasound or other emergent testing at this time. Clinical Impression:  1. STD exposure        Dispo:  Patient will be discharged  at this time. Patient was informed of this decision and agrees with plan. I have discussed lab and xray findings with patient and they understand. Questions were answered to the best of my ability. Discharge vitals:  Blood pressure (!) 159/80, pulse 55, temperature 97.8 °F (36.6 °C), temperature source Oral, resp. rate 14, height 5' 9\" (1.753 m), weight 241 lb 9.6 oz (109.6 kg), SpO2 99 %. Prescriptions given:   Discharge Medication List as of 12/28/2020  3:39 PM      START taking these medications    Details   doxycycline hyclate (VIBRA-TABS) 100 MG tablet Take 1 tablet by mouth 2 times daily for 7 days, Disp-14 tablet, R-0Normal               This chart was created using dragon voice recognition software.         Keenan Dominguez MD  12/29/20 5464

## 2020-12-28 NOTE — ED NOTES
Patient given discharge instructions verbal and written, patient verbalized understanding. Alert/oriented X4, Clear speech.   Patient exhibits no distress, ambulates with steady gait per self leaving unit, no further request.     Sigrid Kellogg RN  12/28/20 1600

## 2020-12-29 ENCOUNTER — TELEPHONE (OUTPATIENT)
Dept: FAMILY MEDICINE CLINIC | Age: 55
End: 2020-12-29

## 2020-12-29 DIAGNOSIS — M25.561 CHRONIC PAIN OF BOTH KNEES: Primary | ICD-10-CM

## 2020-12-29 DIAGNOSIS — G89.29 CHRONIC PAIN OF BOTH KNEES: Primary | ICD-10-CM

## 2020-12-29 DIAGNOSIS — G90.521 COMPLEX REGIONAL PAIN SYNDROME TYPE 1 OF RIGHT LOWER EXTREMITY: ICD-10-CM

## 2020-12-29 DIAGNOSIS — M25.562 CHRONIC PAIN OF BOTH KNEES: Primary | ICD-10-CM

## 2020-12-29 DIAGNOSIS — S97.81XS CRUSHING INJURY OF RIGHT FOOT, SEQUELA: ICD-10-CM

## 2020-12-29 DIAGNOSIS — G89.29 CHRONIC FOOT PAIN, RIGHT: ICD-10-CM

## 2020-12-29 DIAGNOSIS — M79.671 CHRONIC FOOT PAIN, RIGHT: ICD-10-CM

## 2020-12-30 LAB
C. TRACHOMATIS DNA ,URINE: NEGATIVE
N. GONORRHOEAE DNA, URINE: NEGATIVE

## 2021-01-07 ENCOUNTER — OFFICE VISIT (OUTPATIENT)
Dept: ORTHOPEDIC SURGERY | Age: 56
End: 2021-01-07
Payer: COMMERCIAL

## 2021-01-07 VITALS — HEIGHT: 69 IN | TEMPERATURE: 98.1 F | BODY MASS INDEX: 35.7 KG/M2 | WEIGHT: 241 LBS

## 2021-01-07 DIAGNOSIS — M25.562 CHRONIC PAIN OF BOTH KNEES: Primary | ICD-10-CM

## 2021-01-07 DIAGNOSIS — M17.0 PRIMARY OSTEOARTHRITIS OF BOTH KNEES: ICD-10-CM

## 2021-01-07 DIAGNOSIS — G89.29 CHRONIC PAIN OF BOTH KNEES: Primary | ICD-10-CM

## 2021-01-07 DIAGNOSIS — M25.561 CHRONIC PAIN OF BOTH KNEES: Primary | ICD-10-CM

## 2021-01-07 PROCEDURE — 99213 OFFICE O/P EST LOW 20 MIN: CPT | Performed by: PHYSICIAN ASSISTANT

## 2021-01-07 PROCEDURE — G8417 CALC BMI ABV UP PARAM F/U: HCPCS | Performed by: PHYSICIAN ASSISTANT

## 2021-01-07 PROCEDURE — G8427 DOCREV CUR MEDS BY ELIG CLIN: HCPCS | Performed by: PHYSICIAN ASSISTANT

## 2021-01-07 PROCEDURE — 3017F COLORECTAL CA SCREEN DOC REV: CPT | Performed by: PHYSICIAN ASSISTANT

## 2021-01-07 PROCEDURE — 1036F TOBACCO NON-USER: CPT | Performed by: PHYSICIAN ASSISTANT

## 2021-01-07 PROCEDURE — G8484 FLU IMMUNIZE NO ADMIN: HCPCS | Performed by: PHYSICIAN ASSISTANT

## 2021-01-07 PROCEDURE — 20610 DRAIN/INJ JOINT/BURSA W/O US: CPT | Performed by: PHYSICIAN ASSISTANT

## 2021-01-07 NOTE — PROGRESS NOTES
This dictation was done with Dragon dictation and may contain mechanical errors related to translation. I have today reviewed with Marnie Leyva the clinically relevant, past medical history, medications, allergies, family history, social history, and Review Of Systems form the patients most recent history form & I have documented any details relevant to today's presenting complaints in my history below. Mr. Wandy Culver self-reported past medical history, medications, allergies, family history, social history, and Review Of Systems form has been scanned into the chart under the \"Media\" tab. Subjective:  Marnie Leyva is a 60 y.o. who is here as an established patient with a new problem. He states that he has had treatment for left knee pain over the years but now his right knee is hurting both knees have a genuine varus alignment and medial joint line tenderness. Any type of activities involving weightbearing or bending the knees causing swelling and soreness in the medial and anterior compartment.   He was initially sent for x-rays including a standing AP lateral sunrise view of his left and his right knee      Patient Active Problem List   Diagnosis    BWC Fracture of phalanx of right foot, open    BWC Open fracture of phalanx of lesser toe of right foot    BWC Sprain of right foot    BWC Crushing injury of right foot    BWC Contusion of right foot    Complex regional pain syndrome type 1 of right lower extremity    Essential hypertension    Obesity, Class II, BMI 35-39.9    Hypercholesterolemia    Sebaceous cyst           Current Outpatient Medications on File Prior to Visit   Medication Sig Dispense Refill    diclofenac (VOLTAREN) 75 MG EC tablet TAKE 1 TABLET BY MOUTH TWICE A DAY 60 tablet 3    amLODIPine (NORVASC) 5 MG tablet TAKE 1 TABLET BY MOUTH EVERY DAY 90 tablet 1    atorvastatin (LIPITOR) 40 MG tablet Take 1 tablet by mouth daily 90 tablet 1  Blood Pressure Monitoring (BLOOD PRESSURE CUFF) MISC Use to check BP daily 1 each 0     No current facility-administered medications on file prior to visit. Objective:   Temperature 98.1 °F (36.7 °C), temperature source Infrared, height 5' 9\" (1.753 m), weight 241 lb (109.3 kg). On examination today this is a pleasant 57-year-old gentleman in no acute distress he is alert and oriented x3 he has a lot of crepitus during flexion extension through the patellofemoral joint and medial joint line tenderness. He can walk well without antalgia he has good distal pulses good dorsiflexion plantarflexion strength. He is negative for Estelle negative for Lockman on examination. Neuro exam grossly intact both lower extremities. Intact sensation to light touch. Motor exam 4+ to 5/5 in all major motor groups. Negative Sims's sign. Skin is warm, dry and intact with out erythema or significant increased temperature around the knee joint(s). There are no cutaneous lesions or lymphadenopathy present. X-RAYS:  X-rays taken the office today show bone-on-bone osteoarthritis in the medial compartment of both knees obvious fractures were seen patella tracking is adequate. These x-rays were shown to the patient      Assessment:  Bilateral knee medial osteoarthritis    Plan:  During today's visit, there was approximately 20 to minutes of face-to-face discussion in regards to the patient's current condition and treatment options. More than 50 % of the time was counseling and coordination of care as indicated above. We talked about short and long-term expectations and I think he is an excellent candidate for hyaluronic acid and cortisone injections. Ultimately he understands he would be better to do a total knee replacement.       PROCEDURE NOTE: After a discussion of the multiple options, they consented to a cortisone shot. 1 ml of 40mg/ml Kenalog and 2 ml's of 0.25%Marcaine were injected into both the right and the left knee joint spaces. The leg was slightly flexed and injected just lateral to the patella tendon to under the patella. This was done with sterile technique and they tolerated it well. This patient has a well documented history of osteoarthritis in their knee. They have trialed Nsaid medications, physical therapy exercises and steroid injections. They continue to have pain, swelling and dysfunction. At this junction, hyalgen injections are advisable.  I gave them literature and discussed the risks and benefits with them and would like to seek pre-authorization for        They will schedule a follow up in 1 to 2 weeks

## 2021-01-11 ENCOUNTER — TELEPHONE (OUTPATIENT)
Dept: FAMILY MEDICINE CLINIC | Age: 56
End: 2021-01-11

## 2021-01-11 NOTE — TELEPHONE ENCOUNTER
Needs a c9 referral approval yaima's comp to be seen at [facility]     Call Delia Hawley [phone]

## 2021-01-13 ENCOUNTER — TELEPHONE (OUTPATIENT)
Dept: ORTHOPEDIC SURGERY | Age: 56
End: 2021-01-13

## 2021-02-01 ENCOUNTER — OFFICE VISIT (OUTPATIENT)
Dept: ORTHOPEDIC SURGERY | Age: 56
End: 2021-02-01
Payer: COMMERCIAL

## 2021-02-01 VITALS — TEMPERATURE: 97.7 F

## 2021-02-01 DIAGNOSIS — M17.0 PRIMARY OSTEOARTHRITIS OF BOTH KNEES: Primary | ICD-10-CM

## 2021-02-01 PROCEDURE — G8484 FLU IMMUNIZE NO ADMIN: HCPCS | Performed by: PHYSICIAN ASSISTANT

## 2021-02-01 PROCEDURE — G8417 CALC BMI ABV UP PARAM F/U: HCPCS | Performed by: PHYSICIAN ASSISTANT

## 2021-02-01 PROCEDURE — 99213 OFFICE O/P EST LOW 20 MIN: CPT | Performed by: PHYSICIAN ASSISTANT

## 2021-02-01 PROCEDURE — 3017F COLORECTAL CA SCREEN DOC REV: CPT | Performed by: PHYSICIAN ASSISTANT

## 2021-02-01 PROCEDURE — G8427 DOCREV CUR MEDS BY ELIG CLIN: HCPCS | Performed by: PHYSICIAN ASSISTANT

## 2021-02-01 PROCEDURE — 1036F TOBACCO NON-USER: CPT | Performed by: PHYSICIAN ASSISTANT

## 2021-02-01 PROCEDURE — 20610 DRAIN/INJ JOINT/BURSA W/O US: CPT | Performed by: PHYSICIAN ASSISTANT

## 2021-02-01 NOTE — PROGRESS NOTES
This dictation was done with [employer] dictation and may contain mechanical errors related to translation. Temperature 97.7 °F (36.5 °C), temperature source Temporal.      This is a pleasant 57-year-old gentleman who has ongoing pain from osteoarthritis in both of his knees. He has been preauthorized for hyaluronic acid injections and is here for his first in a series of 3Gel-syn injections. On examination his knee and his 1-2+ edema bilaterally with crepitus during flexion extension is negative for varus or valgus laxity negative for Estelle or a Lockman. My impression is bilateral osteoarthritis of the knees    With his consent he was injected with theGel-syn injection into the left into the right knee joint in the appropriate sterile fashion.   He tolerated well we talked about postinjection care and follow-up with us in a week

## 2021-02-08 ENCOUNTER — OFFICE VISIT (OUTPATIENT)
Dept: ORTHOPEDIC SURGERY | Age: 56
End: 2021-02-08
Payer: COMMERCIAL

## 2021-02-08 VITALS — TEMPERATURE: 97.6 F

## 2021-02-08 DIAGNOSIS — M17.0 PRIMARY OSTEOARTHRITIS OF BOTH KNEES: Primary | ICD-10-CM

## 2021-02-08 PROCEDURE — 20610 DRAIN/INJ JOINT/BURSA W/O US: CPT | Performed by: PHYSICIAN ASSISTANT

## 2021-02-08 NOTE — PROGRESS NOTES
This dictation was done with [employer] dictation and may contain mechanical errors related to translation. Temperature 97.6 °F (36.4 °C), temperature source Temporal.      This is a pleasant 57-year-old gentleman with ongoing pain from osteoarthritis in both of his knees. Currently he has 1+ edema with crepitus during flexion extension through the patellofemoral joint of both knees there is no instability good distal pulses good dorsiflexion plantarflexion strength. Impression is bilateral knee osteoarthritis. With his consent he was injected with the second in a series of 3 Gel-Syn injections. He tolerated well he complained of pain that he is having in his right foot and he is seeing a pain management physician for back and foot pain. I suggested seeing  at some point but in the meantime we can try some Voltaren gel. We will see him in follow-up in a week.

## 2021-02-15 ENCOUNTER — OFFICE VISIT (OUTPATIENT)
Dept: ORTHOPEDIC SURGERY | Age: 56
End: 2021-02-15
Payer: COMMERCIAL

## 2021-02-15 VITALS
DIASTOLIC BLOOD PRESSURE: 89 MMHG | HEIGHT: 69 IN | SYSTOLIC BLOOD PRESSURE: 140 MMHG | BODY MASS INDEX: 35.7 KG/M2 | WEIGHT: 241 LBS | TEMPERATURE: 97.3 F

## 2021-02-15 DIAGNOSIS — M17.0 PRIMARY OSTEOARTHRITIS OF BOTH KNEES: Primary | ICD-10-CM

## 2021-02-15 PROCEDURE — 1036F TOBACCO NON-USER: CPT | Performed by: PHYSICIAN ASSISTANT

## 2021-02-15 PROCEDURE — G8417 CALC BMI ABV UP PARAM F/U: HCPCS | Performed by: PHYSICIAN ASSISTANT

## 2021-02-15 PROCEDURE — 99213 OFFICE O/P EST LOW 20 MIN: CPT | Performed by: PHYSICIAN ASSISTANT

## 2021-02-15 PROCEDURE — G8427 DOCREV CUR MEDS BY ELIG CLIN: HCPCS | Performed by: PHYSICIAN ASSISTANT

## 2021-02-15 PROCEDURE — G8484 FLU IMMUNIZE NO ADMIN: HCPCS | Performed by: PHYSICIAN ASSISTANT

## 2021-02-15 PROCEDURE — 20610 DRAIN/INJ JOINT/BURSA W/O US: CPT | Performed by: PHYSICIAN ASSISTANT

## 2021-02-15 PROCEDURE — 3017F COLORECTAL CA SCREEN DOC REV: CPT | Performed by: PHYSICIAN ASSISTANT

## 2021-02-15 NOTE — PROGRESS NOTES
This dictation was done with [employer] dictation and may contain mechanical errors related to translation. Blood pressure (!) 140/89, temperature 97.3 °F (36.3 °C), temperature source Infrared, height 5' 9\" (1.753 m), weight 241 lb (109.3 kg). This is a pleasant 64-year-old gentleman who is here for his third and finalGel-syn injection. He states he still has a lot of pain almost up to 9 out of 10 and it seems to be worse of the right than the left. This is consistent with his x-rays that show advanced osteoarthritis. We talked about short and long-term expectations and he did consent to him to start final injection and we will see how he does over the next 3 to 4 weeks. If he is not significantly better than I would consider knee replacement  With his consent he was injected with theGelsyn injection into his left and his right knee joint this was done a proper sterile fashion and he tolerated well.     Postinjection care and we will see him in follow-up in 4 to 5 weeks

## 2021-02-23 DIAGNOSIS — I10 ESSENTIAL HYPERTENSION: ICD-10-CM

## 2021-02-23 RX ORDER — AMLODIPINE BESYLATE 5 MG/1
TABLET ORAL
Qty: 90 TABLET | Refills: 1 | Status: SHIPPED | OUTPATIENT
Start: 2021-02-23 | End: 2021-03-19

## 2021-03-19 ENCOUNTER — OFFICE VISIT (OUTPATIENT)
Dept: FAMILY MEDICINE CLINIC | Age: 56
End: 2021-03-19
Payer: COMMERCIAL

## 2021-03-19 VITALS
BODY MASS INDEX: 33.08 KG/M2 | WEIGHT: 224 LBS | SYSTOLIC BLOOD PRESSURE: 140 MMHG | TEMPERATURE: 97.3 F | HEART RATE: 64 BPM | OXYGEN SATURATION: 98 % | DIASTOLIC BLOOD PRESSURE: 90 MMHG

## 2021-03-19 DIAGNOSIS — I10 ESSENTIAL HYPERTENSION: Primary | ICD-10-CM

## 2021-03-19 DIAGNOSIS — E78.00 HYPERCHOLESTEROLEMIA: ICD-10-CM

## 2021-03-19 DIAGNOSIS — I10 ESSENTIAL HYPERTENSION: ICD-10-CM

## 2021-03-19 DIAGNOSIS — E66.9 OBESITY, CLASS I, BMI 30-34.9: ICD-10-CM

## 2021-03-19 LAB
A/G RATIO: 1.3 (ref 1.1–2.2)
ALBUMIN SERPL-MCNC: 4 G/DL (ref 3.4–5)
ALP BLD-CCNC: 101 U/L (ref 40–129)
ALT SERPL-CCNC: 62 U/L (ref 10–40)
ANION GAP SERPL CALCULATED.3IONS-SCNC: 12 MMOL/L (ref 3–16)
AST SERPL-CCNC: 30 U/L (ref 15–37)
BILIRUB SERPL-MCNC: 0.4 MG/DL (ref 0–1)
BUN BLDV-MCNC: 18 MG/DL (ref 7–20)
CALCIUM SERPL-MCNC: 8.9 MG/DL (ref 8.3–10.6)
CHLORIDE BLD-SCNC: 99 MMOL/L (ref 99–110)
CHOLESTEROL, FASTING: 193 MG/DL (ref 0–199)
CO2: 27 MMOL/L (ref 21–32)
CREAT SERPL-MCNC: 1 MG/DL (ref 0.9–1.3)
GFR AFRICAN AMERICAN: >60
GFR NON-AFRICAN AMERICAN: >60
GLOBULIN: 3.2 G/DL
GLUCOSE FASTING: 82 MG/DL (ref 70–99)
HDLC SERPL-MCNC: 50 MG/DL (ref 40–60)
LDL CHOLESTEROL CALCULATED: 129 MG/DL
POTASSIUM SERPL-SCNC: 4 MMOL/L (ref 3.5–5.1)
SODIUM BLD-SCNC: 138 MMOL/L (ref 136–145)
TOTAL PROTEIN: 7.2 G/DL (ref 6.4–8.2)
TRIGLYCERIDE, FASTING: 68 MG/DL (ref 0–150)
VLDLC SERPL CALC-MCNC: 14 MG/DL

## 2021-03-19 PROCEDURE — 3017F COLORECTAL CA SCREEN DOC REV: CPT | Performed by: NURSE PRACTITIONER

## 2021-03-19 PROCEDURE — G8427 DOCREV CUR MEDS BY ELIG CLIN: HCPCS | Performed by: NURSE PRACTITIONER

## 2021-03-19 PROCEDURE — 99214 OFFICE O/P EST MOD 30 MIN: CPT | Performed by: NURSE PRACTITIONER

## 2021-03-19 PROCEDURE — G8417 CALC BMI ABV UP PARAM F/U: HCPCS | Performed by: NURSE PRACTITIONER

## 2021-03-19 PROCEDURE — G8484 FLU IMMUNIZE NO ADMIN: HCPCS | Performed by: NURSE PRACTITIONER

## 2021-03-19 PROCEDURE — 1036F TOBACCO NON-USER: CPT | Performed by: NURSE PRACTITIONER

## 2021-03-19 RX ORDER — TRAMADOL HYDROCHLORIDE 50 MG/1
50 TABLET ORAL 3 TIMES DAILY
COMMUNITY
Start: 2021-03-07 | End: 2021-12-17

## 2021-03-19 RX ORDER — AMLODIPINE BESYLATE 10 MG/1
TABLET ORAL
Qty: 90 TABLET | Refills: 0 | Status: SHIPPED | OUTPATIENT
Start: 2021-03-19 | End: 2021-04-16 | Stop reason: SDUPTHER

## 2021-03-19 ASSESSMENT — ENCOUNTER SYMPTOMS
CHEST TIGHTNESS: 0
NAUSEA: 0
DIARRHEA: 0
VOMITING: 0
COUGH: 0
CONSTIPATION: 0
SHORTNESS OF BREATH: 0
BACK PAIN: 1

## 2021-03-19 ASSESSMENT — PATIENT HEALTH QUESTIONNAIRE - PHQ9
1. LITTLE INTEREST OR PLEASURE IN DOING THINGS: 0
SUM OF ALL RESPONSES TO PHQ QUESTIONS 1-9: 0

## 2021-03-19 NOTE — PROGRESS NOTES
3/19/2021    This is a 60 y.o. male   Chief Complaint   Patient presents with    Hypertension   . HPI  Hypertension:  Home blood pressure monitoring: No.  He is not adherent to a low sodium diet. Patient denies chest pain, shortness of breath, headache, peripheral edema and palpitations. Antihypertensive medication side effects: no medication side effects noted. Sodium (mmol/L)   Date Value   07/24/2020 141    BUN (mg/dL)   Date Value   07/24/2020 9    Glucose (mg/dL)   Date Value   09/24/2019 94      Potassium (mmol/L)   Date Value   07/24/2020 3.7    CREATININE (mg/dL)   Date Value   07/24/2020 1.0         Hyperlipidemia:  No new myalgias or GI upset on atorvastatin (Lipitor). Medication compliance: compliant most of the time. Patient is not following a low fat, low cholesterol diet. He is not exercising regularly. Lab Results   Component Value Date    HDL 45 07/24/2020    LDLCALC 211 (H) 07/24/2020     Lab Results   Component Value Date    ALT 32 07/24/2020    AST 28 07/24/2020        Obesity- no routine diet or exercise. Continues to follow with pain management [facility] for chronic pain in right foot and low back pain. Prescribed lyrica TID and tramadol TID from pain specialist.  Patient reports that he can take brand-name Lyrica but not able to take the generic due to allergy. Patient reports these pain medications help decrease his pain slightly. Had epidural injection in low back without improvement in pain.      Patient Active Problem List   Diagnosis    BWC Fracture of phalanx of right foot, open    BWC Open fracture of phalanx of lesser toe of right foot    BWC Sprain of right foot    BWC Crushing injury of right foot    BWC Contusion of right foot    Complex regional pain syndrome type 1 of right lower extremity    Essential hypertension    Obesity, Class II, BMI 35-39.9    Hypercholesterolemia    Sebaceous cyst Head: Normocephalic and atraumatic. Neck:      Musculoskeletal: Neck supple. Cardiovascular:      Rate and Rhythm: Normal rate and regular rhythm. Heart sounds: Normal heart sounds. No murmur. No friction rub. No gallop. Pulmonary:      Effort: Pulmonary effort is normal. No respiratory distress. Breath sounds: Normal breath sounds. Musculoskeletal:      Right lower leg: No edema. Left lower leg: No edema. Skin:     General: Skin is warm and dry. Neurological:      Mental Status: He is alert and oriented to person, place, and time. Psychiatric:         Behavior: Behavior normal.         Thought Content: Thought content normal.         Judgment: Judgment normal.         Assessmentand Plan  Tray Barber was seen today for hypertension. Diagnoses and all orders for this visit:    Essential hypertension: uncontrolled   Increase amlodipine    -     amLODIPine (NORVASC) 10 MG tablet; TAKE 1 TABLET BY MOUTH EVERY DAY  Advised to work on aerobic exercise and weight loss. Hypercholesterolemia  Started on atorvastatin 7/2020  Repeat lipids, CMP  Advised to work on diet, aerobic exercise, and weight loss. Obesity, Class I, BMI 30-34. 9: weight has decreased from last visit  Diet, aerobic exercise, and weight loss discussed and needed. Return in about 4 weeks (around 4/16/2021), or if symptoms worsen or fail to improve, for blood pressure.

## 2021-03-22 ENCOUNTER — OFFICE VISIT (OUTPATIENT)
Dept: ORTHOPEDIC SURGERY | Age: 56
End: 2021-03-22
Payer: COMMERCIAL

## 2021-03-22 VITALS — BODY MASS INDEX: 33.18 KG/M2 | HEIGHT: 69 IN | TEMPERATURE: 98.6 F | WEIGHT: 224 LBS

## 2021-03-22 DIAGNOSIS — M17.0 PRIMARY OSTEOARTHRITIS OF BOTH KNEES: Primary | ICD-10-CM

## 2021-03-22 PROBLEM — R74.01 ELEVATED ALT MEASUREMENT: Status: ACTIVE | Noted: 2021-03-22

## 2021-03-22 PROCEDURE — 1036F TOBACCO NON-USER: CPT | Performed by: ORTHOPAEDIC SURGERY

## 2021-03-22 PROCEDURE — G8484 FLU IMMUNIZE NO ADMIN: HCPCS | Performed by: ORTHOPAEDIC SURGERY

## 2021-03-22 PROCEDURE — 3017F COLORECTAL CA SCREEN DOC REV: CPT | Performed by: ORTHOPAEDIC SURGERY

## 2021-03-22 PROCEDURE — 99213 OFFICE O/P EST LOW 20 MIN: CPT | Performed by: ORTHOPAEDIC SURGERY

## 2021-03-22 PROCEDURE — 20610 DRAIN/INJ JOINT/BURSA W/O US: CPT | Performed by: ORTHOPAEDIC SURGERY

## 2021-03-22 PROCEDURE — G8417 CALC BMI ABV UP PARAM F/U: HCPCS | Performed by: ORTHOPAEDIC SURGERY

## 2021-03-22 PROCEDURE — G8427 DOCREV CUR MEDS BY ELIG CLIN: HCPCS | Performed by: ORTHOPAEDIC SURGERY

## 2021-03-22 NOTE — PROGRESS NOTES
[facility]  [facility]    Chief Complaint: Bilateral knee pain    HPI:  She Gibbons is a 60 y.o. who is here in follow-up of bilateral knee pain. He has been seen Nakita Benjamin and had bilateral Gelsyn injections completed on February 15, 2021. He reports mild relief of symptoms. He continues to have knee pain on a daily basis. He feels the pain both medially and anterior in both knees. Walking for prolonged times or getting off of off of the floor has pain. He also has stiffness after being seated for long period of time. There is no history of injury or surgery. He takes diclofenac for pain. Patient Active Problem List   Diagnosis    BWC Fracture of phalanx of right foot, open    BWC Open fracture of phalanx of lesser toe of right foot    BWC Sprain of right foot    BWC Crushing injury of right foot    BWC Contusion of right foot    Complex regional pain syndrome type 1 of right lower extremity    Essential hypertension    Obesity, Class II, BMI 35-39.9    Hypercholesterolemia    Sebaceous cyst       ROS:  Constitutional: denies fever, chills, weight loss  MSK: denies pain in other joints, muscle aches  Neurological: denies numbness, tingling, weakness    Exam:  Temperature 98.6 °F (37 °C), temperature source Infrared, height 5' 9\" (1.753 m), weight 224 lb (101.6 kg). Appearance: sitting in exam room chair, appears to be in no acute distress, awake and alert  Resp: unlabored breathing on room air  Skin: warm, dry and intact with out erythema or significant increased temperature  Neuro: grossly intact both lower extremities. Intact sensation to light touch. Motor exam 4+ to 5/5 in all major motor groups. Bilateral knees: Examination reveals that knee range of motion is 0 to 130 degrees. There is varus deformity, positive crepitus, positive joint line tenderness, positive antalgic gait. Neurologically, plantar flexion and dorsiflexion is intact.

## 2021-04-04 DIAGNOSIS — M25.561 CHRONIC PAIN OF BOTH KNEES: ICD-10-CM

## 2021-04-04 DIAGNOSIS — M25.562 CHRONIC PAIN OF BOTH KNEES: ICD-10-CM

## 2021-04-04 DIAGNOSIS — G89.29 CHRONIC PAIN OF BOTH KNEES: ICD-10-CM

## 2021-04-05 RX ORDER — DICLOFENAC SODIUM 75 MG/1
TABLET, DELAYED RELEASE ORAL
Qty: 60 TABLET | Refills: 3 | Status: SHIPPED | OUTPATIENT
Start: 2021-04-05 | End: 2021-05-11

## 2021-04-07 DIAGNOSIS — M25.561 CHRONIC PAIN OF BOTH KNEES: ICD-10-CM

## 2021-04-07 DIAGNOSIS — G89.29 CHRONIC PAIN OF BOTH KNEES: ICD-10-CM

## 2021-04-07 DIAGNOSIS — M25.562 CHRONIC PAIN OF BOTH KNEES: ICD-10-CM

## 2021-04-07 RX ORDER — DICLOFENAC SODIUM 75 MG/1
TABLET, DELAYED RELEASE ORAL
Qty: 60 TABLET | Refills: 3 | OUTPATIENT
Start: 2021-04-07

## 2021-04-16 ENCOUNTER — OFFICE VISIT (OUTPATIENT)
Dept: FAMILY MEDICINE CLINIC | Age: 56
End: 2021-04-16
Payer: COMMERCIAL

## 2021-04-16 VITALS
OXYGEN SATURATION: 96 % | SYSTOLIC BLOOD PRESSURE: 126 MMHG | BODY MASS INDEX: 32.82 KG/M2 | HEART RATE: 78 BPM | WEIGHT: 221.6 LBS | RESPIRATION RATE: 14 BRPM | DIASTOLIC BLOOD PRESSURE: 74 MMHG | HEIGHT: 69 IN

## 2021-04-16 DIAGNOSIS — E78.00 HYPERCHOLESTEROLEMIA: ICD-10-CM

## 2021-04-16 DIAGNOSIS — E66.9 OBESITY, CLASS I, BMI 30-34.9: ICD-10-CM

## 2021-04-16 DIAGNOSIS — R74.01 ELEVATED ALT MEASUREMENT: ICD-10-CM

## 2021-04-16 DIAGNOSIS — I10 ESSENTIAL HYPERTENSION: Primary | ICD-10-CM

## 2021-04-16 LAB
ALBUMIN SERPL-MCNC: 4.4 G/DL (ref 3.4–5)
ALP BLD-CCNC: 112 U/L (ref 40–129)
ALT SERPL-CCNC: 57 U/L (ref 10–40)
AST SERPL-CCNC: 27 U/L (ref 15–37)
BILIRUB SERPL-MCNC: 0.5 MG/DL (ref 0–1)
BILIRUBIN DIRECT: <0.2 MG/DL (ref 0–0.3)
BILIRUBIN, INDIRECT: ABNORMAL MG/DL (ref 0–1)
TOTAL PROTEIN: 7.9 G/DL (ref 6.4–8.2)

## 2021-04-16 PROCEDURE — 1036F TOBACCO NON-USER: CPT | Performed by: NURSE PRACTITIONER

## 2021-04-16 PROCEDURE — G8417 CALC BMI ABV UP PARAM F/U: HCPCS | Performed by: NURSE PRACTITIONER

## 2021-04-16 PROCEDURE — 99214 OFFICE O/P EST MOD 30 MIN: CPT | Performed by: NURSE PRACTITIONER

## 2021-04-16 PROCEDURE — G8427 DOCREV CUR MEDS BY ELIG CLIN: HCPCS | Performed by: NURSE PRACTITIONER

## 2021-04-16 PROCEDURE — 3017F COLORECTAL CA SCREEN DOC REV: CPT | Performed by: NURSE PRACTITIONER

## 2021-04-16 RX ORDER — AMLODIPINE BESYLATE 10 MG/1
TABLET ORAL
Qty: 90 TABLET | Refills: 1 | Status: SHIPPED | OUTPATIENT
Start: 2021-04-16 | End: 2021-10-18 | Stop reason: SDUPTHER

## 2021-04-16 ASSESSMENT — ENCOUNTER SYMPTOMS
VOMITING: 0
DIARRHEA: 0
COUGH: 0
SHORTNESS OF BREATH: 0
CONSTIPATION: 0
BACK PAIN: 1
NAUSEA: 0
CHEST TIGHTNESS: 0

## 2021-04-16 NOTE — PROGRESS NOTES
Essential hypertension    Obesity, Class II, BMI 35-39.9    Hypercholesterolemia    Sebaceous cyst    Elevated ALT measurement          Current Outpatient Medications   Medication Sig Dispense Refill    diclofenac (VOLTAREN) 75 MG EC tablet TAKE 1 TABLET BY MOUTH TWICE A DAY 60 tablet 3    LYRICA 100 MG capsule Take 100 mg by mouth 3 times daily.  traMADol (ULTRAM) 50 MG tablet Take 50 mg by mouth 3 times daily.  amLODIPine (NORVASC) 10 MG tablet TAKE 1 TABLET BY MOUTH EVERY DAY 90 tablet 0    atorvastatin (LIPITOR) 40 MG tablet TAKE 1 TABLET BY MOUTH EVERY DAY 90 tablet 1    Blood Pressure Monitoring (BLOOD PRESSURE CUFF) MISC Use to check BP daily 1 each 0     No current facility-administered medications for this visit. Allergies   Allergen Reactions    Lyrica [Pregabalin] Hives    Cymbalta [Duloxetine Hcl] Itching and Rash       Review of Systems   Constitutional: Negative for activity change and fever. Respiratory: Negative for cough, chest tightness and shortness of breath. Cardiovascular: Negative for chest pain, palpitations and leg swelling. Gastrointestinal: Negative for constipation, diarrhea, nausea and vomiting. Musculoskeletal: Positive for arthralgias, back pain and myalgias. Neurological: Negative for dizziness, syncope, weakness and headaches. Psychiatric/Behavioral: Negative for confusion. Vitals:    04/16/21 0758   BP: 126/74   Pulse: 78   Resp: 14   SpO2: 96%   Weight: 221 lb 9.6 oz (100.5 kg)   Height: 5' 9\" (1.753 m)       Body mass index is 32.72 kg/m². Wt Readings from Last 3 Encounters:   04/16/21 221 lb 9.6 oz (100.5 kg)   03/22/21 224 lb (101.6 kg)   03/19/21 224 lb (101.6 kg)       BP Readings from Last 3 Encounters:   04/16/21 126/74   03/19/21 (!) 140/90   02/15/21 (!) 140/89       Physical Exam  Vitals signs and nursing note reviewed. Constitutional:       General: He is not in acute distress. Appearance: He is well-developed.  He is obese. HENT:      Head: Normocephalic and atraumatic. Neck:      Musculoskeletal: Neck supple. Cardiovascular:      Rate and Rhythm: Normal rate and regular rhythm. Heart sounds: Normal heart sounds. No murmur. No friction rub. No gallop. Pulmonary:      Effort: Pulmonary effort is normal. No respiratory distress. Breath sounds: Normal breath sounds. Musculoskeletal:      Right lower leg: No edema. Left lower leg: No edema. Skin:     General: Skin is warm and dry. Neurological:      Mental Status: He is alert and oriented to person, place, and time. Psychiatric:         Behavior: Behavior normal.         Thought Content: Thought content normal.         Judgment: Judgment normal.         Assessmentand Plan  Es Porter was seen today for hypertension. Diagnoses and all orders for this visit:    Essential hypertension: controlled   -     amLODIPine (NORVASC) 10 MG tablet; TAKE 1 TABLET BY MOUTH EVERY DAY  Advised to work on aerobic exercise and weight loss. Hypercholesterolemia  Started on atorvastatin 40 mg daily 7/2020  Repeat lipids improved 3/2021  Advised to work on diet, aerobic exercise, and weight loss. Obesity, Class I, BMI 30-34. 9: weight has decreased from last visit  Diet, aerobic exercise, and weight loss discussed and needed. Elevated ALT measurement  -     Hepatic Function Panel; Future  Repeat lab. Advised to avoid alcohol with his pain medications. Discussed the importance of weight loss. Return in about 5 months (around 9/16/2021), or if symptoms worsen or fail to improve, for chronic conditions.

## 2021-04-29 ENCOUNTER — TELEPHONE (OUTPATIENT)
Dept: EMERGENCY DEPT | Age: 56
End: 2021-04-29

## 2021-05-10 DIAGNOSIS — M25.561 CHRONIC PAIN OF BOTH KNEES: ICD-10-CM

## 2021-05-10 DIAGNOSIS — G89.29 CHRONIC PAIN OF BOTH KNEES: ICD-10-CM

## 2021-05-10 DIAGNOSIS — M25.562 CHRONIC PAIN OF BOTH KNEES: ICD-10-CM

## 2021-05-11 RX ORDER — DICLOFENAC SODIUM 75 MG/1
TABLET, DELAYED RELEASE ORAL
Qty: 60 TABLET | Refills: 3 | Status: SHIPPED | OUTPATIENT
Start: 2021-05-11 | End: 2021-10-04

## 2021-05-24 ENCOUNTER — OFFICE VISIT (OUTPATIENT)
Dept: ORTHOPEDIC SURGERY | Age: 56
End: 2021-05-24
Payer: COMMERCIAL

## 2021-05-24 VITALS — HEIGHT: 69 IN | BODY MASS INDEX: 33.18 KG/M2 | WEIGHT: 224 LBS

## 2021-05-24 DIAGNOSIS — M17.0 PRIMARY OSTEOARTHRITIS OF BOTH KNEES: Primary | ICD-10-CM

## 2021-05-24 PROCEDURE — G8427 DOCREV CUR MEDS BY ELIG CLIN: HCPCS | Performed by: ORTHOPAEDIC SURGERY

## 2021-05-24 PROCEDURE — 1036F TOBACCO NON-USER: CPT | Performed by: ORTHOPAEDIC SURGERY

## 2021-05-24 PROCEDURE — G8417 CALC BMI ABV UP PARAM F/U: HCPCS | Performed by: ORTHOPAEDIC SURGERY

## 2021-05-24 PROCEDURE — 99213 OFFICE O/P EST LOW 20 MIN: CPT | Performed by: ORTHOPAEDIC SURGERY

## 2021-05-24 PROCEDURE — 3017F COLORECTAL CA SCREEN DOC REV: CPT | Performed by: ORTHOPAEDIC SURGERY

## 2021-05-24 NOTE — PROGRESS NOTES
[facility]    Chief Complaint: Bilateral knee pain    HPI:  Marilin Garcia is a 60 y.o. who is here in follow-up of bilateral knee pain. He has been seen Joaquina Gonzalez and had bilateral Gelsyn injections completed on February 15, 2021. He was last seen on March 22 at which point he had bilateral knee steroid injections. He reports good relief of symptoms but starting to have pain on a daily basis again. He feels the pain both medially and anterior in both knees. Walking for prolonged times or getting off of off of the floor has pain. He also has stiffness after being seated for long period of time. There is no history of injury or surgery. He takes diclofenac for pain. Patient Active Problem List   Diagnosis    BWC Fracture of phalanx of right foot, open    BWC Open fracture of phalanx of lesser toe of right foot    BWC Sprain of right foot    BWC Crushing injury of right foot    BWC Contusion of right foot    Complex regional pain syndrome type 1 of right lower extremity    Essential hypertension    Obesity, Class II, BMI 35-39.9    Hypercholesterolemia    Sebaceous cyst    Elevated ALT measurement       ROS:  Constitutional: denies fever, chills, weight loss  MSK: denies pain in other joints, muscle aches  Neurological: denies numbness, tingling, weakness    Exam:  Height 5' 9\" (1.753 m), weight 224 lb (101.6 kg). Appearance: sitting in exam room chair, appears to be in no acute distress, awake and alert  Resp: unlabored breathing on room air  Skin: warm, dry and intact with out erythema or significant increased temperature  Neuro: grossly intact both lower extremities. Intact sensation to light touch. Motor exam 4+ to 5/5 in all major motor groups. Bilateral knees: Examination reveals that knee range of motion is 0 to 130 degrees. There is varus deformity, positive crepitus, positive joint line tenderness, positive antalgic gait. Neurologically, plantar flexion and dorsiflexion is intact. Pulses palpable distally. 5/5 strength. Imaging:  Prior bilateral knee radiographs reviewed and are significant for bone-on-bone osteoarthritis of bilateral medial compartments with tricompartmental degenerative changes throughout both knees. Assessment:  Bilateral knee osteoarthritis    Plan:  We discussed the diagnosis and treatment options. He had mild relief of symptoms with a steroid injection in March and recent viscosupplementation. We briefly discussed total knee arthroplasty. He would like to continue nonoperative management of his bilateral knee osteoarthritis at this time. He will continue NSAIDs. He may come back in 1 month for repeat steroid injection or in August for hyaluronic acid injections. Total time spent on today's encounter was at least 22 minutes. This time included reviewing prior notes, radiographs, and lab results when available, reviewing history obtained by medical assistant, performing history and physical exam, reviewing tests/radiographs with the patient, counseling the patient, ordering medications or tests, documentation in the electronic health record, and coordination of care. This dictation was done with [employer] dictation and may contain mechanical errors related to translation.

## 2021-05-28 ENCOUNTER — TELEPHONE (OUTPATIENT)
Dept: ORTHOPEDIC SURGERY | Age: 56
End: 2021-05-28

## 2021-05-28 NOTE — TELEPHONE ENCOUNTER
05/28/2021  PAGE   (QTY 1)  BILATERAL KNEE.       TOO SOON TO REQUEST. NOT ELIGIBLE UNTIL ON / AFTER AUGUST 15, 2021. CAN NOT REQUEST EARLIER THAN 30 DAYS PRIOR. PUT NEW ORDER IN TOWARDS END OF July.   AP

## 2021-06-02 ENCOUNTER — CLINICAL DOCUMENTATION (OUTPATIENT)
Dept: OTHER | Age: 56
End: 2021-06-02

## 2021-07-15 DIAGNOSIS — E78.00 HYPERCHOLESTEROLEMIA: ICD-10-CM

## 2021-07-15 RX ORDER — ATORVASTATIN CALCIUM 40 MG/1
TABLET, FILM COATED ORAL
Qty: 90 TABLET | Refills: 1 | Status: SHIPPED | OUTPATIENT
Start: 2021-07-15 | End: 2022-01-10

## 2021-10-04 DIAGNOSIS — M25.561 CHRONIC PAIN OF BOTH KNEES: ICD-10-CM

## 2021-10-04 DIAGNOSIS — G89.29 CHRONIC PAIN OF BOTH KNEES: ICD-10-CM

## 2021-10-04 DIAGNOSIS — M25.562 CHRONIC PAIN OF BOTH KNEES: ICD-10-CM

## 2021-10-04 RX ORDER — DICLOFENAC SODIUM 75 MG/1
TABLET, DELAYED RELEASE ORAL
Qty: 60 TABLET | Refills: 3 | Status: SHIPPED | OUTPATIENT
Start: 2021-10-04 | End: 2022-03-14

## 2021-10-18 DIAGNOSIS — I10 ESSENTIAL HYPERTENSION: ICD-10-CM

## 2021-10-18 RX ORDER — AMLODIPINE BESYLATE 10 MG/1
TABLET ORAL
Qty: 90 TABLET | Refills: 0 | Status: SHIPPED | OUTPATIENT
Start: 2021-10-18 | End: 2021-12-17 | Stop reason: SDUPTHER

## 2021-10-18 NOTE — TELEPHONE ENCOUNTER
----- Message from Hermes Banks sent at 10/16/2021 11:36 AM EDT -----  Subject: Refill Request    QUESTIONS  Name of Medication? amLODIPine (NORVASC) 10 MG tablet  Patient-reported dosage and instructions? once daily  How many days do you have left? 0  Preferred Pharmacy? [facility] #[id number] Pharmacy phone number (if available)? [phone]  Additional Information for Provider? pt has been out of medication for a   while and is having elevated BP. Pt req refill ASAP Pt has appt 10/22 for   BP check up  ---------------------------------------------------------------------------  --------------  CALL BACK INFO  What is the best way for the office to contact you? OK to leave message on   voicemail  Preferred Call Back Phone Number?  [phone]

## 2021-10-22 ENCOUNTER — OFFICE VISIT (OUTPATIENT)
Dept: FAMILY MEDICINE CLINIC | Age: 56
End: 2021-10-22
Payer: COMMERCIAL

## 2021-10-22 VITALS
BODY MASS INDEX: 36.62 KG/M2 | OXYGEN SATURATION: 98 % | HEIGHT: 69 IN | DIASTOLIC BLOOD PRESSURE: 76 MMHG | HEART RATE: 82 BPM | WEIGHT: 247.25 LBS | SYSTOLIC BLOOD PRESSURE: 142 MMHG

## 2021-10-22 DIAGNOSIS — Z12.11 COLON CANCER SCREENING: ICD-10-CM

## 2021-10-22 DIAGNOSIS — E78.00 HYPERCHOLESTEROLEMIA: ICD-10-CM

## 2021-10-22 DIAGNOSIS — R74.01 ELEVATED ALT MEASUREMENT: ICD-10-CM

## 2021-10-22 DIAGNOSIS — E66.9 OBESITY, CLASS II, BMI 35-39.9: ICD-10-CM

## 2021-10-22 DIAGNOSIS — I10 ESSENTIAL HYPERTENSION: ICD-10-CM

## 2021-10-22 DIAGNOSIS — I10 ESSENTIAL HYPERTENSION: Primary | ICD-10-CM

## 2021-10-22 LAB
A/G RATIO: 0.8 (ref 1.1–2.2)
ALBUMIN SERPL-MCNC: 3.8 G/DL (ref 3.4–5)
ALP BLD-CCNC: 82 U/L (ref 40–129)
ALT SERPL-CCNC: 68 U/L (ref 10–40)
ANION GAP SERPL CALCULATED.3IONS-SCNC: 12 MMOL/L (ref 3–16)
AST SERPL-CCNC: 38 U/L (ref 15–37)
BILIRUB SERPL-MCNC: 0.5 MG/DL (ref 0–1)
BUN BLDV-MCNC: 18 MG/DL (ref 7–20)
CALCIUM SERPL-MCNC: 9.2 MG/DL (ref 8.3–10.6)
CHLORIDE BLD-SCNC: 103 MMOL/L (ref 99–110)
CO2: 26 MMOL/L (ref 21–32)
CREAT SERPL-MCNC: 1 MG/DL (ref 0.9–1.3)
GFR AFRICAN AMERICAN: >60
GFR NON-AFRICAN AMERICAN: >60
GLOBULIN: 4.5 G/DL
GLUCOSE BLD-MCNC: 88 MG/DL (ref 70–99)
POTASSIUM SERPL-SCNC: 3.5 MMOL/L (ref 3.5–5.1)
SODIUM BLD-SCNC: 141 MMOL/L (ref 136–145)
TOTAL PROTEIN: 8.3 G/DL (ref 6.4–8.2)

## 2021-10-22 PROCEDURE — G8417 CALC BMI ABV UP PARAM F/U: HCPCS | Performed by: NURSE PRACTITIONER

## 2021-10-22 PROCEDURE — 99214 OFFICE O/P EST MOD 30 MIN: CPT | Performed by: NURSE PRACTITIONER

## 2021-10-22 PROCEDURE — 1036F TOBACCO NON-USER: CPT | Performed by: NURSE PRACTITIONER

## 2021-10-22 PROCEDURE — G8427 DOCREV CUR MEDS BY ELIG CLIN: HCPCS | Performed by: NURSE PRACTITIONER

## 2021-10-22 PROCEDURE — 3017F COLORECTAL CA SCREEN DOC REV: CPT | Performed by: NURSE PRACTITIONER

## 2021-10-22 PROCEDURE — G8484 FLU IMMUNIZE NO ADMIN: HCPCS | Performed by: NURSE PRACTITIONER

## 2021-10-22 RX ORDER — HYDROCHLOROTHIAZIDE 25 MG/1
25 TABLET ORAL EVERY MORNING
Qty: 30 TABLET | Refills: 1 | Status: SHIPPED | OUTPATIENT
Start: 2021-10-22 | End: 2021-11-15

## 2021-10-22 ASSESSMENT — ENCOUNTER SYMPTOMS
VOMITING: 0
COUGH: 0
DIARRHEA: 0
CHEST TIGHTNESS: 0
SHORTNESS OF BREATH: 0
NAUSEA: 0
CONSTIPATION: 0
BACK PAIN: 1

## 2021-10-22 NOTE — PROGRESS NOTES
right foot    BWC Contusion of right foot    Complex regional pain syndrome type 1 of right lower extremity    Essential hypertension    Obesity, Class II, BMI 35-39.9    Hypercholesterolemia    Sebaceous cyst    Elevated ALT measurement          Current Outpatient Medications   Medication Sig Dispense Refill    amLODIPine (NORVASC) 10 MG tablet TAKE 1 TABLET BY MOUTH EVERY DAY 90 tablet 0    diclofenac (VOLTAREN) 75 MG EC tablet TAKE 1 TABLET BY MOUTH TWICE A DAY 60 tablet 3    atorvastatin (LIPITOR) 40 MG tablet TAKE 1 TABLET BY MOUTH EVERY DAY 90 tablet 1    LYRICA 100 MG capsule Take 100 mg by mouth 3 times daily.  traMADol (ULTRAM) 50 MG tablet Take 50 mg by mouth 3 times daily.  Blood Pressure Monitoring (BLOOD PRESSURE CUFF) MISC Use to check BP daily 1 each 0     No current facility-administered medications for this visit. Allergies   Allergen Reactions    Lyrica [Pregabalin] Hives    Cymbalta [Duloxetine Hcl] Itching and Rash       Review of Systems   Constitutional: Negative for activity change and fever. Respiratory: Negative for cough, chest tightness and shortness of breath. Cardiovascular: Negative for chest pain, palpitations and leg swelling. Gastrointestinal: Negative for constipation, diarrhea, nausea and vomiting. Musculoskeletal: Positive for arthralgias, back pain and myalgias. Neurological: Negative for dizziness, syncope, weakness and headaches. Psychiatric/Behavioral: Negative for confusion. Vitals:    10/22/21 0808 10/22/21 0831   BP: (!) 150/78 (!) 142/76   Site: Right Upper Arm Right Upper Arm   Position: Sitting Sitting   Cuff Size: Large Adult Large Adult   Pulse: 82    SpO2: 98%    Weight: 247 lb 4 oz (112.2 kg)    Height: 5' 9\" (1.753 m)        Body mass index is 36.51 kg/m².      Wt Readings from Last 3 Encounters:   10/22/21 247 lb 4 oz (112.2 kg)   05/24/21 224 lb (101.6 kg)   04/16/21 221 lb 9.6 oz (100.5 kg)       BP Readings from Last 3 Encounters:   10/22/21 (!) 150/78   04/16/21 126/74   03/19/21 (!) 140/90       Physical Exam  Vitals and nursing note reviewed. Constitutional:       General: He is not in acute distress. Appearance: He is well-developed. He is obese. HENT:      Head: Normocephalic and atraumatic. Cardiovascular:      Rate and Rhythm: Normal rate and regular rhythm. Heart sounds: Normal heart sounds. No murmur heard. No friction rub. No gallop. Pulmonary:      Effort: Pulmonary effort is normal. No respiratory distress. Breath sounds: Normal breath sounds. Musculoskeletal:      Cervical back: Neck supple. Right lower leg: No edema. Left lower leg: No edema. Skin:     General: Skin is warm and dry. Neurological:      Mental Status: He is alert and oriented to person, place, and time. Psychiatric:         Behavior: Behavior normal.         Thought Content: Thought content normal.         Judgment: Judgment normal.         Assessmentand Plan  Oly Segovia was seen today for hypertension. Diagnoses and all orders for this visit:    Essential hypertension: uncontrolled   -     amLODIPine (NORVASC) 10 MG tablet; TAKE 1 TABLET BY MOUTH EVERY DAY  - add HCTZ 25 mg daily. Side effects of medication discussed. Advised to work on aerobic exercise and weight loss. Hypercholesterolemia  Started on atorvastatin 40 mg daily 7/2020  Repeat lipids improved 3/2021  Advised to work on diet, aerobic exercise, and weight loss. Obesity: weight has increased from last visit  Diet, aerobic exercise, and weight loss discussed and needed. Elevated ALT measurement  -CMP  Repeat lab. Advised to avoid alcohol with his pain medications. Discussed the importance of weight loss. Colon cancer screening  -     POCT Fecal Immunochemical Test (FIT); Future  Declines screening colonoscopy. He reports that he will complete FIT.      Return in about 4 weeks (around 11/19/2021), or if symptoms worsen or fail to improve, for blood pressure.

## 2021-10-26 DIAGNOSIS — R74.8 ELEVATED LIVER ENZYMES: Primary | ICD-10-CM

## 2021-10-28 ENCOUNTER — HOSPITAL ENCOUNTER (OUTPATIENT)
Dept: ULTRASOUND IMAGING | Age: 56
Discharge: HOME | End: 2021-10-28
Payer: COMMERCIAL

## 2021-10-28 DIAGNOSIS — R74.8 ELEVATED LIVER ENZYMES: ICD-10-CM

## 2021-10-28 PROCEDURE — 76700 US EXAM ABDOM COMPLETE: CPT

## 2021-11-01 DIAGNOSIS — K76.0 FATTY LIVER: ICD-10-CM

## 2021-11-01 DIAGNOSIS — R74.8 ELEVATED LIVER ENZYMES: ICD-10-CM

## 2021-11-01 DIAGNOSIS — K76.0 FATTY LIVER: Primary | ICD-10-CM

## 2021-11-04 DIAGNOSIS — Z12.11 COLON CANCER SCREENING: ICD-10-CM

## 2021-11-04 LAB
CONTROL: NORMAL
HEMOCCULT STL QL: NEGATIVE

## 2021-11-04 PROCEDURE — 82274 ASSAY TEST FOR BLOOD FECAL: CPT | Performed by: NURSE PRACTITIONER

## 2021-11-13 DIAGNOSIS — I10 ESSENTIAL HYPERTENSION: ICD-10-CM

## 2021-11-15 RX ORDER — HYDROCHLOROTHIAZIDE 25 MG/1
25 TABLET ORAL EVERY MORNING
Qty: 30 TABLET | Refills: 1 | Status: SHIPPED | OUTPATIENT
Start: 2021-11-15 | End: 2021-12-07

## 2021-11-19 ENCOUNTER — OFFICE VISIT (OUTPATIENT)
Dept: FAMILY MEDICINE CLINIC | Age: 56
End: 2021-11-19
Payer: MEDICARE

## 2021-11-19 VITALS
HEIGHT: 69 IN | WEIGHT: 250 LBS | SYSTOLIC BLOOD PRESSURE: 146 MMHG | BODY MASS INDEX: 37.03 KG/M2 | DIASTOLIC BLOOD PRESSURE: 92 MMHG | OXYGEN SATURATION: 95 % | HEART RATE: 82 BPM | RESPIRATION RATE: 18 BRPM

## 2021-11-19 DIAGNOSIS — R74.8 ELEVATED LIVER ENZYMES: ICD-10-CM

## 2021-11-19 DIAGNOSIS — I10 ESSENTIAL HYPERTENSION: Primary | ICD-10-CM

## 2021-11-19 DIAGNOSIS — E78.00 HYPERCHOLESTEROLEMIA: ICD-10-CM

## 2021-11-19 DIAGNOSIS — K76.0 FATTY LIVER: ICD-10-CM

## 2021-11-19 DIAGNOSIS — E66.9 OBESITY, CLASS II, BMI 35-39.9: ICD-10-CM

## 2021-11-19 PROCEDURE — 99214 OFFICE O/P EST MOD 30 MIN: CPT | Performed by: NURSE PRACTITIONER

## 2021-11-19 ASSESSMENT — ENCOUNTER SYMPTOMS
CHEST TIGHTNESS: 0
SHORTNESS OF BREATH: 0
VOMITING: 0
NAUSEA: 0
BACK PAIN: 1
CONSTIPATION: 0
DIARRHEA: 0
COUGH: 0

## 2021-11-19 NOTE — PROGRESS NOTES
2021    This is a 60 y.o. male   Chief Complaint   Patient presents with    Follow-up     4 week follow up. Pt is following up on blood pressure. Keyshawn ALLEN  Hypertension:  Home blood pressure monitorin-150/80s. He is not adherent to a low sodium diet. Patient denies chest pain, shortness of breath, headache, peripheral edema and palpitations. Antihypertensive medication side effects: no medication side effects noted. Patient was confused last visit. He has been taking his amlodipine one day and then alternating with HCTZ the other day. Sodium (mmol/L)   Date Value   10/22/2021 141    BUN (mg/dL)   Date Value   10/22/2021 18    Glucose (mg/dL)   Date Value   10/22/2021 88      Potassium (mmol/L)   Date Value   10/22/2021 3.5    CREATININE (mg/dL)   Date Value   10/22/2021 1.0         Hyperlipidemia:  No new myalgias or GI upset on atorvastatin (Lipitor). Medication compliance: compliant most of the time. Patient is not following a low fat, low cholesterol diet. He is not exercising regularly. Lab Results   Component Value Date    HDL 50 2021    LDLCALC 129 (H) 2021     Lab Results   Component Value Date    ALT 68 (H) 10/22/2021    AST 38 (H) 10/22/2021        Obesity- no routine diet or exercise. Continues to follow with pain management [facility] for chronic pain in right foot and low back pain. Prescribed lyrica TID and tramadol TID from pain specialist.  Patient reports that he can take brand-name Lyrica but not able to take the generic due to allergy. Patient reports these pain medications help decrease his pain slightly. Had epidural injection in low back without improvement in pain. Has not had another epidural injection. Last blood work showed elevated liver enzymes. U/s showed fatty liver. He has not yet made appt with GI. Reports that he drinks about 4 beers and 3 shots of liquor per week.      Patient Active Problem List   Diagnosis    BWC Fracture of phalanx of right foot, open    BWC Open fracture of phalanx of lesser toe of right foot    BWC Sprain of right foot    BWC Crushing injury of right foot    BWC Contusion of right foot    Complex regional pain syndrome type 1 of right lower extremity    Essential hypertension    Obesity, Class II, BMI 35-39.9    Hypercholesterolemia    Sebaceous cyst    Elevated ALT measurement    Elevated liver enzymes    Fatty liver- u/s 10/2021          Current Outpatient Medications   Medication Sig Dispense Refill    hydroCHLOROthiazide (HYDRODIURIL) 25 MG tablet TAKE 1 TABLET BY MOUTH EVERY MORNING FOR HIGH BLOOD PRESSURE 30 tablet 1    amLODIPine (NORVASC) 10 MG tablet TAKE 1 TABLET BY MOUTH EVERY DAY 90 tablet 0    diclofenac (VOLTAREN) 75 MG EC tablet TAKE 1 TABLET BY MOUTH TWICE A DAY 60 tablet 3    atorvastatin (LIPITOR) 40 MG tablet TAKE 1 TABLET BY MOUTH EVERY DAY 90 tablet 1    LYRICA 100 MG capsule Take 100 mg by mouth 3 times daily.  traMADol (ULTRAM) 50 MG tablet Take 50 mg by mouth 3 times daily.  Blood Pressure Monitoring (BLOOD PRESSURE CUFF) MISC Use to check BP daily 1 each 0     No current facility-administered medications for this visit. Allergies   Allergen Reactions    Lyrica [Pregabalin] Hives    Cymbalta [Duloxetine Hcl] Itching and Rash       Review of Systems   Constitutional: Negative for activity change and fever. Respiratory: Negative for cough, chest tightness and shortness of breath. Cardiovascular: Negative for chest pain, palpitations and leg swelling. Gastrointestinal: Negative for constipation, diarrhea, nausea and vomiting. Musculoskeletal: Positive for arthralgias, back pain and myalgias. Neurological: Negative for dizziness, syncope, weakness and headaches. Psychiatric/Behavioral: Negative for confusion.        Vitals:    11/19/21 0813 11/19/21 0936   BP: (!) 140/80 (!) 146/92   Site: Right Upper Arm    Position: Sitting    Cuff Size: Large Adult    Pulse: 82    Resp: 18    SpO2: 95%    Weight: 250 lb (113.4 kg)    Height: 5' 9\" (1.753 m)        Body mass index is 36.92 kg/m². Wt Readings from Last 3 Encounters:   11/19/21 250 lb (113.4 kg)   10/22/21 247 lb 4 oz (112.2 kg)   05/24/21 224 lb (101.6 kg)       BP Readings from Last 3 Encounters:   11/19/21 (!) 140/80   10/22/21 (!) 142/76   04/16/21 126/74       Physical Exam  Vitals and nursing note reviewed. Constitutional:       General: He is not in acute distress. Appearance: He is well-developed. He is obese. HENT:      Head: Normocephalic and atraumatic. Cardiovascular:      Rate and Rhythm: Normal rate and regular rhythm. Heart sounds: Normal heart sounds. No murmur heard. No friction rub. No gallop. Pulmonary:      Effort: Pulmonary effort is normal. No respiratory distress. Breath sounds: Normal breath sounds. Musculoskeletal:      Cervical back: Neck supple. Right lower leg: No edema. Left lower leg: No edema. Skin:     General: Skin is warm and dry. Neurological:      Mental Status: He is alert and oriented to person, place, and time. Psychiatric:         Behavior: Behavior normal.         Thought Content: Thought content normal.         Judgment: Judgment normal.         Assessmentand Plan  Kandis Finnegan was seen today for hypertension. Diagnoses and all orders for this visit:    Essential hypertension: uncontrolled   -     amLODIPine (NORVASC) 10 MG tablet; TAKE 1 TABLET BY MOUTH EVERY DAY  - add HCTZ 25 mg daily. Side effects of medication discussed. Advised to work on aerobic exercise and weight loss. Discussed that he needs to take both medications daily     Hypercholesterolemia  Started on atorvastatin 40 mg daily 7/2020  Repeat lipids improved 3/2021  Advised to work on diet, aerobic exercise, and weight loss.      Obesity: weight has increased from last visit  Diet, aerobic exercise, and weight loss discussed and needed. Elevated liver enzymes/ fatty liver   Advised to avoid alcohol. Discussed the importance of diet and weight loss. Advised again to make appt with GI for evaluation. Return in about 4 weeks (around 12/17/2021), or if symptoms worsen or fail to improve, for blood pressure.

## 2021-12-07 DIAGNOSIS — I10 ESSENTIAL HYPERTENSION: ICD-10-CM

## 2021-12-07 RX ORDER — HYDROCHLOROTHIAZIDE 25 MG/1
25 TABLET ORAL EVERY MORNING
Qty: 30 TABLET | Refills: 1 | Status: SHIPPED | OUTPATIENT
Start: 2021-12-07 | End: 2021-12-20 | Stop reason: SDUPTHER

## 2021-12-17 ENCOUNTER — OFFICE VISIT (OUTPATIENT)
Dept: FAMILY MEDICINE CLINIC | Age: 56
End: 2021-12-17
Payer: MEDICARE

## 2021-12-17 VITALS
DIASTOLIC BLOOD PRESSURE: 80 MMHG | HEART RATE: 73 BPM | BODY MASS INDEX: 37.18 KG/M2 | WEIGHT: 251 LBS | SYSTOLIC BLOOD PRESSURE: 126 MMHG | HEIGHT: 69 IN | RESPIRATION RATE: 16 BRPM | OXYGEN SATURATION: 98 %

## 2021-12-17 DIAGNOSIS — M17.0 ARTHRITIS OF BOTH KNEES: ICD-10-CM

## 2021-12-17 DIAGNOSIS — E78.00 HYPERCHOLESTEROLEMIA: ICD-10-CM

## 2021-12-17 DIAGNOSIS — R74.8 ELEVATED LIVER ENZYMES: ICD-10-CM

## 2021-12-17 DIAGNOSIS — E66.9 OBESITY, CLASS II, BMI 35-39.9: ICD-10-CM

## 2021-12-17 DIAGNOSIS — I10 ESSENTIAL HYPERTENSION: ICD-10-CM

## 2021-12-17 DIAGNOSIS — K76.0 FATTY LIVER: ICD-10-CM

## 2021-12-17 DIAGNOSIS — I10 ESSENTIAL HYPERTENSION: Primary | ICD-10-CM

## 2021-12-17 LAB
ALBUMIN SERPL-MCNC: 4.2 G/DL (ref 3.4–5)
ALP BLD-CCNC: 74 U/L (ref 40–129)
ALT SERPL-CCNC: 32 U/L (ref 10–40)
AST SERPL-CCNC: 23 U/L (ref 15–37)
BILIRUB SERPL-MCNC: 0.3 MG/DL (ref 0–1)
BILIRUBIN DIRECT: <0.2 MG/DL (ref 0–0.3)
BILIRUBIN, INDIRECT: NORMAL MG/DL (ref 0–1)
TOTAL PROTEIN: 8 G/DL (ref 6.4–8.2)

## 2021-12-17 PROCEDURE — 99214 OFFICE O/P EST MOD 30 MIN: CPT | Performed by: NURSE PRACTITIONER

## 2021-12-17 RX ORDER — HYDROCODONE BITARTRATE AND ACETAMINOPHEN 5; 325 MG/1; MG/1
1 TABLET ORAL EVERY 8 HOURS PRN
COMMUNITY
Start: 2021-11-16

## 2021-12-17 RX ORDER — AMLODIPINE BESYLATE 10 MG/1
TABLET ORAL
Qty: 90 TABLET | Refills: 1 | Status: SHIPPED | OUTPATIENT
Start: 2021-12-17 | End: 2022-07-18 | Stop reason: SDUPTHER

## 2021-12-17 RX ORDER — BACLOFEN 20 MG/1
TABLET ORAL
COMMUNITY
Start: 2021-11-10

## 2021-12-17 ASSESSMENT — ENCOUNTER SYMPTOMS
VOMITING: 0
NAUSEA: 0
DIARRHEA: 0
SHORTNESS OF BREATH: 0
CHEST TIGHTNESS: 0
BACK PAIN: 1
CONSTIPATION: 0
COUGH: 0

## 2021-12-17 NOTE — PROGRESS NOTES
2021    This is a 60 y.o. male   Chief Complaint   Patient presents with    Hypertension     pt has been checking bp at home and it's been ok he said it fluctuates. hes been taking meds and eating veggies. he wants to work out but his knees wont let him. his knees are very very sore    . HPI  Hypertension:  Home blood pressure monitorin-140/70-80s. He is not adherent to a low sodium diet. Patient denies chest pain, shortness of breath, headache, peripheral edema and palpitations. Antihypertensive medication side effects: no medication side effects noted. Sodium (mmol/L)   Date Value   10/22/2021 141    BUN (mg/dL)   Date Value   10/22/2021 18    Glucose (mg/dL)   Date Value   10/22/2021 88      Potassium (mmol/L)   Date Value   10/22/2021 3.5    CREATININE (mg/dL)   Date Value   10/22/2021 1.0         Hyperlipidemia:  No new myalgias or GI upset on atorvastatin (Lipitor). Medication compliance: compliant most of the time. Patient is not following a low fat, low cholesterol diet. He is not exercising regularly. Lab Results   Component Value Date    HDL 50 2021    LDLCALC 129 (H) 2021     Lab Results   Component Value Date    ALT 68 (H) 10/22/2021    AST 38 (H) 10/22/2021        Obesity- no routine diet or exercise. Continues to follow with pain management [facility] for chronic pain in right foot and low back pain. Prescribed lyrica TID and norco TID from pain specialist.  Patient reports that he can take brand-name Lyrica but not able to take the generic due to allergy. Patient reports these pain medications help decrease his pain slightly. Had epidural injection in low back without improvement in pain. Has not had another epidural injection. Has not had f/u with ortho for arianna knee arthritis. Tried cortisone injection in the past without improvement in symptoms. Last blood work showed elevated liver enzymes. U/s showed fatty liver. Reports that he has GI evaluation next month. Reports that he has cut down his alcohol drinks to 3 per week. Patient Active Problem List   Diagnosis    BWC Fracture of phalanx of right foot, open    BWC Open fracture of phalanx of lesser toe of right foot    BWC Sprain of right foot    BWC Crushing injury of right foot    BWC Contusion of right foot    Complex regional pain syndrome type 1 of right lower extremity    Essential hypertension    Obesity, Class II, BMI 35-39.9    Hypercholesterolemia    Sebaceous cyst    Elevated liver enzymes    Fatty liver- u/s 10/2021          Current Outpatient Medications   Medication Sig Dispense Refill    baclofen (LIORESAL) 20 MG tablet       HYDROcodone-acetaminophen (NORCO) 5-325 MG per tablet Take 1 tablet by mouth every 8 hours as needed.  hydroCHLOROthiazide (HYDRODIURIL) 25 MG tablet TAKE 1 TABLET BY MOUTH EVERY MORNING FOR HIGH BLOOD PRESSURE 30 tablet 1    amLODIPine (NORVASC) 10 MG tablet TAKE 1 TABLET BY MOUTH EVERY DAY 90 tablet 0    diclofenac (VOLTAREN) 75 MG EC tablet TAKE 1 TABLET BY MOUTH TWICE A DAY 60 tablet 3    atorvastatin (LIPITOR) 40 MG tablet TAKE 1 TABLET BY MOUTH EVERY DAY 90 tablet 1    LYRICA 100 MG capsule Take 100 mg by mouth 3 times daily.  Blood Pressure Monitoring (BLOOD PRESSURE CUFF) MISC Use to check BP daily 1 each 0     No current facility-administered medications for this visit. Allergies   Allergen Reactions    Lyrica [Pregabalin] Hives    Cymbalta [Duloxetine Hcl] Itching and Rash       Review of Systems   Constitutional: Negative for activity change and fever. Respiratory: Negative for cough, chest tightness and shortness of breath. Cardiovascular: Negative for chest pain, palpitations and leg swelling. Gastrointestinal: Negative for constipation, diarrhea, nausea and vomiting. Musculoskeletal: Positive for arthralgias, back pain and myalgias.    Neurological: Negative for dizziness, syncope, weakness and headaches. Psychiatric/Behavioral: Negative for confusion. Vitals:    12/17/21 0802   BP: 126/80   Site: Right Upper Arm   Position: Sitting   Cuff Size: Large Adult   Pulse: 73   Resp: 16   SpO2: 98%   Weight: 251 lb (113.9 kg)   Height: 5' 9\" (1.753 m)       Body mass index is 37.07 kg/m². Wt Readings from Last 3 Encounters:   12/17/21 251 lb (113.9 kg)   11/19/21 250 lb (113.4 kg)   10/22/21 247 lb 4 oz (112.2 kg)       BP Readings from Last 3 Encounters:   12/17/21 126/80   11/19/21 (!) 146/92   10/22/21 (!) 142/76       Physical Exam  Vitals and nursing note reviewed. Constitutional:       General: He is not in acute distress. Appearance: He is well-developed. He is obese. HENT:      Head: Normocephalic and atraumatic. Cardiovascular:      Rate and Rhythm: Normal rate and regular rhythm. Heart sounds: Normal heart sounds. No murmur heard. No friction rub. No gallop. Pulmonary:      Effort: Pulmonary effort is normal. No respiratory distress. Breath sounds: Normal breath sounds. Musculoskeletal:      Cervical back: Neck supple. Right lower leg: No edema. Left lower leg: No edema. Skin:     General: Skin is warm and dry. Neurological:      Mental Status: He is alert and oriented to person, place, and time. Psychiatric:         Behavior: Behavior normal.         Thought Content: Thought content normal.         Judgment: Judgment normal.         Assessmentand Plan  Dayanara Brasher was seen today for hypertension. Diagnoses and all orders for this visit:    Essential hypertension: controlled   -     amLODIPine (NORVASC) 10 MG tablet; TAKE 1 TABLET BY MOUTH EVERY DAY  - HCTZ 25 mg daily. Advised to work on aerobic exercise and weight loss.    Discussed that he needs to take both medications daily     Hypercholesterolemia  Started on atorvastatin 40 mg daily 7/2020  Repeat lipids improved 3/2021  Advised to work on diet, aerobic exercise, and weight loss. Obesity: weight has increased from last visit  Diet, aerobic exercise, and weight loss discussed and needed. Elevated liver enzymes/ fatty liver   Repeat liver function   Advised to avoid alcohol. Discussed the importance of diet and weight loss. Has appt with GI next month for further evaluation. Arthritis of both knees  -     Keren Arriaga MD, Orthopedic Surgery, [address] [place] [address] to have another f/u with ortho to discuss treatment options. Return in about 4 months (around 4/17/2022), or if symptoms worsen or fail to improve, for chronic conditions.

## 2021-12-20 DIAGNOSIS — I10 ESSENTIAL HYPERTENSION: ICD-10-CM

## 2021-12-20 RX ORDER — HYDROCHLOROTHIAZIDE 25 MG/1
25 TABLET ORAL EVERY MORNING
Qty: 30 TABLET | Refills: 5 | Status: SHIPPED | OUTPATIENT
Start: 2021-12-20 | End: 2022-06-13

## 2021-12-20 NOTE — TELEPHONE ENCOUNTER
----- Message from Stacy Lambert sent at 12/18/2021 12:06 PM EST -----  Subject: Refill Request    QUESTIONS  Name of Medication? hydroCHLOROthiazide (HYDRODIURIL) 25 MG tablet  Patient-reported dosage and instructions? 1 tablet daily  How many days do you have left? 1  Preferred Pharmacy? [id number] Pharmacy phone number (if available)? [phone]  ---------------------------------------------------------------------------  --------------,  Name of Medication? amLODIPine (NORVASC) 10 MG tablet  Patient-reported dosage and instructions? 1 tablet daily  How many days do you have left? 1  Preferred Pharmacy? [id number] Pharmacy phone number (if available)? [phone]  ---------------------------------------------------------------------------  --------------  Stef BERMEO  What is the best way for the office to contact you? OK to leave message on   voicemail  Preferred Call Back Phone Number?  [phone]

## 2022-01-06 ENCOUNTER — OFFICE VISIT (OUTPATIENT)
Dept: ORTHOPEDIC SURGERY | Age: 57
End: 2022-01-06
Payer: MEDICARE

## 2022-01-06 VITALS — BODY MASS INDEX: 37.18 KG/M2 | WEIGHT: 251 LBS | HEIGHT: 69 IN | RESPIRATION RATE: 18 BRPM

## 2022-01-06 DIAGNOSIS — M17.0 PRIMARY OSTEOARTHRITIS OF BOTH KNEES: Primary | ICD-10-CM

## 2022-01-06 PROCEDURE — 20610 DRAIN/INJ JOINT/BURSA W/O US: CPT | Performed by: ORTHOPAEDIC SURGERY

## 2022-01-07 NOTE — PROGRESS NOTES
[facility]  Office Visit    Chief Complaint: Bilateral knee pain    HPI:  Jose C Montes De Oca is a 60 y.o. who is here in follow-up of bilateral knee pain. He has been seen a few times in the past year for steroid injections and viscosupplementation injections. He reports he had more relief with the viscosupplementation injections than the steroid injections. He reports good relief of symptoms but starting to have pain on a daily basis again. He feels the pain both medially and anterior in both knees. Walking for prolonged times or getting off of off of the floor has pain. He also has stiffness after being seated for long period of time. There is no history of injury or surgery. He takes diclofenac for pain. Patient Active Problem List   Diagnosis    BWC Fracture of phalanx of right foot, open    BWC Open fracture of phalanx of lesser toe of right foot    BWC Sprain of right foot    BWC Crushing injury of right foot    BWC Contusion of right foot    Complex regional pain syndrome type 1 of right lower extremity    Essential hypertension    Obesity, Class II, BMI 35-39.9    Hypercholesterolemia    Sebaceous cyst    Elevated liver enzymes    Fatty liver- u/s 10/2021       ROS:  Constitutional: denies fever, chills, weight loss  MSK: denies pain in other joints, muscle aches  Neurological: denies numbness, tingling, weakness    Exam:  Resp. rate 18, height 5' 9\" (1.753 m), weight 251 lb (113.9 kg). Appearance: sitting in exam room chair, appears to be in no acute distress, awake and alert  Resp: unlabored breathing on room air  Skin: warm, dry and intact with out erythema or significant increased temperature  Neuro: grossly intact both lower extremities. Intact sensation to light touch. Motor exam 4+ to 5/5 in all major motor groups. Bilateral knees: Examination reveals that knee range of motion is 0 to 130 degrees.   There is varus deformity, positive crepitus, positive joint line tenderness, positive antalgic gait. Neurologically, plantar flexion and dorsiflexion is intact. Pulses palpable distally. 5/5 strength. Imaging:  3 views of bilateral knees were performed and interpreted today. These are significant for bone-on-bone osteoarthritis of bilateral medial compartments with tricompartmental degenerative changes throughout both knees. Assessment:  Bilateral knee osteoarthritis    Plan:  We discussed the diagnosis and treatment options. He had mild relief of symptoms with a steroid injection in March and recent viscosupplementation. We briefly discussed total knee arthroplasty. He would like to continue nonoperative management of his bilateral knee osteoarthritis at this time. He is interested in repeat viscosupplementation injections. The 1st of a series of 3 Euflexxa injections was performed today as described below. He will follow up next week for his 2nd injections. Procedure:  After verbal consent was obtained, bilateral knees were prepped with alcohol and anesthetized with ethyl chloride. Each knee joint was then injected under sterile technique with the prepackaged dose of Euflexxa. Bandages were applied. The patient tolerated the procedure well and there were no complications. Total time spent on today's encounter was at least 24 minutes. This time included reviewing prior notes, radiographs, and lab results when available, reviewing history obtained by medical assistant, performing history and physical exam, reviewing tests/radiographs with the patient, counseling the patient, ordering medications or tests, documentation in the electronic health record, and coordination of care. This dictation was done with Dragon dictation and may contain mechanical errors related to translation.

## 2022-01-10 DIAGNOSIS — E78.00 HYPERCHOLESTEROLEMIA: ICD-10-CM

## 2022-01-10 RX ORDER — ATORVASTATIN CALCIUM 40 MG/1
TABLET, FILM COATED ORAL
Qty: 90 TABLET | Refills: 1 | Status: SHIPPED | OUTPATIENT
Start: 2022-01-10 | End: 2022-07-07

## 2022-01-13 ENCOUNTER — OFFICE VISIT (OUTPATIENT)
Dept: ORTHOPEDIC SURGERY | Age: 57
End: 2022-01-13
Payer: MEDICARE

## 2022-01-13 DIAGNOSIS — M17.0 PRIMARY OSTEOARTHRITIS OF BOTH KNEES: Primary | ICD-10-CM

## 2022-01-13 PROCEDURE — 20610 DRAIN/INJ JOINT/BURSA W/O US: CPT | Performed by: PHYSICIAN ASSISTANT

## 2022-01-13 RX ORDER — HYALURONATE SODIUM 10 MG/ML
20 SYRINGE (ML) INTRAARTICULAR ONCE
Status: COMPLETED | OUTPATIENT
Start: 2022-01-13 | End: 2022-01-13

## 2022-01-13 RX ADMIN — Medication 20 MG: at 14:16

## 2022-01-13 NOTE — PROGRESS NOTES
Subjective:    He  is here for visco supplementation, Euflexxa injection # 2 for the left and right knee. Objective: There were no vitals taken for this visit. There is no obvious deformity. There is minimal joint effusion. There is mild pain with range of motion testing. There is no significant  instability noted. Assessment:    ICD-10-CM    1. Primary osteoarthritis of both knees  M17.0 SC ARTHROCENTESIS ASPIR&/INJ MAJOR JT/BURSA W/O US     sodium hyaluronate (EUFLEXXA, HYALGAN) injection 20 mg     sodium hyaluronate (EUFLEXXA, HYALGAN) injection 20 mg       Plan:    Risk and benefits of viscosupplementation injections were discussed today. Risks include but not limited to increased pain, bleeding, infection, failure to provide improvement, neurovascular injury. Proceed with injection #2 in the series of 3 injections for the left and right knee. PROCEDURE NOTE:  PRE-PROCEDURE DIAGNOSIS: DJD knee  POST-PROCEDURE DIAGNOSIS: DJD knee  With the Leigh Severin permission, his left and right knee was prepped in standard sterile fashion with  Alcohol. The prefilled injection of the visco supplementation ( Euflexxa 20 mg/ 2 ML ) was injected into the  anterior-lateral joint space compartment without difficulty. He tolerated the procedure well without difficulty. A band-aid was applied. POST-PROCEDURE INSTRUCTIONS GIVEN TO PATIENT:   He was advised to ice the knee for 15-20 minutes to relieve any injection site related pain. Decrease activity for the next 24 to 48 hours. May use prescription or OTC pain relievers as needed    FOLLOW-UP: 1 week. As directed or call or return to clinic if these symptoms worsen or fail to improve as anticipated. If at any time you are concerned you may contact the office for further instructions or care.

## 2022-01-20 ENCOUNTER — OFFICE VISIT (OUTPATIENT)
Dept: ORTHOPEDIC SURGERY | Age: 57
End: 2022-01-20
Payer: MEDICARE

## 2022-01-20 VITALS — HEIGHT: 69 IN | WEIGHT: 251 LBS | BODY MASS INDEX: 37.18 KG/M2 | RESPIRATION RATE: 16 BRPM

## 2022-01-20 DIAGNOSIS — M17.0 PRIMARY OSTEOARTHRITIS OF BOTH KNEES: Primary | ICD-10-CM

## 2022-01-20 DIAGNOSIS — E66.01 SEVERE OBESITY (BMI 35.0-35.9 WITH COMORBIDITY) (HCC): ICD-10-CM

## 2022-01-20 PROCEDURE — 20610 DRAIN/INJ JOINT/BURSA W/O US: CPT | Performed by: PHYSICIAN ASSISTANT

## 2022-01-20 RX ORDER — HYALURONATE SODIUM 10 MG/ML
20 SYRINGE (ML) INTRAARTICULAR ONCE
Status: COMPLETED | OUTPATIENT
Start: 2022-01-20 | End: 2022-01-20

## 2022-01-20 RX ADMIN — Medication 20 MG: at 10:58

## 2022-01-20 NOTE — PROGRESS NOTES
This dictation was done with [employer] dictation and may contain mechanical errors related to translation. Resp. rate 16, height 5' 9\" (1.753 m), weight 251 lb (113.9 kg). This is a pleasant 70-year-old gentleman who has osteoarthritis in both of his knees. He has had minimal relief with the cortisone shots and has had 2 of the 3 Euflexxa injections already without significant improvement. On examination today he has medial joint line tenderness 1+ edema with crepitus during flexion extension through both knees. His quad atrophy is minimal skin good distal pulses good dorsiflexion plantarflexion strength.     Impression is bilateral knee osteoarthritis    With his consent he was injected with the Euflexxa injection into the left into the right knee joint he tolerated it well we went through postinjection activities and he will follow-up with us in 6 weeks as needed

## 2022-02-24 ENCOUNTER — OFFICE VISIT (OUTPATIENT)
Dept: ORTHOPEDIC SURGERY | Age: 57
End: 2022-02-24
Payer: MEDICARE

## 2022-02-24 VITALS — WEIGHT: 260 LBS | HEIGHT: 69 IN | BODY MASS INDEX: 38.51 KG/M2

## 2022-02-24 DIAGNOSIS — M17.0 PRIMARY OSTEOARTHRITIS OF BOTH KNEES: Primary | ICD-10-CM

## 2022-02-24 PROCEDURE — G8427 DOCREV CUR MEDS BY ELIG CLIN: HCPCS | Performed by: PHYSICIAN ASSISTANT

## 2022-02-24 PROCEDURE — 99213 OFFICE O/P EST LOW 20 MIN: CPT | Performed by: PHYSICIAN ASSISTANT

## 2022-02-24 PROCEDURE — G8484 FLU IMMUNIZE NO ADMIN: HCPCS | Performed by: PHYSICIAN ASSISTANT

## 2022-02-24 PROCEDURE — 3017F COLORECTAL CA SCREEN DOC REV: CPT | Performed by: PHYSICIAN ASSISTANT

## 2022-02-24 PROCEDURE — G8417 CALC BMI ABV UP PARAM F/U: HCPCS | Performed by: PHYSICIAN ASSISTANT

## 2022-02-24 PROCEDURE — 1036F TOBACCO NON-USER: CPT | Performed by: PHYSICIAN ASSISTANT

## 2022-02-27 NOTE — PROGRESS NOTES
Subjective:      Patient ID: Leigh Severin is a 60 y.o.  male. Chief Complaint   Patient presents with    Follow-up     Bilateral knee        HPI: He is here for follow up on left and right knee. He states symptoms have not improved with the recent cortisone injection performed on 3/22/2021 and more recent Euflexxa viscosupplementation series of 3 1/20/2022. He reports minimal to no improvement with these injections. .   Pain is on average 10/10. Pain is worse with increased activity/ weight bearing. Pain improves with rest/ elevation. Review of Systems:   Negative for fever or chills. Negative for significant numbness or tingling in lower extremity. Past Medical History:   Diagnosis Date    HTN (hypertension)     Hyperlipidemia        Family History   Problem Relation Age of Onset    No Known Problems Mother     No Known Problems Father        Past Surgical History:   Procedure Laterality Date    DENTAL SURGERY      FACIAL SURGERY  2005    KNEE SURGERY Left 2010    NECK SURGERY N/A 8/3/2020    REMOVAL OF SEBACEOUS CYST INFERIOR POSTERIOR NECK performed by Riley Conte MD at [facility] Not on file   Tobacco Use    Smoking status: Never Smoker    Smokeless tobacco: Never Used   Vaping Use    Vaping Use: Never used   Substance and Sexual Activity    Alcohol use: Yes     Comment: couple of drinks per month     Drug use: Never    Sexual activity: Yes     Partners: Female       Current Outpatient Medications   Medication Sig Dispense Refill    atorvastatin (LIPITOR) 40 MG tablet TAKE 1 TABLET BY MOUTH EVERY DAY 90 tablet 1    hydroCHLOROthiazide (HYDRODIURIL) 25 MG tablet Take 1 tablet by mouth every morning For high blood pressure. 30 tablet 5    baclofen (LIORESAL) 20 MG tablet       HYDROcodone-acetaminophen (NORCO) 5-325 MG per tablet Take 1 tablet by mouth every 8 hours as needed.        amLODIPine (NORVASC) 10 MG tablet TAKE 1 TABLET BY MOUTH EVERY DAY 90 tablet 1    diclofenac (VOLTAREN) 75 MG EC tablet TAKE 1 TABLET BY MOUTH TWICE A DAY 60 tablet 3    LYRICA 100 MG capsule Take 100 mg by mouth 3 times daily.  Blood Pressure Monitoring (BLOOD PRESSURE CUFF) MISC Use to check BP daily 1 each 0     No current facility-administered medications for this visit. Objective:     He is alert, oriented x 3, pleasant, well nourished, developed and in no acute distress. Ht 5' 9\" (1.753 m)   Wt 260 lb (117.9 kg)   BMI 38.40 kg/m²        Examination of the left and right knee. There is not erythema. There mild soft tissue swelling. There is mild effusion. There moderate pain associated with ROM testing. Extensor Mechanism is  intact. Examination of the lower extremities are intact with sensation to light touch. Motor testing  5/5 in all major motor groups of the lower extremities. Gait is normal heel to toe. Gait is antalgic. Negative Sims's Sign. SLR negative. Examination of the lower extremities shows intact perfusion to both lower extremities. No cyanosis. Digits are warm to touch, capillary refill is less than 2 seconds. There is mild edema noted. The skin to be intact without lacerations or abrasions. No significant erythema. No rashes or skin lesions. X Rays: not performed in the office today:   Previous x-rays of the left and right knee from 1/20/2022 were reviewed today. He has advanced degenerative arthritis, grade 4K-L x-ray changes of the medial compartments both left and right knee. Diagnosis:       ICD-10-CM    1. Primary osteoarthritis of both knees  M17.0         Assessment/ Plan:       Assessment:  Advanced degenerative arthritis involving medial compartments of his left and right knee. No improvement with previous cortisone injections and viscosupplementation injections.     I had an extensive discussion with Mr. Tevin Gillis and/or family regarding the natural history, etiology, and long term consequences of his condition. I have presented reasonable alternatives to the patient's proposed care, treatment, and services. Risks and benefits of the treatment options also reviewed in detail. I have outlined a treatment plan with them. He has had full opportunity to ask his questions. I have answered them all to his satisfaction. I feel that Mr. Floridalma Huertas understands our discussion today. Discussed at length conservative treatment of knee symptoms/arthritis, according to AAOS Clinical Practice Guidelines:  1)  Recommend oral or topical NSAIDs to reduce pain and swelling. 2)  Recommend acetaminophen to reduce pain. 3)  Oral narcotics, including tramadol, result in a significant increase of adverse events and are not effective at improving pain or function for treatment of osteoarthritis of the knee. 4)  Recommend maintaining weight in a healthy range to reduce stresses on the knee, particularly the patellofemoral compartment which sees up to 6x body weight. 5)  Home exercise program, focusing on strengthening, low impact aerobic exercises, and neuromuscular education. 6)  Intra-articular corticosteroid injections are an option. Informed patient corticosteroid injections can be performed every 3-4 months as needed. 7)  Evidence for intra-articular hyaluronic acid injections (viscosupplementation) is not as strong, but may benefit a subset of patients who have failed other options. Informed patient viscosupplementation can be performed every 6 months as needed. 8)  Bracing and cane use can improve pain and function. Although not specifically listed in the CPGs, ice therapy and topical patches such as Salonpas are good options as well. Other non-surgical options including Coolief (cooled frequency ablation of the geniculate nerves), stem cell injections, PRP injections were discussed.     Surgical option, knee arthroplasty discussed. Plan:  Medications-he is already in pain management. PT- A home exercise program was instructed today including ROM exercises and strengthening exercises. The patient verbalized understanding of these exercises as well as the importance of the exercise program to promote return of normal function. If pain intensifies or other problems arise you are to notify the office. Procedures-discussed knee arthroplasty as a surgical treatment for his knee arthritis. He will follow-up in the office once he is ready to pursue total joint arthroplasty. Follow up:   Call or return to clinic if these symptoms worsen or fail to improve as anticipated. Ольга Roberto PA-C   Senior Physician Assistant   [facility]                                         Disclaimer: This note was generated with use of a verbal recognition program (DRAGON) and an attempt was made to check for errors. It is possible that there are still dictated errors within this office note. If so, please bring any significant errors to my attention for an addendum. All efforts were made to ensure that this office note is accurate.

## 2022-03-13 DIAGNOSIS — M25.562 CHRONIC PAIN OF BOTH KNEES: ICD-10-CM

## 2022-03-13 DIAGNOSIS — M25.561 CHRONIC PAIN OF BOTH KNEES: ICD-10-CM

## 2022-03-13 DIAGNOSIS — G89.29 CHRONIC PAIN OF BOTH KNEES: ICD-10-CM

## 2022-03-14 RX ORDER — DICLOFENAC SODIUM 75 MG/1
TABLET, DELAYED RELEASE ORAL
Qty: 60 TABLET | Refills: 3 | Status: SHIPPED | OUTPATIENT
Start: 2022-03-14

## 2022-04-18 ENCOUNTER — OFFICE VISIT (OUTPATIENT)
Dept: FAMILY MEDICINE CLINIC | Age: 57
End: 2022-04-18
Payer: MEDICARE

## 2022-04-18 VITALS
HEART RATE: 83 BPM | OXYGEN SATURATION: 95 % | HEIGHT: 69 IN | TEMPERATURE: 98.4 F | RESPIRATION RATE: 16 BRPM | SYSTOLIC BLOOD PRESSURE: 128 MMHG | WEIGHT: 254 LBS | DIASTOLIC BLOOD PRESSURE: 78 MMHG | BODY MASS INDEX: 37.62 KG/M2

## 2022-04-18 DIAGNOSIS — I10 ESSENTIAL HYPERTENSION: Primary | ICD-10-CM

## 2022-04-18 DIAGNOSIS — R74.8 ELEVATED LIVER ENZYMES: ICD-10-CM

## 2022-04-18 DIAGNOSIS — K76.0 FATTY LIVER: ICD-10-CM

## 2022-04-18 DIAGNOSIS — N52.9 ERECTILE DYSFUNCTION, UNSPECIFIED ERECTILE DYSFUNCTION TYPE: ICD-10-CM

## 2022-04-18 DIAGNOSIS — E78.00 HYPERCHOLESTEROLEMIA: ICD-10-CM

## 2022-04-18 DIAGNOSIS — E66.9 OBESITY, CLASS II, BMI 35-39.9: ICD-10-CM

## 2022-04-18 DIAGNOSIS — I10 ESSENTIAL HYPERTENSION: ICD-10-CM

## 2022-04-18 LAB
A/G RATIO: 1.2 (ref 1.1–2.2)
ALBUMIN SERPL-MCNC: 4 G/DL (ref 3.4–5)
ALP BLD-CCNC: 90 U/L (ref 40–129)
ALT SERPL-CCNC: 40 U/L (ref 10–40)
ANION GAP SERPL CALCULATED.3IONS-SCNC: 16 MMOL/L (ref 3–16)
AST SERPL-CCNC: 25 U/L (ref 15–37)
BILIRUB SERPL-MCNC: 0.5 MG/DL (ref 0–1)
BUN BLDV-MCNC: 13 MG/DL (ref 7–20)
CALCIUM SERPL-MCNC: 9.5 MG/DL (ref 8.3–10.6)
CHLORIDE BLD-SCNC: 97 MMOL/L (ref 99–110)
CHOLESTEROL, TOTAL: 228 MG/DL (ref 0–199)
CO2: 26 MMOL/L (ref 21–32)
CREAT SERPL-MCNC: 1.2 MG/DL (ref 0.9–1.3)
GFR AFRICAN AMERICAN: >60
GFR NON-AFRICAN AMERICAN: >60
GLUCOSE BLD-MCNC: 100 MG/DL (ref 70–99)
HDLC SERPL-MCNC: 49 MG/DL (ref 40–60)
LDL CHOLESTEROL CALCULATED: 163 MG/DL
POTASSIUM SERPL-SCNC: 3.5 MMOL/L (ref 3.5–5.1)
SODIUM BLD-SCNC: 139 MMOL/L (ref 136–145)
TOTAL PROTEIN: 7.3 G/DL (ref 6.4–8.2)
TRIGL SERPL-MCNC: 81 MG/DL (ref 0–150)
VLDLC SERPL CALC-MCNC: 16 MG/DL

## 2022-04-18 PROCEDURE — G8417 CALC BMI ABV UP PARAM F/U: HCPCS | Performed by: NURSE PRACTITIONER

## 2022-04-18 PROCEDURE — 99214 OFFICE O/P EST MOD 30 MIN: CPT | Performed by: NURSE PRACTITIONER

## 2022-04-18 PROCEDURE — G8427 DOCREV CUR MEDS BY ELIG CLIN: HCPCS | Performed by: NURSE PRACTITIONER

## 2022-04-18 PROCEDURE — 3017F COLORECTAL CA SCREEN DOC REV: CPT | Performed by: NURSE PRACTITIONER

## 2022-04-18 PROCEDURE — 1036F TOBACCO NON-USER: CPT | Performed by: NURSE PRACTITIONER

## 2022-04-18 RX ORDER — SILDENAFIL CITRATE 20 MG/1
20-60 TABLET ORAL DAILY PRN
Qty: 21 TABLET | Refills: 0 | Status: SHIPPED | OUTPATIENT
Start: 2022-04-18

## 2022-04-18 ASSESSMENT — ENCOUNTER SYMPTOMS
NAUSEA: 0
BACK PAIN: 1
DIARRHEA: 0
CONSTIPATION: 0
SHORTNESS OF BREATH: 0
VOMITING: 0
CHEST TIGHTNESS: 0
COUGH: 0

## 2022-04-18 ASSESSMENT — PATIENT HEALTH QUESTIONNAIRE - PHQ9
SUM OF ALL RESPONSES TO PHQ QUESTIONS 1-9: 1
6. FEELING BAD ABOUT YOURSELF - OR THAT YOU ARE A FAILURE OR HAVE LET YOURSELF OR YOUR FAMILY DOWN: 0
2. FEELING DOWN, DEPRESSED OR HOPELESS: 0
9. THOUGHTS THAT YOU WOULD BE BETTER OFF DEAD, OR OF HURTING YOURSELF: 0
SUM OF ALL RESPONSES TO PHQ QUESTIONS 1-9: 1
3. TROUBLE FALLING OR STAYING ASLEEP: 1
SUM OF ALL RESPONSES TO PHQ QUESTIONS 1-9: 1
10. IF YOU CHECKED OFF ANY PROBLEMS, HOW DIFFICULT HAVE THESE PROBLEMS MADE IT FOR YOU TO DO YOUR WORK, TAKE CARE OF THINGS AT HOME, OR GET ALONG WITH OTHER PEOPLE: 0
4. FEELING TIRED OR HAVING LITTLE ENERGY: 0
7. TROUBLE CONCENTRATING ON THINGS, SUCH AS READING THE NEWSPAPER OR WATCHING TELEVISION: 0
1. LITTLE INTEREST OR PLEASURE IN DOING THINGS: 0
SUM OF ALL RESPONSES TO PHQ9 QUESTIONS 1 & 2: 0
SUM OF ALL RESPONSES TO PHQ QUESTIONS 1-9: 1
8. MOVING OR SPEAKING SO SLOWLY THAT OTHER PEOPLE COULD HAVE NOTICED. OR THE OPPOSITE, BEING SO FIGETY OR RESTLESS THAT YOU HAVE BEEN MOVING AROUND A LOT MORE THAN USUAL: 0
5. POOR APPETITE OR OVEREATING: 0

## 2022-04-18 NOTE — PROGRESS NOTES
2022    This is a 60 y.o. male   Chief Complaint   Patient presents with    Hypertension     bp is running a little higher than he would like    Knee Pain     still having knee pain. bilateral    Foot Pain     still having rt foot pain   . HPI  Hypertension:  Home blood pressure monitorin-150/70-80s. He is not adherent to a low sodium diet. Patient denies chest pain, shortness of breath, headache, peripheral edema and palpitations. Antihypertensive medication side effects: no medication side effects noted. Sodium (mmol/L)   Date Value   10/22/2021 141    BUN (mg/dL)   Date Value   10/22/2021 18    Glucose (mg/dL)   Date Value   10/22/2021 88      Potassium (mmol/L)   Date Value   10/22/2021 3.5    CREATININE (mg/dL)   Date Value   10/22/2021 1.0         Hyperlipidemia:  No new myalgias or GI upset on atorvastatin (Lipitor). Medication compliance: compliant most of the time. Patient is not following a low fat, low cholesterol diet. He is not exercising regularly. Lab Results   Component Value Date    HDL 50 2021    LDLCALC 129 (H) 2021     Lab Results   Component Value Date    ALT 32 2021    AST 23 2021        Obesity- no routine diet or exercise. Continues to follow with pain management [facility] for chronic pain in right foot and low back pain. Prescribed lyrica TID and norco TID from pain specialist.  Patient reports that he can take brand-name Lyrica but not able to take the generic due to allergy. Patient reports these pain medications help decrease his pain slightly. Had epidural injection in low back without improvement in pain. Has not had another epidural injection. Has not had f/u with ortho for arianna knee arthritis. Tried cortisone injection in the past without improvement in symptoms. Last blood work showed elevated liver enzymes. U/s showed fatty liver.  Reports that he has GI evaluation with Dr. Rosemary Melara and told to monitor. Reports that he has cut down his alcohol drinks to 3 per week. Positive for ED. Unable to maintain an erection. Would like to try medications. Patient Active Problem List   Diagnosis    BWC Fracture of phalanx of right foot, open    BWC Open fracture of phalanx of lesser toe of right foot    BWC Sprain of right foot    BWC Crushing injury of right foot    BWC Contusion of right foot    Complex regional pain syndrome type 1 of right lower extremity    Essential hypertension    Obesity, Class II, BMI 35-39.9    Hypercholesterolemia    Sebaceous cyst    Elevated liver enzymes    Fatty liver- u/s 10/2021    Primary osteoarthritis of both knees          Current Outpatient Medications   Medication Sig Dispense Refill    diclofenac (VOLTAREN) 75 MG EC tablet TAKE 1 TABLET BY MOUTH TWICE A DAY 60 tablet 3    atorvastatin (LIPITOR) 40 MG tablet TAKE 1 TABLET BY MOUTH EVERY DAY 90 tablet 1    hydroCHLOROthiazide (HYDRODIURIL) 25 MG tablet Take 1 tablet by mouth every morning For high blood pressure. 30 tablet 5    baclofen (LIORESAL) 20 MG tablet       HYDROcodone-acetaminophen (NORCO) 5-325 MG per tablet Take 1 tablet by mouth every 8 hours as needed.  amLODIPine (NORVASC) 10 MG tablet TAKE 1 TABLET BY MOUTH EVERY DAY 90 tablet 1    LYRICA 100 MG capsule Take 100 mg by mouth 3 times daily.  Blood Pressure Monitoring (BLOOD PRESSURE CUFF) MISC Use to check BP daily 1 each 0     No current facility-administered medications for this visit. Allergies   Allergen Reactions    Lyrica [Pregabalin] Hives    Tramadol Itching    Cymbalta [Duloxetine Hcl] Itching and Rash       Review of Systems   Constitutional: Negative for activity change and fever. Respiratory: Negative for cough, chest tightness and shortness of breath. Cardiovascular: Negative for chest pain, palpitations and leg swelling.    Gastrointestinal: Negative for constipation, diarrhea, nausea and vomiting. Musculoskeletal: Positive for arthralgias, back pain and myalgias. Neurological: Negative for dizziness, syncope, weakness and headaches. Psychiatric/Behavioral: Negative for confusion. Vitals:    04/18/22 0822   BP: 128/78   Site: Left Upper Arm   Position: Sitting   Cuff Size: Large Adult   Pulse: 83   Resp: 16   Temp: 98.4 °F (36.9 °C)   TempSrc: Oral   SpO2: 95%   Weight: 254 lb (115.2 kg)   Height: 5' 9\" (1.753 m)       Body mass index is 37.51 kg/m². Wt Readings from Last 3 Encounters:   04/18/22 254 lb (115.2 kg)   02/24/22 260 lb (117.9 kg)   01/20/22 251 lb (113.9 kg)       BP Readings from Last 3 Encounters:   04/18/22 128/78   12/17/21 126/80   11/19/21 (!) 146/92       Physical Exam  Vitals and nursing note reviewed. Constitutional:       General: He is not in acute distress. Appearance: He is well-developed. He is obese. HENT:      Head: Normocephalic and atraumatic. Cardiovascular:      Rate and Rhythm: Normal rate and regular rhythm. Heart sounds: Normal heart sounds. No murmur heard. No friction rub. No gallop. Pulmonary:      Effort: Pulmonary effort is normal. No respiratory distress. Breath sounds: Normal breath sounds. Musculoskeletal:      Cervical back: Neck supple. Right lower leg: No edema. Left lower leg: No edema. Skin:     General: Skin is warm and dry. Neurological:      Mental Status: He is alert and oriented to person, place, and time. Psychiatric:         Behavior: Behavior normal.         Thought Content: Thought content normal.         Judgment: Judgment normal.         Assessmentand Plan  Juanjo Griggs was seen today for hypertension. Diagnoses and all orders for this visit:    Essential hypertension: controlled   -     amLODIPine (NORVASC) 10 MG tablet; TAKE 1 TABLET BY MOUTH EVERY DAY  - HCTZ 25 mg daily. Advised to work on aerobic exercise and weight loss.    Should bring in BP cuff at next visit to check accuracy. Hypercholesterolemia  On atorvastatin 40 mg daily  -     Comprehensive Metabolic Panel; Future  -     Lipid Panel; Future  Advised to work on diet, aerobic exercise, and weight loss. Obesity: weight has decreased from last visit  Diet, aerobic exercise, and weight loss discussed and needed. Elevated liver enzymes/ fatty liver   Repeat liver function   Advised to avoid alcohol. Discussed the importance of diet and weight loss. Will try to obtain OV notes with GI Dr. Efren Combs of both knees  Advised to have another f/u with ortho to discuss treatment options. Erectile dysfunction, unspecified erectile dysfunction type  -     sildenafil (REVATIO) 20 MG tablet; Take 1-3 tablets by mouth daily as needed (ED)  Side effects of medication discussed. Return in about 3 months (around 7/18/2022), or if symptoms worsen or fail to improve, for AWV.

## 2022-04-19 DIAGNOSIS — E78.00 HYPERCHOLESTEROLEMIA: ICD-10-CM

## 2022-04-21 RX ORDER — ATORVASTATIN CALCIUM 40 MG/1
TABLET, FILM COATED ORAL
Qty: 90 TABLET | Refills: 1 | OUTPATIENT
Start: 2022-04-21

## 2022-06-13 DIAGNOSIS — I10 ESSENTIAL HYPERTENSION: ICD-10-CM

## 2022-06-13 RX ORDER — HYDROCHLOROTHIAZIDE 25 MG/1
25 TABLET ORAL EVERY MORNING
Qty: 90 TABLET | Refills: 0 | Status: SHIPPED | OUTPATIENT
Start: 2022-06-13

## 2022-07-07 DIAGNOSIS — E78.00 HYPERCHOLESTEROLEMIA: ICD-10-CM

## 2022-07-07 RX ORDER — ATORVASTATIN CALCIUM 40 MG/1
TABLET, FILM COATED ORAL
Qty: 90 TABLET | Refills: 1 | Status: SHIPPED | OUTPATIENT
Start: 2022-07-07

## 2022-07-18 ENCOUNTER — OFFICE VISIT (OUTPATIENT)
Dept: FAMILY MEDICINE CLINIC | Age: 57
End: 2022-07-18
Payer: MEDICARE

## 2022-07-18 VITALS
BODY MASS INDEX: 36.2 KG/M2 | HEART RATE: 74 BPM | WEIGHT: 244.4 LBS | HEIGHT: 69 IN | TEMPERATURE: 98.4 F | DIASTOLIC BLOOD PRESSURE: 70 MMHG | OXYGEN SATURATION: 95 % | RESPIRATION RATE: 14 BRPM | SYSTOLIC BLOOD PRESSURE: 122 MMHG

## 2022-07-18 DIAGNOSIS — Z00.00 WELCOME TO MEDICARE PREVENTIVE VISIT: Primary | ICD-10-CM

## 2022-07-18 DIAGNOSIS — Z12.5 SCREENING PSA (PROSTATE SPECIFIC ANTIGEN): ICD-10-CM

## 2022-07-18 DIAGNOSIS — G89.4 CHRONIC PAIN SYNDROME: ICD-10-CM

## 2022-07-18 DIAGNOSIS — I10 ESSENTIAL HYPERTENSION: ICD-10-CM

## 2022-07-18 DIAGNOSIS — S16.1XXA STRAIN OF NECK MUSCLE, INITIAL ENCOUNTER: ICD-10-CM

## 2022-07-18 DIAGNOSIS — E78.00 HYPERCHOLESTEROLEMIA: ICD-10-CM

## 2022-07-18 PROCEDURE — G0402 INITIAL PREVENTIVE EXAM: HCPCS | Performed by: NURSE PRACTITIONER

## 2022-07-18 PROCEDURE — 3017F COLORECTAL CA SCREEN DOC REV: CPT | Performed by: NURSE PRACTITIONER

## 2022-07-18 RX ORDER — AMLODIPINE BESYLATE 10 MG/1
TABLET ORAL
Qty: 90 TABLET | Refills: 1 | Status: SHIPPED | OUTPATIENT
Start: 2022-07-18

## 2022-07-18 ASSESSMENT — PATIENT HEALTH QUESTIONNAIRE - PHQ9
SUM OF ALL RESPONSES TO PHQ QUESTIONS 1-9: 0
2. FEELING DOWN, DEPRESSED OR HOPELESS: 0
SUM OF ALL RESPONSES TO PHQ QUESTIONS 1-9: 0
SUM OF ALL RESPONSES TO PHQ9 QUESTIONS 1 & 2: 0
1. LITTLE INTEREST OR PLEASURE IN DOING THINGS: 0

## 2022-07-18 ASSESSMENT — LIFESTYLE VARIABLES
HOW OFTEN DO YOU HAVE A DRINK CONTAINING ALCOHOL: 2-4 TIMES A MONTH
HOW MANY STANDARD DRINKS CONTAINING ALCOHOL DO YOU HAVE ON A TYPICAL DAY: 1 OR 2

## 2022-07-18 NOTE — PROGRESS NOTES
Medicare Annual Wellness Visit    Mira Umaña is here for Medicare AWV (Neck pain. X1 week. Lt side. Rt elbow pain. )    Assessment & Plan   Welcome to Medicare preventive visit  -     Comprehensive Metabolic Panel; Future  -     Lipid Panel; Future  -     PSA Screening; Future  -     CBC with Auto Differential; Future  -     TSH with Reflex; Future  Healthy lifestyles reviewed: diet, aerobic exercise, sunscreen, vision and dental exams  Advised to obtain shingles vaccine at his pharmacy  Advised to obtain COVID-19 vaccine at his pharmacy. Essential hypertension: controlled   -     Comprehensive Metabolic Panel; Future  -     amLODIPine (NORVASC) 10 MG tablet; TAKE 1 TABLET BY MOUTH EVERY DAY, Disp-90 tablet, R-1Normal  - HCTZ 25 mg daily     Hypercholesterolemia  -     Lipid Panel; Future  On atorvastatin 40 mg daily     Screening PSA (prostate specific antigen)  -     PSA Screening; Future    Strain of neck muscle, initial encounter  Patient reports that symptoms are improving. Declined referral to PT. Already on voltaren and baclofen. Patient is to call if symptoms worsen or fail to resolve within the next couple of weeks. Chronic pain syndrome  Continue to f/u with pain specialist.       Recommendations for Preventive Services Due: see orders and patient instructions/AVS.  Recommended screening schedule for the next 5-10 years is provided to the patient in written form: see Patient Instructions/AVS.     Return in 6 months (on 1/18/2023), or if symptoms worsen or fail to improve, for chronic conditions. Subjective     Patient reports that for the past week has had pain on the left side of the neck. Developed after he was on vacation. He has been applying ice and heat with improvement in symptoms. Pain currently is 6/10. Denies radiating pain or numbness. Been taking diclofenac 75 mg BID and baclofen daily PRN and lyrica 100 mg TID and norco TID PRN. This is helping with his symptoms.      Has chronic right elbow pain that has been causing pain for multiple years. Will have slight numbness if he has flexed for a long period of time. Denies weakness. Has seen ortho in the distant past and was give a brace which he no longer wears. Follows with pain specialist Dr. Giovanni Sullivan monthly for chronic right foot pain. Hypertension:  Home blood pressure monitoring: Yes - 130s/70s. He is not adherent to a low sodium diet. Patient denies chest pain, shortness of breath, headache, peripheral edema, and palpitations. Antihypertensive medication side effects: no medication side effects noted. Hyperlipidemia:  No new myalgias or GI upset on atorvastatin (Lipitor). Exercise- nothing routine     Diet- nothing specific     No results found for: LABA1C  Lab Results   Component Value Date    LABMICR YES 12/28/2020    CREATININE 1.2 04/18/2022     Lab Results   Component Value Date    ALT 40 04/18/2022    AST 25 04/18/2022     Lab Results   Component Value Date    CHOL 228 (H) 04/18/2022    TRIG 81 04/18/2022    HDL 49 04/18/2022    LDLCALC 163 (H) 04/18/2022        ETOH- 4-5 drinks per week     Patient's complete Health Risk Assessment and screening values have been reviewed and are found in Flowsheets. The following problems were reviewed today and where indicated follow up appointments were made and/or referrals ordered.     Positive Risk Factor Screenings with Interventions:             General Health and ACP:  General  In general, how would you say your health is?: Good  In the past 7 days, have you experienced any of the following: New or Increased Pain, New or Increased Fatigue, Loneliness, Social Isolation, Stress or Anger?: (!) Yes (neck pain)  Select all that apply: (!) New or Increased Pain  Do you get the social and emotional support that you need?: Yes  Do you have a Living Will?: (!) No    Advance Directives       Power of  Living Will ACP-Advance Directive ACP-Power of     Not on File Not on File Not on File Not on File        General Health Risk Interventions:  Pain issues: patient declines any further evaluation/treatment for this issue  No Living Will: Patient declines ACP discussion/assistance  Patient continues to follow with pain management     Health Habits/Nutrition:  Physical Activity: Insufficiently Active    Days of Exercise per Week: 1 day    Minutes of Exercise per Session: 10 min        Body mass index: (!) 36.09  Have you seen the dentist within the past year?: (!) No  Health Habits/Nutrition Interventions:  Dental exam overdue:  patient encouraged to make appointment with his/her dentist    Hearing/Vision:  Do you or your family notice any trouble with your hearing that hasn't been managed with hearing aids?: No  Do you have difficulty driving, watching TV, or doing any of your daily activities because of your eyesight?: (!) Yes (reading)  Have you had an eye exam within the past year?: (!) No  No results found. Hearing/Vision Interventions:  Vision concerns:  patient encouraged to make appointment with his/her eye specialist    Safety:  Do you have working smoke detectors?: Yes  Do you have any tripping hazards - loose or unsecured carpets or rugs?: No  Do you have any tripping hazards - clutter in doorways, halls, or stairs?: No  Do you have either shower bars, grab bars, non-slip mats or non-slip surfaces in your shower or bathtub?: (!) No  Do all of your stairways have a railing or banister?: Yes  Do you always fasten your seatbelt when you are in a car?: Yes  Safety Interventions:  Home safety tips provided           Objective   Vitals:    07/18/22 0843   BP: 122/70   Site: Right Upper Arm   Position: Sitting   Cuff Size: Large Adult   Pulse: 74   Resp: 14   Temp: 98.4 °F (36.9 °C)   TempSrc: Oral   SpO2: 95%   Weight: 244 lb 6.4 oz (110.9 kg)   Height: 5' 9\" (1.753 m)      Body mass index is 36.09 kg/m².       General Appearance: alert and oriented to person, place and time, well developed and well- nourished, in no acute distress  Skin: warm and dry, no rash or erythema  Head: normocephalic and atraumatic  Neck: Positive for decreased cervical spine ROM in all directions. Royal upper extremity strength is 5/5. Pulmonary/Chest: clear to auscultation bilaterally- no wheezes, rales or rhonchi, normal air movement, no respiratory distress  Cardiovascular: normal rate, regular rhythm, normal S1 and S2, no murmurs, rubs, clicks, or gallops, distal pulses intact, no carotid bruits  Extremities: Trace royal ankle edema       Allergies   Allergen Reactions    Lyrica [Pregabalin] Hives    Tramadol Itching    Cymbalta [Duloxetine Hcl] Itching and Rash     Prior to Visit Medications    Medication Sig Taking? Authorizing Provider   atorvastatin (LIPITOR) 40 MG tablet TAKE 1 TABLET BY MOUTH EVERY DAY Yes SONALI Wilburn CNP   hydroCHLOROthiazide (HYDRODIURIL) 25 MG tablet TAKE 1 TABLET BY MOUTH EVERY MORNING FOR HIGH BLOOD PRESSURE Yes SONALI Wilburn CNP   sildenafil (REVATIO) 20 MG tablet Take 1-3 tablets by mouth daily as needed (ED) Yes SONALI Wilburn CNP   diclofenac (VOLTAREN) 75 MG EC tablet TAKE 1 TABLET BY MOUTH TWICE A DAY Yes SONALI Wilburn CNP   baclofen (LIORESAL) 20 MG tablet  Yes Historical Provider, MD   HYDROcodone-acetaminophen (NORCO) 5-325 MG per tablet Take 1 tablet by mouth every 8 hours as needed. Yes Historical Provider, MD   amLODIPine (NORVASC) 10 MG tablet TAKE 1 TABLET BY MOUTH EVERY DAY Yes SONALI Wilburn CNP   LYRICA 100 MG capsule Take 100 mg by mouth 3 times daily.  Yes Historical Provider, MD   Blood Pressure Monitoring (BLOOD PRESSURE CUFF) MISC Use to check BP daily Yes SONALI Wilburn CNP       CareTeam (Including outside providers/suppliers regularly involved in providing care):   Patient Care Team:  SONALI Wilburn CNP as PCP - General (Family Nurse Practitioner)  Yohannes Zheng Lodema Catahoula - CNP as PCP - [facility] Empaneled Provider  Vivian Cisneros MD as Consulting Physician (Pain Management)     Reviewed and updated this visit:  Tobacco  Allergies  Meds  Med Hx  Surg Hx  Soc Hx  Fam Hx

## 2022-07-18 NOTE — PATIENT INSTRUCTIONS
Personalized Preventive Plan for Anupama Blanco - 7/18/2022  Medicare offers a range of preventive health benefits. Some of the tests and screenings are paid in full while other may be subject to a deductible, co-insurance, and/or copay. Some of these benefits include a comprehensive review of your medical history including lifestyle, illnesses that may run in your family, and various assessments and screenings as appropriate. After reviewing your medical record and screening and assessments performed today your provider may have ordered immunizations, labs, imaging, and/or referrals for you. A list of these orders (if applicable) as well as your Preventive Care list are included within your After Visit Summary for your review. Other Preventive Recommendations:    A preventive eye exam performed by an eye specialist is recommended every 1-2 years to screen for glaucoma; cataracts, macular degeneration, and other eye disorders. A preventive dental visit is recommended every 6 months. Try to get at least 150 minutes of exercise per week or 10,000 steps per day on a pedometer . Order or download the FREE \"Exercise & Physical Activity: Your Everyday Guide\" from [employer]. Call 9-757.469.8093 or search [employer] online. You need 1057-2757 mg of calcium and 1283-1035 IU of vitamin D per day. It is possible to meet your calcium requirement with diet alone, but a vitamin D supplement is usually necessary to meet this goal.  When exposed to the sun, use a sunscreen that protects against both UVA and UVB radiation with an SPF of 30 or greater. Reapply every 2 to 3 hours or after sweating, drying off with a towel, or swimming. Always wear a seat belt when traveling in a car. Always wear a helmet when riding a bicycle or motorcycle.

## 2022-07-19 DIAGNOSIS — E78.00 HYPERCHOLESTEROLEMIA: ICD-10-CM

## 2022-07-19 DIAGNOSIS — Z00.00 WELCOME TO MEDICARE PREVENTIVE VISIT: ICD-10-CM

## 2022-07-19 DIAGNOSIS — Z12.5 SCREENING PSA (PROSTATE SPECIFIC ANTIGEN): ICD-10-CM

## 2022-07-19 DIAGNOSIS — I10 ESSENTIAL HYPERTENSION: ICD-10-CM

## 2022-07-19 LAB
A/G RATIO: 1.3 (ref 1.1–2.2)
ALBUMIN SERPL-MCNC: 4.3 G/DL (ref 3.4–5)
ALP BLD-CCNC: 93 U/L (ref 40–129)
ALT SERPL-CCNC: 26 U/L (ref 10–40)
ANION GAP SERPL CALCULATED.3IONS-SCNC: 14 MMOL/L (ref 3–16)
AST SERPL-CCNC: 23 U/L (ref 15–37)
BASOPHILS ABSOLUTE: 0.1 K/UL (ref 0–0.2)
BASOPHILS RELATIVE PERCENT: 0.7 %
BILIRUB SERPL-MCNC: 0.6 MG/DL (ref 0–1)
BUN BLDV-MCNC: 12 MG/DL (ref 7–20)
CALCIUM SERPL-MCNC: 9.6 MG/DL (ref 8.3–10.6)
CHLORIDE BLD-SCNC: 98 MMOL/L (ref 99–110)
CHOLESTEROL, TOTAL: 200 MG/DL (ref 0–199)
CO2: 27 MMOL/L (ref 21–32)
CREAT SERPL-MCNC: 1 MG/DL (ref 0.9–1.3)
EOSINOPHILS ABSOLUTE: 0.2 K/UL (ref 0–0.6)
EOSINOPHILS RELATIVE PERCENT: 2 %
GFR AFRICAN AMERICAN: >60
GFR NON-AFRICAN AMERICAN: >60
GLUCOSE BLD-MCNC: 89 MG/DL (ref 70–99)
HCT VFR BLD CALC: 50.5 % (ref 40.5–52.5)
HDLC SERPL-MCNC: 47 MG/DL (ref 40–60)
HEMOGLOBIN: 17.2 G/DL (ref 13.5–17.5)
LDL CHOLESTEROL CALCULATED: 137 MG/DL
LYMPHOCYTES ABSOLUTE: 2.7 K/UL (ref 1–5.1)
LYMPHOCYTES RELATIVE PERCENT: 34.5 %
MCH RBC QN AUTO: 30.7 PG (ref 26–34)
MCHC RBC AUTO-ENTMCNC: 34.1 G/DL (ref 31–36)
MCV RBC AUTO: 90 FL (ref 80–100)
MONOCYTES ABSOLUTE: 0.6 K/UL (ref 0–1.3)
MONOCYTES RELATIVE PERCENT: 8 %
NEUTROPHILS ABSOLUTE: 4.3 K/UL (ref 1.7–7.7)
NEUTROPHILS RELATIVE PERCENT: 54.8 %
PDW BLD-RTO: 13.3 % (ref 12.4–15.4)
PLATELET # BLD: 253 K/UL (ref 135–450)
PMV BLD AUTO: 8.4 FL (ref 5–10.5)
POTASSIUM SERPL-SCNC: 3.6 MMOL/L (ref 3.5–5.1)
PROSTATE SPECIFIC ANTIGEN: 1.17 NG/ML (ref 0–4)
RBC # BLD: 5.61 M/UL (ref 4.2–5.9)
SODIUM BLD-SCNC: 139 MMOL/L (ref 136–145)
TOTAL PROTEIN: 7.6 G/DL (ref 6.4–8.2)
TRIGL SERPL-MCNC: 78 MG/DL (ref 0–150)
TSH REFLEX: 3.13 UIU/ML (ref 0.27–4.2)
VLDLC SERPL CALC-MCNC: 16 MG/DL
WBC # BLD: 7.8 K/UL (ref 4–11)

## 2022-07-21 ENCOUNTER — TELEPHONE (OUTPATIENT)
Dept: FAMILY MEDICINE CLINIC | Age: 57
End: 2022-07-21

## 2022-07-21 NOTE — TELEPHONE ENCOUNTER
Patient returning mulu phone call from yesterday for lab results   Please give patient a call back he will be available anytime for a call back   Please advise

## 2023-01-18 ENCOUNTER — OFFICE VISIT (OUTPATIENT)
Dept: FAMILY MEDICINE CLINIC | Age: 58
End: 2023-01-18
Payer: MEDICARE

## 2023-01-18 VITALS
WEIGHT: 253 LBS | HEIGHT: 69 IN | RESPIRATION RATE: 16 BRPM | HEART RATE: 75 BPM | SYSTOLIC BLOOD PRESSURE: 156 MMHG | TEMPERATURE: 98.3 F | DIASTOLIC BLOOD PRESSURE: 82 MMHG | BODY MASS INDEX: 37.47 KG/M2 | OXYGEN SATURATION: 95 %

## 2023-01-18 DIAGNOSIS — Z23 NEED FOR INFLUENZA VACCINATION: ICD-10-CM

## 2023-01-18 DIAGNOSIS — R74.8 ELEVATED LIVER ENZYMES: ICD-10-CM

## 2023-01-18 DIAGNOSIS — I10 ESSENTIAL HYPERTENSION: Primary | ICD-10-CM

## 2023-01-18 DIAGNOSIS — I10 ESSENTIAL HYPERTENSION: ICD-10-CM

## 2023-01-18 DIAGNOSIS — K76.0 FATTY LIVER: ICD-10-CM

## 2023-01-18 DIAGNOSIS — E78.00 HYPERCHOLESTEROLEMIA: ICD-10-CM

## 2023-01-18 DIAGNOSIS — Z12.11 COLON CANCER SCREENING: ICD-10-CM

## 2023-01-18 DIAGNOSIS — E66.01 SEVERE OBESITY (BMI 35.0-39.9) WITH COMORBIDITY (HCC): ICD-10-CM

## 2023-01-18 LAB
A/G RATIO: 1.3 (ref 1.1–2.2)
ALBUMIN SERPL-MCNC: 3.9 G/DL (ref 3.4–5)
ALP BLD-CCNC: 84 U/L (ref 40–129)
ALT SERPL-CCNC: 54 U/L (ref 10–40)
ANION GAP SERPL CALCULATED.3IONS-SCNC: 11 MMOL/L (ref 3–16)
AST SERPL-CCNC: 35 U/L (ref 15–37)
BILIRUB SERPL-MCNC: 0.7 MG/DL (ref 0–1)
BUN BLDV-MCNC: 11 MG/DL (ref 7–20)
CALCIUM SERPL-MCNC: 9.3 MG/DL (ref 8.3–10.6)
CHLORIDE BLD-SCNC: 101 MMOL/L (ref 99–110)
CO2: 28 MMOL/L (ref 21–32)
CREAT SERPL-MCNC: 1 MG/DL (ref 0.9–1.3)
GFR SERPL CREATININE-BSD FRML MDRD: >60 ML/MIN/{1.73_M2}
GLUCOSE BLD-MCNC: 96 MG/DL (ref 70–99)
POTASSIUM SERPL-SCNC: 3.5 MMOL/L (ref 3.5–5.1)
SODIUM BLD-SCNC: 140 MMOL/L (ref 136–145)
TOTAL PROTEIN: 6.9 G/DL (ref 6.4–8.2)

## 2023-01-18 PROCEDURE — G8417 CALC BMI ABV UP PARAM F/U: HCPCS | Performed by: NURSE PRACTITIONER

## 2023-01-18 PROCEDURE — 99214 OFFICE O/P EST MOD 30 MIN: CPT | Performed by: NURSE PRACTITIONER

## 2023-01-18 PROCEDURE — G8482 FLU IMMUNIZE ORDER/ADMIN: HCPCS | Performed by: NURSE PRACTITIONER

## 2023-01-18 PROCEDURE — 3079F DIAST BP 80-89 MM HG: CPT | Performed by: NURSE PRACTITIONER

## 2023-01-18 PROCEDURE — G0008 ADMIN INFLUENZA VIRUS VAC: HCPCS | Performed by: NURSE PRACTITIONER

## 2023-01-18 PROCEDURE — 90674 CCIIV4 VAC NO PRSV 0.5 ML IM: CPT | Performed by: NURSE PRACTITIONER

## 2023-01-18 PROCEDURE — 3017F COLORECTAL CA SCREEN DOC REV: CPT | Performed by: NURSE PRACTITIONER

## 2023-01-18 PROCEDURE — 3077F SYST BP >= 140 MM HG: CPT | Performed by: NURSE PRACTITIONER

## 2023-01-18 PROCEDURE — 1036F TOBACCO NON-USER: CPT | Performed by: NURSE PRACTITIONER

## 2023-01-18 PROCEDURE — G8427 DOCREV CUR MEDS BY ELIG CLIN: HCPCS | Performed by: NURSE PRACTITIONER

## 2023-01-18 RX ORDER — AMLODIPINE BESYLATE 10 MG/1
TABLET ORAL
Qty: 90 TABLET | Refills: 0 | Status: SHIPPED | OUTPATIENT
Start: 2023-01-18

## 2023-01-18 RX ORDER — HYDROCHLOROTHIAZIDE 25 MG/1
25 TABLET ORAL EVERY MORNING
Qty: 90 TABLET | Refills: 0 | Status: SHIPPED | OUTPATIENT
Start: 2023-01-18

## 2023-01-18 SDOH — ECONOMIC STABILITY: FOOD INSECURITY: WITHIN THE PAST 12 MONTHS, YOU WORRIED THAT YOUR FOOD WOULD RUN OUT BEFORE YOU GOT MONEY TO BUY MORE.: NEVER TRUE

## 2023-01-18 SDOH — ECONOMIC STABILITY: FOOD INSECURITY: WITHIN THE PAST 12 MONTHS, THE FOOD YOU BOUGHT JUST DIDN'T LAST AND YOU DIDN'T HAVE MONEY TO GET MORE.: NEVER TRUE

## 2023-01-18 ASSESSMENT — ENCOUNTER SYMPTOMS
VOMITING: 0
NAUSEA: 0
DIARRHEA: 0
SHORTNESS OF BREATH: 0
CHEST TIGHTNESS: 0
COUGH: 0
CONSTIPATION: 0
BACK PAIN: 1

## 2023-01-18 ASSESSMENT — PATIENT HEALTH QUESTIONNAIRE - PHQ9
SUM OF ALL RESPONSES TO PHQ QUESTIONS 1-9: 0
SUM OF ALL RESPONSES TO PHQ QUESTIONS 1-9: 0
1. LITTLE INTEREST OR PLEASURE IN DOING THINGS: 0
SUM OF ALL RESPONSES TO PHQ QUESTIONS 1-9: 0
2. FEELING DOWN, DEPRESSED OR HOPELESS: 0
SUM OF ALL RESPONSES TO PHQ9 QUESTIONS 1 & 2: 0
SUM OF ALL RESPONSES TO PHQ QUESTIONS 1-9: 0

## 2023-01-18 ASSESSMENT — SOCIAL DETERMINANTS OF HEALTH (SDOH): HOW HARD IS IT FOR YOU TO PAY FOR THE VERY BASICS LIKE FOOD, HOUSING, MEDICAL CARE, AND HEATING?: NOT HARD AT ALL

## 2023-01-18 NOTE — PROGRESS NOTES
2023    This is a 59 y.o. male   Chief Complaint   Patient presents with    Hypertension     Has not been taking meds for 2-3 months. Ran out of meds. Foot Pain     Still having rt foot pain. Sarwat Hutton HPI  Hypertension:  Home blood pressure monitorin-150/70-80s. He is not adherent to a low sodium diet. Patient denies chest pain, shortness of breath, headache, peripheral edema and palpitations. Antihypertensive medication side effects: no medication side effects noted. Has been out of medication for the past couple of months and did not call for a refill. Should be taking HCTZ 25 mg daily and amlodipine 10 mg daily                                      Sodium (mmol/L)   Date Value   2022 139    BUN (mg/dL)   Date Value   2022 12    Glucose (mg/dL)   Date Value   2022 89      Potassium (mmol/L)   Date Value   2022 3.6    Creatinine (mg/dL)   Date Value   2022 1.0         Hyperlipidemia:  No new myalgias or GI upset on atorvastatin (Lipitor). Medication compliance: compliant most of the time. Patient is not following a low fat, low cholesterol diet. He is not exercising regularly. Lab Results   Component Value Date    CHOL 200 (H) 2022    TRIG 78 2022    HDL 47 2022    LDLCALC 137 (H) 2022     Lab Results   Component Value Date    ALT 26 2022    AST 23 2022        Obesity- no routine diet or exercise. Continues to follow with pain management [facility] for chronic pain in right foot and low back pain. Prescribed lyrica TID and norco TID from pain specialist.  Patient reports that he can take brand-name Lyrica but not able to take the generic due to allergy. Patient reports these pain medications help decrease his pain slightly. Had epidural injection in low back without improvement in pain. Has not had another epidural injection. Has not had f/u with ortho for arianna knee arthritis.  Tried cortisone injection in the past without improvement in symptoms. History of elevated liver enzymes. U/s showed fatty liver. Reports that he has GI evaluation with Dr. Rossy Hyman and told to monitor. Reports that he has cut down his alcohol drinks to 4 per week. Patient Active Problem List   Diagnosis    BWC Fracture of phalanx of right foot, open    BWC Open fracture of phalanx of lesser toe of right foot    BWC Sprain of right foot    BWC Crushing injury of right foot    BWC Contusion of right foot    Complex regional pain syndrome type 1 of right lower extremity    Essential hypertension    Obesity, Class II, BMI 35-39.9    Hypercholesterolemia    Sebaceous cyst    Elevated liver enzymes    Fatty liver- u/s 10/2021    Primary osteoarthritis of both knees          Current Outpatient Medications   Medication Sig Dispense Refill    atorvastatin (LIPITOR) 40 MG tablet TAKE 1 TABLET BY MOUTH EVERY DAY 90 tablet 1    hydroCHLOROthiazide (HYDRODIURIL) 25 MG tablet TAKE 1 TABLET BY MOUTH EVERY MORNING FOR HIGH BLOOD PRESSURE 90 tablet 0    sildenafil (REVATIO) 20 MG tablet Take 1-3 tablets by mouth daily as needed (ED) 21 tablet 0    diclofenac (VOLTAREN) 75 MG EC tablet TAKE 1 TABLET BY MOUTH TWICE A DAY 60 tablet 3    baclofen (LIORESAL) 20 MG tablet       HYDROcodone-acetaminophen (NORCO) 5-325 MG per tablet Take 1 tablet by mouth every 8 hours as needed. LYRICA 100 MG capsule Take 100 mg by mouth 3 times daily. Blood Pressure Monitoring (BLOOD PRESSURE CUFF) MISC Use to check BP daily 1 each 0    amLODIPine (NORVASC) 10 MG tablet TAKE 1 TABLET BY MOUTH EVERY DAY (Patient not taking: Reported on 1/18/2023) 90 tablet 1     No current facility-administered medications for this visit. Allergies   Allergen Reactions    Lyrica [Pregabalin] Hives    Tramadol Itching    Cymbalta [Duloxetine Hcl] Itching and Rash       Review of Systems   Constitutional:  Negative for activity change and fever.    Respiratory: Negative for cough, chest tightness and shortness of breath. Cardiovascular:  Negative for chest pain, palpitations and leg swelling. Gastrointestinal:  Negative for constipation, diarrhea, nausea and vomiting. Musculoskeletal:  Positive for arthralgias, back pain and myalgias. Neurological:  Negative for dizziness, syncope, weakness and headaches. Psychiatric/Behavioral:  Negative for confusion. Vitals:    01/18/23 0846 01/18/23 0920   BP: (!) 154/80 (!) 156/82   Site: Right Upper Arm Left Upper Arm   Position: Sitting Sitting   Cuff Size: Large Adult Large Adult   Pulse: 75    Resp: 16    Temp: 98.3 °F (36.8 °C)    TempSrc: Oral    SpO2: 95%    Weight: 253 lb (114.8 kg)    Height: 5' 9\" (1.753 m)        Body mass index is 37.36 kg/m². Wt Readings from Last 3 Encounters:   01/18/23 253 lb (114.8 kg)   07/18/22 244 lb 6.4 oz (110.9 kg)   04/18/22 254 lb (115.2 kg)       BP Readings from Last 3 Encounters:   01/18/23 (!) 154/80   07/18/22 122/70   04/18/22 128/78       Physical Exam  Vitals and nursing note reviewed. Constitutional:       General: He is not in acute distress. Appearance: He is well-developed. He is obese. HENT:      Head: Normocephalic and atraumatic. Cardiovascular:      Rate and Rhythm: Normal rate and regular rhythm. Heart sounds: Normal heart sounds. No murmur heard. No friction rub. No gallop. Pulmonary:      Effort: Pulmonary effort is normal. No respiratory distress. Breath sounds: Normal breath sounds. Musculoskeletal:      Cervical back: Neck supple. Right lower leg: No edema. Left lower leg: No edema. Skin:     General: Skin is warm and dry. Neurological:      Mental Status: He is alert and oriented to person, place, and time. Psychiatric:         Behavior: Behavior normal.         Thought Content:  Thought content normal.         Judgment: Judgment normal.       Assessmentand Plan  Melinda Woo was seen today for hypertension. Diagnoses and all orders for this visit:    Essential hypertension: uncontrolled off medication. Will restart. -     amLODIPine (NORVASC) 10 MG tablet; TAKE 1 TABLET BY MOUTH EVERY DAY  - HCTZ 25 mg daily. Advised to work on aerobic exercise and weight loss. Should bring in BP cuff at next visit to check accuracy. Hypercholesterolemia  On atorvastatin 40 mg daily  Advised to work on diet, aerobic exercise, and weight loss. Obesity: weight has increased from last visit  Diet, aerobic exercise, and weight loss discussed and needed. Elevated liver enzymes/ fatty liver   Repeat liver function   Advised to avoid alcohol. Discussed the importance of diet and weight loss. Arthritis of both knees  Advised to have another f/u with ortho to discuss treatment options. Colon cancer screening  -     Fecal DNA Colorectal cancer screening (Cologuard)  Declines screening colonoscopy. He reports that he will complete cologuard    Need for influenza vaccination  -     Influenza, FLUCELVAX, (age 10 mo+), IM, PF, 0.5 mL    Return in about 4 weeks (around 2/15/2023), or if symptoms worsen or fail to improve, for blood pressure.

## 2023-02-15 ENCOUNTER — OFFICE VISIT (OUTPATIENT)
Dept: FAMILY MEDICINE CLINIC | Age: 58
End: 2023-02-15
Payer: MEDICARE

## 2023-02-15 VITALS
SYSTOLIC BLOOD PRESSURE: 118 MMHG | HEIGHT: 69 IN | HEART RATE: 74 BPM | BODY MASS INDEX: 35.84 KG/M2 | DIASTOLIC BLOOD PRESSURE: 74 MMHG | OXYGEN SATURATION: 95 % | WEIGHT: 242 LBS | TEMPERATURE: 98.4 F | RESPIRATION RATE: 16 BRPM

## 2023-02-15 DIAGNOSIS — I10 ESSENTIAL HYPERTENSION: Primary | ICD-10-CM

## 2023-02-15 DIAGNOSIS — R74.8 ELEVATED LIVER ENZYMES: ICD-10-CM

## 2023-02-15 DIAGNOSIS — E78.00 HYPERCHOLESTEROLEMIA: ICD-10-CM

## 2023-02-15 DIAGNOSIS — K76.0 FATTY LIVER: ICD-10-CM

## 2023-02-15 DIAGNOSIS — E66.01 SEVERE OBESITY (BMI 35.0-39.9) WITH COMORBIDITY (HCC): ICD-10-CM

## 2023-02-15 PROCEDURE — G8482 FLU IMMUNIZE ORDER/ADMIN: HCPCS | Performed by: NURSE PRACTITIONER

## 2023-02-15 PROCEDURE — 1036F TOBACCO NON-USER: CPT | Performed by: NURSE PRACTITIONER

## 2023-02-15 PROCEDURE — 99214 OFFICE O/P EST MOD 30 MIN: CPT | Performed by: NURSE PRACTITIONER

## 2023-02-15 PROCEDURE — 3017F COLORECTAL CA SCREEN DOC REV: CPT | Performed by: NURSE PRACTITIONER

## 2023-02-15 PROCEDURE — G8427 DOCREV CUR MEDS BY ELIG CLIN: HCPCS | Performed by: NURSE PRACTITIONER

## 2023-02-15 PROCEDURE — G8417 CALC BMI ABV UP PARAM F/U: HCPCS | Performed by: NURSE PRACTITIONER

## 2023-02-15 PROCEDURE — 3074F SYST BP LT 130 MM HG: CPT | Performed by: NURSE PRACTITIONER

## 2023-02-15 PROCEDURE — 3078F DIAST BP <80 MM HG: CPT | Performed by: NURSE PRACTITIONER

## 2023-02-15 SDOH — ECONOMIC STABILITY: HOUSING INSECURITY
IN THE LAST 12 MONTHS, WAS THERE A TIME WHEN YOU DID NOT HAVE A STEADY PLACE TO SLEEP OR SLEPT IN A SHELTER (INCLUDING NOW)?: NO

## 2023-02-15 SDOH — ECONOMIC STABILITY: FOOD INSECURITY: WITHIN THE PAST 12 MONTHS, THE FOOD YOU BOUGHT JUST DIDN'T LAST AND YOU DIDN'T HAVE MONEY TO GET MORE.: NEVER TRUE

## 2023-02-15 SDOH — ECONOMIC STABILITY: INCOME INSECURITY: HOW HARD IS IT FOR YOU TO PAY FOR THE VERY BASICS LIKE FOOD, HOUSING, MEDICAL CARE, AND HEATING?: NOT HARD AT ALL

## 2023-02-15 SDOH — ECONOMIC STABILITY: FOOD INSECURITY: WITHIN THE PAST 12 MONTHS, YOU WORRIED THAT YOUR FOOD WOULD RUN OUT BEFORE YOU GOT MONEY TO BUY MORE.: NEVER TRUE

## 2023-02-15 ASSESSMENT — ENCOUNTER SYMPTOMS
CHEST TIGHTNESS: 0
COUGH: 1
SHORTNESS OF BREATH: 0
CONSTIPATION: 0
BACK PAIN: 1
VOMITING: 0
NAUSEA: 0
DIARRHEA: 0

## 2023-02-15 NOTE — PROGRESS NOTES
2/15/2023    This is a 59 y.o. male   Chief Complaint   Patient presents with    Hypertension     Has not been checking bp. Felt a little off when he first started bp meds but has gotten better. Other     Has had coughing since he had the flu shot in January. Sneezing. Aniya ALLEN  Hypertension:  Home blood pressure monitoring: no.  He is not adherent to a low sodium diet. Patient denies chest pain, shortness of breath, headache, peripheral edema and palpitations. Antihypertensive medication side effects: no medication side effects noted. On HCTZ 25 mg daily and amlodipine 10 mg daily                                      Sodium (mmol/L)   Date Value   01/18/2023 140    BUN (mg/dL)   Date Value   01/18/2023 11    Glucose (mg/dL)   Date Value   01/18/2023 96      Potassium (mmol/L)   Date Value   01/18/2023 3.5    Creatinine (mg/dL)   Date Value   01/18/2023 1.0         Hyperlipidemia:  No new myalgias or GI upset on atorvastatin (Lipitor). Medication compliance: compliant most of the time. Patient is not following a low fat, low cholesterol diet. He is not exercising regularly. Lab Results   Component Value Date    CHOL 200 (H) 07/19/2022    TRIG 78 07/19/2022    HDL 47 07/19/2022    LDLCALC 137 (H) 07/19/2022     Lab Results   Component Value Date    ALT 54 (H) 01/18/2023    AST 35 01/18/2023        Obesity- no routine diet or exercise. Has been decreasing bread intake. Continues to follow with pain management [facility] for chronic pain in right foot and low back pain. Prescribed lyrica TID and norco TID from pain specialist.  Patient reports that he can take brand-name Lyrica but not able to take the generic due to allergy. Patient reports these pain medications help decrease his pain slightly. Had epidural injection in low back without improvement in pain. Has not had another epidural injection. Has not had f/u with ortho for arianna knee arthritis.  Tried cortisone injection in the past without improvement in symptoms. History of elevated liver enzymes. U/s showed fatty liver. Reports that he has GI evaluation with Dr. Lisseth Price and told to monitor. Reports that he has cut down his alcohol drinks to 2 per week. Patient reports that since last visit has had coughing and sneezing. Took benadryl and felt better. Denies shortness of breath. Denies fever. Patient Active Problem List   Diagnosis    BWC Fracture of phalanx of right foot, open    BWC Open fracture of phalanx of lesser toe of right foot    BWC Sprain of right foot    BWC Crushing injury of right foot    BWC Contusion of right foot    Complex regional pain syndrome type 1 of right lower extremity    Essential hypertension    Obesity, Class II, BMI 35-39.9    Hypercholesterolemia    Sebaceous cyst    Elevated liver enzymes    Fatty liver- u/s 10/2021    Primary osteoarthritis of both knees          Current Outpatient Medications   Medication Sig Dispense Refill    hydroCHLOROthiazide (HYDRODIURIL) 25 MG tablet Take 1 tablet by mouth every morning For high blood pressure. 90 tablet 0    amLODIPine (NORVASC) 10 MG tablet TAKE 1 TABLET BY MOUTH EVERY DAY 90 tablet 0    atorvastatin (LIPITOR) 40 MG tablet TAKE 1 TABLET BY MOUTH EVERY DAY 90 tablet 1    sildenafil (REVATIO) 20 MG tablet Take 1-3 tablets by mouth daily as needed (ED) 21 tablet 0    diclofenac (VOLTAREN) 75 MG EC tablet TAKE 1 TABLET BY MOUTH TWICE A DAY 60 tablet 3    baclofen (LIORESAL) 20 MG tablet       HYDROcodone-acetaminophen (NORCO) 5-325 MG per tablet Take 1 tablet by mouth every 8 hours as needed. LYRICA 100 MG capsule Take 100 mg by mouth 3 times daily. Blood Pressure Monitoring (BLOOD PRESSURE CUFF) MISC Use to check BP daily 1 each 0     No current facility-administered medications for this visit.        Allergies   Allergen Reactions    Lyrica [Pregabalin] Hives    Tramadol Itching    Cymbalta [Duloxetine Hcl] Itching and Rash Review of Systems   Constitutional:  Negative for activity change and fever. HENT:  Positive for sneezing. Respiratory:  Positive for cough. Negative for chest tightness and shortness of breath. Cardiovascular:  Negative for chest pain, palpitations and leg swelling. Gastrointestinal:  Negative for constipation, diarrhea, nausea and vomiting. Musculoskeletal:  Positive for arthralgias, back pain and myalgias. Neurological:  Negative for dizziness, syncope, weakness and headaches. Psychiatric/Behavioral:  Negative for confusion. Vitals:    02/15/23 0831   BP: 118/74   Site: Right Upper Arm   Position: Sitting   Cuff Size: Medium Adult   Pulse: 74   Resp: 16   Temp: 98.4 °F (36.9 °C)   TempSrc: Oral   SpO2: 95%   Weight: 242 lb (109.8 kg)   Height: 5' 9\" (1.753 m)       Body mass index is 35.74 kg/m². Wt Readings from Last 3 Encounters:   02/15/23 242 lb (109.8 kg)   01/18/23 253 lb (114.8 kg)   07/18/22 244 lb 6.4 oz (110.9 kg)       BP Readings from Last 3 Encounters:   02/15/23 118/74   01/18/23 (!) 156/82   07/18/22 122/70       Physical Exam  Vitals and nursing note reviewed. Constitutional:       General: He is not in acute distress. Appearance: He is well-developed. He is obese. HENT:      Head: Normocephalic and atraumatic. Cardiovascular:      Rate and Rhythm: Normal rate and regular rhythm. Heart sounds: Normal heart sounds. No murmur heard. No friction rub. No gallop. Pulmonary:      Effort: Pulmonary effort is normal. No respiratory distress. Breath sounds: Normal breath sounds. Musculoskeletal:      Cervical back: Neck supple. Right lower leg: No edema. Left lower leg: No edema. Skin:     General: Skin is warm and dry. Neurological:      Mental Status: He is alert and oriented to person, place, and time. Psychiatric:         Behavior: Behavior normal.         Thought Content:  Thought content normal.         Judgment: Judgment normal.       Assessmentand Plan  Pearl Prado was seen today for hypertension. Diagnoses and all orders for this visit:    Essential hypertension: controlled   -     amLODIPine (NORVASC) 10 MG tablet; TAKE 1 TABLET BY MOUTH EVERY DAY  - HCTZ 25 mg daily. Advised to work on aerobic exercise and weight loss. Hypercholesterolemia  On atorvastatin 40 mg daily  Advised to work on diet, aerobic exercise, and weight loss. Obesity: weight has decreased from last visit  Diet, aerobic exercise, and weight loss discussed and needed. Elevated liver enzymes/ fatty liver   Advised to avoid alcohol. Discussed the importance of diet and weight loss. Arthritis of both knees  Advised to have another f/u with ortho to discuss treatment options. Colon cancer screening  -     Fecal DNA Colorectal cancer screening (Cologuard)  Declines screening colonoscopy. He reports that he will complete cologuard  He reports that he has the kit at home and will complete    Suspect that his recent coughing and sneezing is r/t allergy. Advised that he continue with daily antihistamine such as claritin or zyrtec. He is to let me know if symptoms worsen or fail to improve with medication. Return in about 5 months (around 7/20/2023), or if symptoms worsen or fail to improve, for AWV.

## 2023-04-24 DIAGNOSIS — E78.00 HYPERCHOLESTEROLEMIA: ICD-10-CM

## 2023-04-24 DIAGNOSIS — I10 ESSENTIAL HYPERTENSION: ICD-10-CM

## 2023-04-24 RX ORDER — AMLODIPINE BESYLATE 10 MG/1
TABLET ORAL
Qty: 90 TABLET | Refills: 0 | Status: SHIPPED | OUTPATIENT
Start: 2023-04-24

## 2023-04-24 RX ORDER — HYDROCHLOROTHIAZIDE 25 MG/1
25 TABLET ORAL EVERY MORNING
Qty: 90 TABLET | Refills: 0 | Status: SHIPPED | OUTPATIENT
Start: 2023-04-24

## 2023-04-24 RX ORDER — ATORVASTATIN CALCIUM 40 MG/1
40 TABLET, FILM COATED ORAL DAILY
Qty: 90 TABLET | Refills: 0 | Status: SHIPPED | OUTPATIENT
Start: 2023-04-24

## 2023-07-20 ENCOUNTER — OFFICE VISIT (OUTPATIENT)
Dept: FAMILY MEDICINE CLINIC | Age: 58
End: 2023-07-20
Payer: MEDICARE

## 2023-07-20 VITALS
BODY MASS INDEX: 35.6 KG/M2 | HEART RATE: 64 BPM | SYSTOLIC BLOOD PRESSURE: 128 MMHG | RESPIRATION RATE: 16 BRPM | DIASTOLIC BLOOD PRESSURE: 60 MMHG | OXYGEN SATURATION: 95 % | TEMPERATURE: 98.3 F | WEIGHT: 240.4 LBS | HEIGHT: 69 IN

## 2023-07-20 DIAGNOSIS — I10 ESSENTIAL HYPERTENSION: ICD-10-CM

## 2023-07-20 DIAGNOSIS — K76.0 FATTY LIVER: ICD-10-CM

## 2023-07-20 DIAGNOSIS — E78.00 HYPERCHOLESTEROLEMIA: ICD-10-CM

## 2023-07-20 DIAGNOSIS — Z00.00 INITIAL MEDICARE ANNUAL WELLNESS VISIT: ICD-10-CM

## 2023-07-20 DIAGNOSIS — R74.8 ELEVATED LIVER ENZYMES: ICD-10-CM

## 2023-07-20 DIAGNOSIS — Z12.5 SCREENING PSA (PROSTATE SPECIFIC ANTIGEN): ICD-10-CM

## 2023-07-20 DIAGNOSIS — Z00.00 INITIAL MEDICARE ANNUAL WELLNESS VISIT: Primary | ICD-10-CM

## 2023-07-20 DIAGNOSIS — E66.01 SEVERE OBESITY (BMI 35.0-39.9) WITH COMORBIDITY (HCC): ICD-10-CM

## 2023-07-20 DIAGNOSIS — E87.6 HYPOKALEMIA: Primary | ICD-10-CM

## 2023-07-20 LAB
ALBUMIN SERPL-MCNC: 4.4 G/DL (ref 3.4–5)
ALBUMIN/GLOB SERPL: 1.4 {RATIO} (ref 1.1–2.2)
ALP SERPL-CCNC: 88 U/L (ref 40–129)
ALT SERPL-CCNC: 38 U/L (ref 10–40)
ANION GAP SERPL CALCULATED.3IONS-SCNC: 17 MMOL/L (ref 3–16)
AST SERPL-CCNC: 34 U/L (ref 15–37)
BASOPHILS # BLD: 0.1 K/UL (ref 0–0.2)
BASOPHILS NFR BLD: 0.6 %
BILIRUB SERPL-MCNC: 0.6 MG/DL (ref 0–1)
BUN SERPL-MCNC: 11 MG/DL (ref 7–20)
CALCIUM SERPL-MCNC: 9.8 MG/DL (ref 8.3–10.6)
CHLORIDE SERPL-SCNC: 101 MMOL/L (ref 99–110)
CHOLEST SERPL-MCNC: 190 MG/DL (ref 0–199)
CO2 SERPL-SCNC: 24 MMOL/L (ref 21–32)
CREAT SERPL-MCNC: 1.1 MG/DL (ref 0.8–1.3)
DEPRECATED RDW RBC AUTO: 13.5 % (ref 12.4–15.4)
EOSINOPHIL # BLD: 0.1 K/UL (ref 0–0.6)
EOSINOPHIL NFR BLD: 1 %
GFR SERPLBLD CREATININE-BSD FMLA CKD-EPI: >60 ML/MIN/{1.73_M2}
GLUCOSE SERPL-MCNC: 106 MG/DL (ref 70–99)
HCT VFR BLD AUTO: 49.3 % (ref 40.5–52.5)
HDLC SERPL-MCNC: 52 MG/DL (ref 40–60)
HGB BLD-MCNC: 16.7 G/DL (ref 13.5–17.5)
LDLC SERPL CALC-MCNC: 120 MG/DL
LYMPHOCYTES # BLD: 1.9 K/UL (ref 1–5.1)
LYMPHOCYTES NFR BLD: 20.7 %
MCH RBC QN AUTO: 30.2 PG (ref 26–34)
MCHC RBC AUTO-ENTMCNC: 33.9 G/DL (ref 31–36)
MCV RBC AUTO: 89.1 FL (ref 80–100)
MONOCYTES # BLD: 0.6 K/UL (ref 0–1.3)
MONOCYTES NFR BLD: 7.2 %
NEUTROPHILS # BLD: 6.3 K/UL (ref 1.7–7.7)
NEUTROPHILS NFR BLD: 70.5 %
PLATELET # BLD AUTO: 245 K/UL (ref 135–450)
PMV BLD AUTO: 9 FL (ref 5–10.5)
POTASSIUM SERPL-SCNC: 3.1 MMOL/L (ref 3.5–5.1)
PROT SERPL-MCNC: 7.5 G/DL (ref 6.4–8.2)
PSA SERPL DL<=0.01 NG/ML-MCNC: 1.33 NG/ML (ref 0–4)
RBC # BLD AUTO: 5.53 M/UL (ref 4.2–5.9)
SODIUM SERPL-SCNC: 142 MMOL/L (ref 136–145)
TRIGL SERPL-MCNC: 91 MG/DL (ref 0–150)
TSH SERPL DL<=0.005 MIU/L-ACNC: 2.67 UIU/ML (ref 0.27–4.2)
VLDLC SERPL CALC-MCNC: 18 MG/DL
WBC # BLD AUTO: 9 K/UL (ref 4–11)

## 2023-07-20 PROCEDURE — 3074F SYST BP LT 130 MM HG: CPT | Performed by: NURSE PRACTITIONER

## 2023-07-20 PROCEDURE — 99213 OFFICE O/P EST LOW 20 MIN: CPT | Performed by: NURSE PRACTITIONER

## 2023-07-20 PROCEDURE — 3078F DIAST BP <80 MM HG: CPT | Performed by: NURSE PRACTITIONER

## 2023-07-20 PROCEDURE — G8427 DOCREV CUR MEDS BY ELIG CLIN: HCPCS | Performed by: NURSE PRACTITIONER

## 2023-07-20 PROCEDURE — 1036F TOBACCO NON-USER: CPT | Performed by: NURSE PRACTITIONER

## 2023-07-20 PROCEDURE — 3017F COLORECTAL CA SCREEN DOC REV: CPT | Performed by: NURSE PRACTITIONER

## 2023-07-20 PROCEDURE — G8417 CALC BMI ABV UP PARAM F/U: HCPCS | Performed by: NURSE PRACTITIONER

## 2023-07-20 PROCEDURE — G0438 PPPS, INITIAL VISIT: HCPCS | Performed by: NURSE PRACTITIONER

## 2023-07-20 ASSESSMENT — PATIENT HEALTH QUESTIONNAIRE - PHQ9
SUM OF ALL RESPONSES TO PHQ QUESTIONS 1-9: 0
2. FEELING DOWN, DEPRESSED OR HOPELESS: 0
SUM OF ALL RESPONSES TO PHQ QUESTIONS 1-9: 0
SUM OF ALL RESPONSES TO PHQ QUESTIONS 1-9: 0
SUM OF ALL RESPONSES TO PHQ9 QUESTIONS 1 & 2: 0
SUM OF ALL RESPONSES TO PHQ QUESTIONS 1-9: 0
1. LITTLE INTEREST OR PLEASURE IN DOING THINGS: 0

## 2023-07-20 ASSESSMENT — LIFESTYLE VARIABLES
HOW OFTEN DO YOU HAVE A DRINK CONTAINING ALCOHOL: 2-3 TIMES A WEEK
HOW MANY STANDARD DRINKS CONTAINING ALCOHOL DO YOU HAVE ON A TYPICAL DAY: 1 OR 2

## 2023-07-20 NOTE — PROGRESS NOTES
Medicare Annual Wellness Visit    Geovanni Plaza is here for Medicare AWV (Still having knee pain. Has not been checking bp.)    Assessment & Plan   Initial Medicare annual wellness visit  -     Comprehensive Metabolic Panel; Future  -     TSH with Reflex; Future  -     CBC with Auto Differential; Future  -     Lipid Panel; Future  -     PSA Screening; Future  Healthy lifestyles reviewed: diet, aerobic exercise, sunscreen, vision and dental exams. Advised to completed cologuard that has been ordered in the past. Patient declines screening colonoscopy     Advised to obtain shingles vaccine at his pharmacy. Essential hypertension  -     Comprehensive Metabolic Panel; Future  Controlled on amlodipine 10 mg daily and HCTZ 25 mg daily     Hypercholesterolemia  -     Comprehensive Metabolic Panel; Future  -     Lipid Panel; Future  On atorvastatin 40 mg daily     Severe obesity (BMI 35.0-39. 9) with comorbidity ([address])  Uncontrolled, but weight has decreased from last visit. Advised to continue to work on diet, aerobic exercise, and weight loss. Elevated liver enzymes/ Fatty liver- u/s 10/2021  -     Comprehensive Metabolic Panel; Future  Advised diet, aerobic exercise, and weight loss  Advised to decrease alcohol use    Screening PSA (prostate specific antigen)  -     PSA Screening; Future    Recommendations for Preventive Services Due: see orders and patient instructions/AVS.  Recommended screening schedule for the next 5-10 years is provided to the patient in written form: see Patient Instructions/AVS.     Return in about 6 months (around 1/20/2024), or if symptoms worsen or fail to improve, for chronic conditions. Subjective     Hypertension:  Home blood pressure monitoring: No.  He is not adherent to a low sodium diet. Patient denies chest pain, shortness of breath, headache, lightheadedness, and palpitations. Antihypertensive medication side effects: no medication side effects noted.       On HCTZ

## 2023-08-16 DIAGNOSIS — E78.00 HYPERCHOLESTEROLEMIA: ICD-10-CM

## 2023-08-16 RX ORDER — ATORVASTATIN CALCIUM 40 MG/1
40 TABLET, FILM COATED ORAL DAILY
Qty: 90 TABLET | Refills: 1 | Status: SHIPPED | OUTPATIENT
Start: 2023-08-16

## 2023-08-23 DIAGNOSIS — I10 ESSENTIAL HYPERTENSION: ICD-10-CM

## 2023-08-23 RX ORDER — AMLODIPINE BESYLATE 10 MG/1
TABLET ORAL
Qty: 90 TABLET | Refills: 0 | Status: SHIPPED | OUTPATIENT
Start: 2023-08-23

## 2023-08-23 RX ORDER — HYDROCHLOROTHIAZIDE 25 MG/1
25 TABLET ORAL EVERY MORNING
Qty: 90 TABLET | Refills: 0 | OUTPATIENT
Start: 2023-08-23

## 2023-08-23 NOTE — TELEPHONE ENCOUNTER
Patient needs to have BMP repeated for potassium level before the HCTZ is refilled. Order has previously been placed.

## 2023-08-24 DIAGNOSIS — I10 ESSENTIAL HYPERTENSION: ICD-10-CM

## 2023-08-24 RX ORDER — HYDROCHLOROTHIAZIDE 25 MG/1
25 TABLET ORAL EVERY MORNING
Qty: 90 TABLET | Refills: 0 | OUTPATIENT
Start: 2023-08-24

## 2023-11-14 DIAGNOSIS — I10 ESSENTIAL HYPERTENSION: ICD-10-CM

## 2023-11-14 RX ORDER — AMLODIPINE BESYLATE 10 MG/1
TABLET ORAL
Qty: 90 TABLET | Refills: 0 | Status: SHIPPED | OUTPATIENT
Start: 2023-11-14

## 2024-01-22 ENCOUNTER — OFFICE VISIT (OUTPATIENT)
Dept: FAMILY MEDICINE CLINIC | Age: 59
End: 2024-01-22
Payer: MEDICARE

## 2024-01-22 VITALS
WEIGHT: 256 LBS | HEART RATE: 70 BPM | DIASTOLIC BLOOD PRESSURE: 72 MMHG | OXYGEN SATURATION: 97 % | HEIGHT: 69 IN | SYSTOLIC BLOOD PRESSURE: 124 MMHG | BODY MASS INDEX: 37.92 KG/M2

## 2024-01-22 DIAGNOSIS — K76.0 FATTY LIVER: ICD-10-CM

## 2024-01-22 DIAGNOSIS — R74.8 ELEVATED LIVER ENZYMES: ICD-10-CM

## 2024-01-22 DIAGNOSIS — I10 ESSENTIAL HYPERTENSION: ICD-10-CM

## 2024-01-22 DIAGNOSIS — E78.00 HYPERCHOLESTEROLEMIA: ICD-10-CM

## 2024-01-22 DIAGNOSIS — E66.01 SEVERE OBESITY (BMI 35.0-39.9) WITH COMORBIDITY (HCC): ICD-10-CM

## 2024-01-22 DIAGNOSIS — Z23 NEED FOR INFLUENZA VACCINATION: ICD-10-CM

## 2024-01-22 LAB
ALBUMIN SERPL-MCNC: 4.2 G/DL (ref 3.4–5)
ALBUMIN/GLOB SERPL: 1.2 {RATIO} (ref 1.1–2.2)
ALP SERPL-CCNC: 89 U/L (ref 40–129)
ALT SERPL-CCNC: 36 U/L (ref 10–40)
ANION GAP SERPL CALCULATED.3IONS-SCNC: 11 MMOL/L (ref 3–16)
AST SERPL-CCNC: 33 U/L (ref 15–37)
BILIRUB SERPL-MCNC: 0.6 MG/DL (ref 0–1)
BUN SERPL-MCNC: 11 MG/DL (ref 7–20)
CALCIUM SERPL-MCNC: 9.2 MG/DL (ref 8.3–10.6)
CHLORIDE SERPL-SCNC: 103 MMOL/L (ref 99–110)
CHOLEST SERPL-MCNC: 182 MG/DL (ref 0–199)
CO2 SERPL-SCNC: 28 MMOL/L (ref 21–32)
CREAT SERPL-MCNC: 1.1 MG/DL (ref 0.8–1.3)
GFR SERPLBLD CREATININE-BSD FMLA CKD-EPI: >60 ML/MIN/{1.73_M2}
GLUCOSE SERPL-MCNC: 91 MG/DL (ref 70–99)
HDLC SERPL-MCNC: 50 MG/DL (ref 40–60)
LDLC SERPL CALC-MCNC: 114 MG/DL
POTASSIUM SERPL-SCNC: 4 MMOL/L (ref 3.5–5.1)
PROT SERPL-MCNC: 7.8 G/DL (ref 6.4–8.2)
SODIUM SERPL-SCNC: 142 MMOL/L (ref 136–145)
TRIGL SERPL-MCNC: 88 MG/DL (ref 0–150)
VLDLC SERPL CALC-MCNC: 18 MG/DL

## 2024-01-22 PROCEDURE — G8427 DOCREV CUR MEDS BY ELIG CLIN: HCPCS | Performed by: NURSE PRACTITIONER

## 2024-01-22 PROCEDURE — 90674 CCIIV4 VAC NO PRSV 0.5 ML IM: CPT | Performed by: NURSE PRACTITIONER

## 2024-01-22 PROCEDURE — 3017F COLORECTAL CA SCREEN DOC REV: CPT | Performed by: NURSE PRACTITIONER

## 2024-01-22 PROCEDURE — G8417 CALC BMI ABV UP PARAM F/U: HCPCS | Performed by: NURSE PRACTITIONER

## 2024-01-22 PROCEDURE — 99214 OFFICE O/P EST MOD 30 MIN: CPT | Performed by: NURSE PRACTITIONER

## 2024-01-22 PROCEDURE — G8482 FLU IMMUNIZE ORDER/ADMIN: HCPCS | Performed by: NURSE PRACTITIONER

## 2024-01-22 PROCEDURE — 1036F TOBACCO NON-USER: CPT | Performed by: NURSE PRACTITIONER

## 2024-01-22 PROCEDURE — 3078F DIAST BP <80 MM HG: CPT | Performed by: NURSE PRACTITIONER

## 2024-01-22 PROCEDURE — G0008 ADMIN INFLUENZA VIRUS VAC: HCPCS | Performed by: NURSE PRACTITIONER

## 2024-01-22 PROCEDURE — 3074F SYST BP LT 130 MM HG: CPT | Performed by: NURSE PRACTITIONER

## 2024-01-22 RX ORDER — AMLODIPINE BESYLATE 10 MG/1
10 TABLET ORAL DAILY
Qty: 90 TABLET | Refills: 1 | Status: SHIPPED | OUTPATIENT
Start: 2024-01-22

## 2024-01-22 RX ORDER — ATORVASTATIN CALCIUM 40 MG/1
40 TABLET, FILM COATED ORAL DAILY
Qty: 90 TABLET | Refills: 1 | Status: SHIPPED | OUTPATIENT
Start: 2024-01-22

## 2024-01-22 ASSESSMENT — PATIENT HEALTH QUESTIONNAIRE - PHQ9
SUM OF ALL RESPONSES TO PHQ9 QUESTIONS 1 & 2: 0
SUM OF ALL RESPONSES TO PHQ QUESTIONS 1-9: 0
SUM OF ALL RESPONSES TO PHQ QUESTIONS 1-9: 0
2. FEELING DOWN, DEPRESSED OR HOPELESS: 0
SUM OF ALL RESPONSES TO PHQ QUESTIONS 1-9: 0
SUM OF ALL RESPONSES TO PHQ QUESTIONS 1-9: 0
1. LITTLE INTEREST OR PLEASURE IN DOING THINGS: 0

## 2024-01-22 ASSESSMENT — ENCOUNTER SYMPTOMS
VOMITING: 0
CHEST TIGHTNESS: 0
DIARRHEA: 0
NAUSEA: 0
CONSTIPATION: 0
COUGH: 0

## 2024-01-22 NOTE — PROGRESS NOTES
Reports that he has cut down his alcohol drinks to 4 per week.       Patient Active Problem List   Diagnosis    BWC Fracture of phalanx of right foot, open    BWC Open fracture of phalanx of lesser toe of right foot    BWC Sprain of right foot    BWC Crushing injury of right foot    BWC Contusion of right foot    Complex regional pain syndrome type 1 of right lower extremity    Essential hypertension    Obesity, Class II, BMI 35-39.9    Hypercholesterolemia    Sebaceous cyst    Elevated liver enzymes    Fatty liver- u/s 10/2021    Primary osteoarthritis of both knees          Current Outpatient Medications   Medication Sig Dispense Refill    amLODIPine (NORVASC) 10 MG tablet TAKE 1 TABLET BY MOUTH EVERY DAY 90 tablet 0    atorvastatin (LIPITOR) 40 MG tablet TAKE 1 TABLET BY MOUTH DAILY 90 tablet 1    sildenafil (REVATIO) 20 MG tablet Take 1-3 tablets by mouth daily as needed (ED) 21 tablet 0    baclofen (LIORESAL) 20 MG tablet       HYDROcodone-acetaminophen (NORCO) 5-325 MG per tablet Take 1 tablet by mouth every 8 hours as needed.      LYRICA 100 MG capsule Take 1 capsule by mouth 3 times daily.      Blood Pressure Monitoring (BLOOD PRESSURE CUFF) MISC Use to check BP daily 1 each 0    hydroCHLOROthiazide (HYDRODIURIL) 25 MG tablet Take 1 tablet by mouth every morning For high blood pressure. (Patient not taking: Reported on 1/22/2024) 90 tablet 0     No current facility-administered medications for this visit.       Allergies   Allergen Reactions    Lyrica [Pregabalin] Hives     Generic lyrica    Tramadol Itching    Cymbalta [Duloxetine Hcl] Itching and Rash       Review of Systems   Constitutional:  Negative for activity change.   Respiratory:  Negative for cough, chest tightness and shortness of breath.    Cardiovascular:  Negative for chest pain.   Gastrointestinal:  Negative for constipation, diarrhea, nausea and vomiting.   Neurological:  Negative for dizziness, syncope, weakness and headaches.

## 2024-03-06 DIAGNOSIS — E78.00 HYPERCHOLESTEROLEMIA: ICD-10-CM

## 2024-03-06 DIAGNOSIS — I10 ESSENTIAL HYPERTENSION: ICD-10-CM

## 2024-03-06 RX ORDER — ATORVASTATIN CALCIUM 40 MG/1
40 TABLET, FILM COATED ORAL DAILY
Qty: 90 TABLET | Refills: 1 | Status: SHIPPED | OUTPATIENT
Start: 2024-03-06

## 2024-03-06 RX ORDER — AMLODIPINE BESYLATE 10 MG/1
10 TABLET ORAL DAILY
Qty: 90 TABLET | Refills: 1 | OUTPATIENT
Start: 2024-03-06

## 2024-07-29 ENCOUNTER — OFFICE VISIT (OUTPATIENT)
Dept: FAMILY MEDICINE CLINIC | Age: 59
End: 2024-07-29
Payer: MEDICARE

## 2024-07-29 VITALS
BODY MASS INDEX: 35.6 KG/M2 | RESPIRATION RATE: 18 BRPM | TEMPERATURE: 98.2 F | SYSTOLIC BLOOD PRESSURE: 152 MMHG | HEART RATE: 53 BPM | OXYGEN SATURATION: 96 % | HEIGHT: 69 IN | WEIGHT: 240.4 LBS | DIASTOLIC BLOOD PRESSURE: 62 MMHG

## 2024-07-29 DIAGNOSIS — G89.29 CHRONIC RIGHT-SIDED LOW BACK PAIN WITH RIGHT-SIDED SCIATICA: ICD-10-CM

## 2024-07-29 DIAGNOSIS — I10 ESSENTIAL HYPERTENSION: ICD-10-CM

## 2024-07-29 DIAGNOSIS — G89.4 CHRONIC PAIN SYNDROME: ICD-10-CM

## 2024-07-29 DIAGNOSIS — E78.00 HYPERCHOLESTEROLEMIA: ICD-10-CM

## 2024-07-29 DIAGNOSIS — G89.29 CHRONIC PAIN IN RIGHT FOOT: ICD-10-CM

## 2024-07-29 DIAGNOSIS — M17.0 ARTHRITIS OF BOTH KNEES: ICD-10-CM

## 2024-07-29 DIAGNOSIS — Z12.5 SCREENING PSA (PROSTATE SPECIFIC ANTIGEN): ICD-10-CM

## 2024-07-29 DIAGNOSIS — E66.01 SEVERE OBESITY (BMI 35.0-39.9) WITH COMORBIDITY (HCC): ICD-10-CM

## 2024-07-29 DIAGNOSIS — Z00.00 MEDICARE ANNUAL WELLNESS VISIT, SUBSEQUENT: Primary | ICD-10-CM

## 2024-07-29 DIAGNOSIS — Z12.11 COLON CANCER SCREENING: ICD-10-CM

## 2024-07-29 DIAGNOSIS — K76.0 FATTY LIVER: ICD-10-CM

## 2024-07-29 DIAGNOSIS — R74.8 ELEVATED LIVER ENZYMES: ICD-10-CM

## 2024-07-29 DIAGNOSIS — M54.41 CHRONIC RIGHT-SIDED LOW BACK PAIN WITH RIGHT-SIDED SCIATICA: ICD-10-CM

## 2024-07-29 DIAGNOSIS — Z00.00 MEDICARE ANNUAL WELLNESS VISIT, SUBSEQUENT: ICD-10-CM

## 2024-07-29 DIAGNOSIS — M79.671 CHRONIC PAIN IN RIGHT FOOT: ICD-10-CM

## 2024-07-29 LAB
ALBUMIN SERPL-MCNC: 4.4 G/DL (ref 3.4–5)
ALBUMIN/GLOB SERPL: 1.4 {RATIO} (ref 1.1–2.2)
ALP SERPL-CCNC: 97 U/L (ref 40–129)
ALT SERPL-CCNC: 39 U/L (ref 10–40)
ANION GAP SERPL CALCULATED.3IONS-SCNC: 14 MMOL/L (ref 3–16)
AST SERPL-CCNC: 31 U/L (ref 15–37)
BASOPHILS # BLD: 0.1 K/UL (ref 0–0.2)
BASOPHILS NFR BLD: 0.8 %
BILIRUB SERPL-MCNC: 0.6 MG/DL (ref 0–1)
BUN SERPL-MCNC: 12 MG/DL (ref 7–20)
CALCIUM SERPL-MCNC: 9.5 MG/DL (ref 8.3–10.6)
CHLORIDE SERPL-SCNC: 103 MMOL/L (ref 99–110)
CO2 SERPL-SCNC: 26 MMOL/L (ref 21–32)
CREAT SERPL-MCNC: 1.2 MG/DL (ref 0.8–1.3)
DEPRECATED RDW RBC AUTO: 13.7 % (ref 12.4–15.4)
EOSINOPHIL # BLD: 0.1 K/UL (ref 0–0.6)
EOSINOPHIL NFR BLD: 1.4 %
GFR SERPLBLD CREATININE-BSD FMLA CKD-EPI: 69 ML/MIN/{1.73_M2}
GLUCOSE SERPL-MCNC: 104 MG/DL (ref 70–99)
HCT VFR BLD AUTO: 50.8 % (ref 40.5–52.5)
HGB BLD-MCNC: 17.2 G/DL (ref 13.5–17.5)
LYMPHOCYTES # BLD: 1.7 K/UL (ref 1–5.1)
LYMPHOCYTES NFR BLD: 19.4 %
MCH RBC QN AUTO: 30.8 PG (ref 26–34)
MCHC RBC AUTO-ENTMCNC: 33.8 G/DL (ref 31–36)
MCV RBC AUTO: 91.1 FL (ref 80–100)
MONOCYTES # BLD: 0.5 K/UL (ref 0–1.3)
MONOCYTES NFR BLD: 6.2 %
NEUTROPHILS # BLD: 6.2 K/UL (ref 1.7–7.7)
NEUTROPHILS NFR BLD: 72.2 %
PLATELET # BLD AUTO: 279 K/UL (ref 135–450)
PMV BLD AUTO: 8.9 FL (ref 5–10.5)
POTASSIUM SERPL-SCNC: 3.1 MMOL/L (ref 3.5–5.1)
PROT SERPL-MCNC: 7.6 G/DL (ref 6.4–8.2)
PSA SERPL DL<=0.01 NG/ML-MCNC: 1.02 NG/ML (ref 0–4)
RBC # BLD AUTO: 5.58 M/UL (ref 4.2–5.9)
SODIUM SERPL-SCNC: 143 MMOL/L (ref 136–145)
TSH SERPL DL<=0.005 MIU/L-ACNC: 2.16 UIU/ML (ref 0.27–4.2)
WBC # BLD AUTO: 8.6 K/UL (ref 4–11)

## 2024-07-29 PROCEDURE — 3078F DIAST BP <80 MM HG: CPT | Performed by: NURSE PRACTITIONER

## 2024-07-29 PROCEDURE — 99213 OFFICE O/P EST LOW 20 MIN: CPT | Performed by: NURSE PRACTITIONER

## 2024-07-29 PROCEDURE — G8417 CALC BMI ABV UP PARAM F/U: HCPCS | Performed by: NURSE PRACTITIONER

## 2024-07-29 PROCEDURE — 1036F TOBACCO NON-USER: CPT | Performed by: NURSE PRACTITIONER

## 2024-07-29 PROCEDURE — G0439 PPPS, SUBSEQ VISIT: HCPCS | Performed by: NURSE PRACTITIONER

## 2024-07-29 PROCEDURE — 3017F COLORECTAL CA SCREEN DOC REV: CPT | Performed by: NURSE PRACTITIONER

## 2024-07-29 PROCEDURE — 3077F SYST BP >= 140 MM HG: CPT | Performed by: NURSE PRACTITIONER

## 2024-07-29 PROCEDURE — G8427 DOCREV CUR MEDS BY ELIG CLIN: HCPCS | Performed by: NURSE PRACTITIONER

## 2024-07-29 RX ORDER — AMLODIPINE BESYLATE 10 MG/1
10 TABLET ORAL DAILY
Qty: 90 TABLET | Refills: 1 | Status: SHIPPED | OUTPATIENT
Start: 2024-07-29

## 2024-07-29 RX ORDER — ATORVASTATIN CALCIUM 40 MG/1
40 TABLET, FILM COATED ORAL DAILY
Qty: 90 TABLET | Refills: 1 | Status: SHIPPED | OUTPATIENT
Start: 2024-07-29

## 2024-07-29 SDOH — ECONOMIC STABILITY: FOOD INSECURITY: WITHIN THE PAST 12 MONTHS, THE FOOD YOU BOUGHT JUST DIDN'T LAST AND YOU DIDN'T HAVE MONEY TO GET MORE.: NEVER TRUE

## 2024-07-29 SDOH — ECONOMIC STABILITY: INCOME INSECURITY: HOW HARD IS IT FOR YOU TO PAY FOR THE VERY BASICS LIKE FOOD, HOUSING, MEDICAL CARE, AND HEATING?: NOT HARD AT ALL

## 2024-07-29 SDOH — ECONOMIC STABILITY: FOOD INSECURITY: WITHIN THE PAST 12 MONTHS, YOU WORRIED THAT YOUR FOOD WOULD RUN OUT BEFORE YOU GOT MONEY TO BUY MORE.: NEVER TRUE

## 2024-07-29 ASSESSMENT — PATIENT HEALTH QUESTIONNAIRE - PHQ9
SUM OF ALL RESPONSES TO PHQ QUESTIONS 1-9: 0
SUM OF ALL RESPONSES TO PHQ QUESTIONS 1-9: 0
SUM OF ALL RESPONSES TO PHQ9 QUESTIONS 1 & 2: 0
SUM OF ALL RESPONSES TO PHQ QUESTIONS 1-9: 0
1. LITTLE INTEREST OR PLEASURE IN DOING THINGS: NOT AT ALL
2. FEELING DOWN, DEPRESSED OR HOPELESS: NOT AT ALL
SUM OF ALL RESPONSES TO PHQ QUESTIONS 1-9: 0

## 2024-07-29 NOTE — PROGRESS NOTES
[facility]  Patient reports that he needs a new pain management referral. Previously was taking lyrica 100 mg TID and norco 5/325 mg TID. Referral given for Dr. Nelson.     Arthritis of both knees  -     Aryan Rivers MD, Orthopedic Surgery (Hip; Knee; Shoulder), [facility]  Advised to f/u with his ortho    Recommendations for Preventive Services Due: see orders and patient instructions/AVS.  Recommended screening schedule for the next 5-10 years is provided to the patient in written form: see Patient Instructions/AVS.     Return in about 2 weeks (around 8/12/2024), or if symptoms worsen or fail to improve, for blood pressure.     Subjective   The following acute and/or chronic problems were also addressed today:      Hypertension:  Home blood pressure monitoring: yes- 123-130s/70-80.  He is not adherent to a low sodium diet. Patient denies chest pain, shortness of breath, headache, peripheral edema and palpitations.  Antihypertensive medication side effects: no medication side effects noted.   On amlodipine 10 mg daily    Hyperlipidemia:  No new myalgias or GI upset on atorvastatin (Lipitor). Medication compliance: compliant most of the time. Patient is not following a low fat, low cholesterol diet.  He is not exercising regularly.      No results found for: \"LABA1C\"  Lab Results   Component Value Date    CREATININE 1.1 01/22/2024     Lab Results   Component Value Date    ALT 36 01/22/2024    AST 33 01/22/2024     Lab Results   Component Value Date    CHOL 182 01/22/2024    TRIG 88 01/22/2024    HDL 50 01/22/2024        Obesity- no routine diet or exercise. Has been decreasing bread intake.      Did follow with pain management [facility] for chronic pain in right foot and low back pain. Last seen 2 months ago. Was recommended to have pain stimulator but he was not interested in surgery. Was recommended to find a different pain specialist.   Was taking lyrica TID and norco TID

## 2024-07-30 DIAGNOSIS — E87.6 HYPOKALEMIA: Primary | ICD-10-CM

## 2024-10-28 ENCOUNTER — OFFICE VISIT (OUTPATIENT)
Dept: ORTHOPEDIC SURGERY | Age: 59
End: 2024-10-28

## 2024-10-28 VITALS — WEIGHT: 240 LBS | BODY MASS INDEX: 35.55 KG/M2 | HEIGHT: 69 IN

## 2024-10-28 DIAGNOSIS — M17.0 PRIMARY OSTEOARTHRITIS OF BOTH KNEES: Primary | ICD-10-CM

## 2024-10-28 RX ORDER — BUPIVACAINE HYDROCHLORIDE 2.5 MG/ML
2 INJECTION, SOLUTION INFILTRATION; PERINEURAL ONCE
Status: COMPLETED | OUTPATIENT
Start: 2024-10-28 | End: 2024-10-28

## 2024-10-28 RX ORDER — MELOXICAM 15 MG/1
15 TABLET ORAL DAILY PRN
Qty: 30 TABLET | Refills: 0 | Status: SHIPPED | OUTPATIENT
Start: 2024-10-28

## 2024-10-28 RX ORDER — TRIAMCINOLONE ACETONIDE 40 MG/ML
40 INJECTION, SUSPENSION INTRA-ARTICULAR; INTRAMUSCULAR ONCE
Status: COMPLETED | OUTPATIENT
Start: 2024-10-28 | End: 2024-10-28

## 2024-10-28 RX ADMIN — TRIAMCINOLONE ACETONIDE 40 MG: 40 INJECTION, SUSPENSION INTRA-ARTICULAR; INTRAMUSCULAR at 14:19

## 2024-10-28 RX ADMIN — BUPIVACAINE HYDROCHLORIDE 5 MG: 2.5 INJECTION, SOLUTION INFILTRATION; PERINEURAL at 14:18

## 2024-10-28 RX ADMIN — BUPIVACAINE HYDROCHLORIDE 5 MG: 2.5 INJECTION, SOLUTION INFILTRATION; PERINEURAL at 14:19

## 2024-10-28 NOTE — PROGRESS NOTES
[facility]  Office Visit    Chief Complaint: Bilateral knee pain    HPI:  Karl Victor is a 59 y.o. who is here in follow-up of bilateral knee pain.  He has been seen a few times in the past year for steroid injections and viscosupplementation injections.  However, he has not been seen in 2.5 years.  He has continued to have symptoms.  Symptoms are typically worse in the left knee. He feels the pain both medially and anterior in both knees.  Walking for prolonged times or getting up from a seated position because pain. There is no history of injury or surgery.  He is walking with the use of 1 crutch.      Patient Active Problem List   Diagnosis    BWC Fracture of phalanx of right foot, open    BWC Open fracture of phalanx of lesser toe of right foot    BWC Sprain of right foot    BWC Crushing injury of right foot    BWC Contusion of right foot    Complex regional pain syndrome type 1 of right lower extremity    Essential hypertension    Obesity, Class II, BMI 35-39.9    Hypercholesterolemia    Sebaceous cyst    Elevated liver enzymes    Fatty liver- u/s 10/2021    Primary osteoarthritis of both knees       ROS:  Constitutional: denies fever, chills, weight loss  MSK: denies pain in other joints, muscle aches  Neurological: denies numbness, tingling, weakness    Exam:  Height 1.753 m (5' 9\"), weight 108.9 kg (240 lb).    Appearance: sitting in exam room chair, appears to be in no acute distress, awake and alert  Resp: unlabored breathing on room air  Skin: warm, dry and intact with out erythema or significant increased temperature  Neuro: grossly intact both lower extremities. Intact sensation to light touch. Motor exam 4+ to 5/5 in all major motor groups.  Bilateral knees: Examination reveals that knee range of motion is 0 to 130 degrees.  There is varus deformity, positive crepitus, positive joint line tenderness, positive antalgic gait.  Neurologically, plantar flexion and

## 2024-10-30 DIAGNOSIS — I10 ESSENTIAL HYPERTENSION: ICD-10-CM

## 2024-10-30 RX ORDER — AMLODIPINE BESYLATE 10 MG/1
10 TABLET ORAL DAILY
Qty: 90 TABLET | Refills: 0 | Status: SHIPPED | OUTPATIENT
Start: 2024-10-30 | End: 2024-12-16

## 2024-11-29 RX ORDER — MELOXICAM 15 MG/1
15 TABLET ORAL DAILY PRN
Qty: 30 TABLET | Refills: 0 | Status: SHIPPED | OUTPATIENT
Start: 2024-11-29

## 2024-12-15 DIAGNOSIS — I10 ESSENTIAL HYPERTENSION: ICD-10-CM

## 2024-12-16 RX ORDER — AMLODIPINE BESYLATE 10 MG/1
10 TABLET ORAL DAILY
Qty: 90 TABLET | Refills: 0 | Status: SHIPPED | OUTPATIENT
Start: 2024-12-16

## 2024-12-23 LAB — NONINV COLON CA DNA+OCC BLD SCRN STL QL: NEGATIVE

## 2025-01-29 ENCOUNTER — OFFICE VISIT (OUTPATIENT)
Dept: FAMILY MEDICINE CLINIC | Age: 60
End: 2025-01-29
Payer: MEDICARE

## 2025-01-29 VITALS
TEMPERATURE: 97.8 F | HEART RATE: 52 BPM | HEIGHT: 69 IN | SYSTOLIC BLOOD PRESSURE: 122 MMHG | OXYGEN SATURATION: 95 % | WEIGHT: 221 LBS | DIASTOLIC BLOOD PRESSURE: 62 MMHG | BODY MASS INDEX: 32.73 KG/M2 | RESPIRATION RATE: 20 BRPM

## 2025-01-29 DIAGNOSIS — E78.00 HYPERCHOLESTEROLEMIA: ICD-10-CM

## 2025-01-29 DIAGNOSIS — I10 ESSENTIAL HYPERTENSION: ICD-10-CM

## 2025-01-29 DIAGNOSIS — E87.6 HYPOKALEMIA: ICD-10-CM

## 2025-01-29 DIAGNOSIS — I10 ESSENTIAL HYPERTENSION: Primary | ICD-10-CM

## 2025-01-29 DIAGNOSIS — M79.671 CHRONIC PAIN IN RIGHT FOOT: ICD-10-CM

## 2025-01-29 DIAGNOSIS — G89.29 CHRONIC PAIN IN RIGHT FOOT: ICD-10-CM

## 2025-01-29 DIAGNOSIS — K76.0 FATTY LIVER: ICD-10-CM

## 2025-01-29 LAB
ALBUMIN SERPL-MCNC: 4.2 G/DL (ref 3.4–5)
ALBUMIN/GLOB SERPL: 1.4 {RATIO} (ref 1.1–2.2)
ALP SERPL-CCNC: 95 U/L (ref 40–129)
ALT SERPL-CCNC: 32 U/L (ref 10–40)
ANION GAP SERPL CALCULATED.3IONS-SCNC: 12 MMOL/L (ref 3–16)
AST SERPL-CCNC: 24 U/L (ref 15–37)
BILIRUB SERPL-MCNC: 0.3 MG/DL (ref 0–1)
BUN SERPL-MCNC: 15 MG/DL (ref 7–20)
CALCIUM SERPL-MCNC: 9.7 MG/DL (ref 8.3–10.6)
CHLORIDE SERPL-SCNC: 106 MMOL/L (ref 99–110)
CHOLEST SERPL-MCNC: 190 MG/DL (ref 0–199)
CO2 SERPL-SCNC: 26 MMOL/L (ref 21–32)
CREAT SERPL-MCNC: 1.1 MG/DL (ref 0.8–1.3)
GFR SERPLBLD CREATININE-BSD FMLA CKD-EPI: 76 ML/MIN/{1.73_M2}
GLUCOSE SERPL-MCNC: 92 MG/DL (ref 70–99)
HDLC SERPL-MCNC: 50 MG/DL (ref 40–60)
LDL CHOLESTEROL: 129 MG/DL
POTASSIUM SERPL-SCNC: 4 MMOL/L (ref 3.5–5.1)
PROT SERPL-MCNC: 7.2 G/DL (ref 6.4–8.2)
SODIUM SERPL-SCNC: 144 MMOL/L (ref 136–145)
TRIGL SERPL-MCNC: 56 MG/DL (ref 0–150)
VLDLC SERPL CALC-MCNC: 11 MG/DL

## 2025-01-29 PROCEDURE — 1036F TOBACCO NON-USER: CPT | Performed by: NURSE PRACTITIONER

## 2025-01-29 PROCEDURE — 3074F SYST BP LT 130 MM HG: CPT | Performed by: NURSE PRACTITIONER

## 2025-01-29 PROCEDURE — G2211 COMPLEX E/M VISIT ADD ON: HCPCS | Performed by: NURSE PRACTITIONER

## 2025-01-29 PROCEDURE — 3078F DIAST BP <80 MM HG: CPT | Performed by: NURSE PRACTITIONER

## 2025-01-29 PROCEDURE — 99214 OFFICE O/P EST MOD 30 MIN: CPT | Performed by: NURSE PRACTITIONER

## 2025-01-29 PROCEDURE — G8427 DOCREV CUR MEDS BY ELIG CLIN: HCPCS | Performed by: NURSE PRACTITIONER

## 2025-01-29 PROCEDURE — G8417 CALC BMI ABV UP PARAM F/U: HCPCS | Performed by: NURSE PRACTITIONER

## 2025-01-29 PROCEDURE — 3017F COLORECTAL CA SCREEN DOC REV: CPT | Performed by: NURSE PRACTITIONER

## 2025-01-29 RX ORDER — AMLODIPINE BESYLATE 10 MG/1
10 TABLET ORAL DAILY
Qty: 90 TABLET | Refills: 1 | Status: SHIPPED | OUTPATIENT
Start: 2025-01-29

## 2025-01-29 RX ORDER — ATORVASTATIN CALCIUM 40 MG/1
40 TABLET, FILM COATED ORAL DAILY
Qty: 90 TABLET | Refills: 1 | Status: SHIPPED | OUTPATIENT
Start: 2025-01-29

## 2025-01-29 SDOH — ECONOMIC STABILITY: FOOD INSECURITY: WITHIN THE PAST 12 MONTHS, THE FOOD YOU BOUGHT JUST DIDN'T LAST AND YOU DIDN'T HAVE MONEY TO GET MORE.: NEVER TRUE

## 2025-01-29 SDOH — ECONOMIC STABILITY: FOOD INSECURITY: WITHIN THE PAST 12 MONTHS, YOU WORRIED THAT YOUR FOOD WOULD RUN OUT BEFORE YOU GOT MONEY TO BUY MORE.: NEVER TRUE

## 2025-01-29 ASSESSMENT — PATIENT HEALTH QUESTIONNAIRE - PHQ9
SUM OF ALL RESPONSES TO PHQ QUESTIONS 1-9: 0
2. FEELING DOWN, DEPRESSED OR HOPELESS: NOT AT ALL
SUM OF ALL RESPONSES TO PHQ QUESTIONS 1-9: 0
SUM OF ALL RESPONSES TO PHQ QUESTIONS 1-9: 0
SUM OF ALL RESPONSES TO PHQ9 QUESTIONS 1 & 2: 0
1. LITTLE INTEREST OR PLEASURE IN DOING THINGS: NOT AT ALL
SUM OF ALL RESPONSES TO PHQ QUESTIONS 1-9: 0

## 2025-01-29 NOTE — PROGRESS NOTES
1/29/2025    This is a 59 y.o. male   Chief Complaint   Patient presents with    Hypertension     Bp has been up and down    Foot Pain     Still has chronic rt foot pain.    .    HPI  Patient is here with his friend Sarah.     Hypertension:  Home blood pressure monitoring: no.  He is not adherent to a low sodium diet. Patient denies chest pain, shortness of breath, headache, peripheral edema and palpitations.  Antihypertensive medication side effects: no medication side effects noted.   On amlodipine 10 mg daily                                      Sodium (mmol/L)   Date Value   07/29/2024 143    BUN (mg/dL)   Date Value   07/29/2024 12    Glucose (mg/dL)   Date Value   07/29/2024 104 (H)      Potassium (mmol/L)   Date Value   07/29/2024 3.1 (L)    Creatinine (mg/dL)   Date Value   07/29/2024 1.2         Hyperlipidemia:  No new myalgias or GI upset on atorvastatin (Lipitor). Medication compliance: compliant most of the time. Patient is not following a low fat, low cholesterol diet.  He is not exercising regularly.     Lab Results   Component Value Date    CHOL 182 01/22/2024    TRIG 88 01/22/2024    HDL 50 01/22/2024     Lab Results   Component Value Date    ALT 39 07/29/2024    AST 31 07/29/2024        Obesity- no routine diet or exercise. Has been decreasing bread intake.     Did follow with pain management [facility] for chronic pain in right foot and low back pain. Last seen months ago. Was recommended to have pain stimulator but he was not interested in surgery. Was recommended to find a different pain specialist.   Was taking lyrica TID and norco TID from pain specialist.  Patient reports that he can take brand-name Lyrica but not able to take the generic due to allergy.  Patient reports these pain medications helped decrease his pain slightly.  He reports that he continues with chronic pain in right ankle.   Had epidural injection in low back without improvement in pain. Has not had another

## 2025-03-15 DIAGNOSIS — E78.00 HYPERCHOLESTEROLEMIA: ICD-10-CM

## 2025-03-17 RX ORDER — ATORVASTATIN CALCIUM 40 MG/1
40 TABLET, FILM COATED ORAL DAILY
Qty: 90 TABLET | Refills: 1 | Status: SHIPPED | OUTPATIENT
Start: 2025-03-17

## 2025-06-30 DIAGNOSIS — I10 ESSENTIAL HYPERTENSION: ICD-10-CM

## 2025-06-30 RX ORDER — AMLODIPINE BESYLATE 10 MG/1
10 TABLET ORAL DAILY
Qty: 90 TABLET | Refills: 0 | Status: SHIPPED | OUTPATIENT
Start: 2025-06-30

## (undated) DEVICE — CHLORAPREP 26ML ORANGE

## (undated) DEVICE — SUTURE VCRL SZ 3-0 L27IN ABSRB UD L26MM SH 1/2 CIR J416H

## (undated) DEVICE — GLOVE ORANGE PI 7 1/2   MSG9075

## (undated) DEVICE — GLOVE ORANGE PI 7   MSG9070

## (undated) DEVICE — DRAPE THYROID

## (undated) DEVICE — PENCIL ES L3M BTTN SWCH S STL HEX LOK BLDE ELECTRD HOLSTER

## (undated) DEVICE — NEEDLE HYPO 25GA L1.5IN BVL ORIENTED ECLIPSE

## (undated) DEVICE — CLIP LIG M TI 6 SIL HNDL FOR OPN AND ENDOSCP SGL APPL

## (undated) DEVICE — NEEDLE HYPO 22GA L1 1/2IN PIVOTING SHLD FOR LUERLOCK SYR

## (undated) DEVICE — SYRINGE MED 10ML TRNSLUC BRL PLUNG BLK MRK POLYPR CTRL

## (undated) DEVICE — COVER LT HNDL BLU PLAS

## (undated) DEVICE — SUTURE VCRL SZ 4-0 L18IN ABSRB UD L19MM PS-2 3/8 CIR PRIM J496H

## (undated) DEVICE — CANISTER, RIGID, 1200CC: Brand: MEDLINE INDUSTRIES, INC.

## (undated) DEVICE — GOWN SIRUS NONREIN LG W/TWL: Brand: MEDLINE INDUSTRIES, INC.

## (undated) DEVICE — ELECTRODE PT RET AD L9FT HI MOIST COND ADH HYDRGEL CORDED

## (undated) DEVICE — Z DUP USE 2257490 ADHESIVE SKIN CLSRE 036ML TPCL 2CTL CNCRLTE HIGH VSCSTY DRMB

## (undated) DEVICE — INTENDED FOR TISSUE SEPARATION, AND OTHER PROCEDURES THAT REQUIRE A SHARP SURGICAL BLADE TO PUNCTURE OR CUT.: Brand: BARD-PARKER ® STAINLESS STEEL BLADES

## (undated) DEVICE — SOLUTION IV IRRIG POUR BRL 0.9% SODIUM CHL 2F7124

## (undated) DEVICE — MINOR SET UP PK

## (undated) DEVICE — MERCY HEALTH WEST TURNOVER: Brand: MEDLINE INDUSTRIES, INC.

## (undated) DEVICE — TUBING, SUCTION, 1/4" X 12', STRAIGHT: Brand: MEDLINE